# Patient Record
Sex: FEMALE | Race: WHITE | NOT HISPANIC OR LATINO | Employment: OTHER | URBAN - METROPOLITAN AREA
[De-identification: names, ages, dates, MRNs, and addresses within clinical notes are randomized per-mention and may not be internally consistent; named-entity substitution may affect disease eponyms.]

---

## 2017-01-06 ENCOUNTER — COMMUNICATION - HEALTHEAST (OUTPATIENT)
Dept: FAMILY MEDICINE | Facility: CLINIC | Age: 32
End: 2017-01-06

## 2017-01-06 DIAGNOSIS — F98.8 ATTENTION DEFICIT DISORDER: ICD-10-CM

## 2017-02-01 ENCOUNTER — COMMUNICATION - HEALTHEAST (OUTPATIENT)
Dept: FAMILY MEDICINE | Facility: CLINIC | Age: 32
End: 2017-02-01

## 2017-02-01 DIAGNOSIS — F32.9 REACTIVE DEPRESSION: ICD-10-CM

## 2017-02-01 DIAGNOSIS — F98.8 ATTENTION DEFICIT DISORDER: ICD-10-CM

## 2017-02-17 ENCOUNTER — COMMUNICATION - HEALTHEAST (OUTPATIENT)
Dept: FAMILY MEDICINE | Facility: CLINIC | Age: 32
End: 2017-02-17

## 2017-02-17 DIAGNOSIS — F98.8 ATTENTION DEFICIT DISORDER: ICD-10-CM

## 2017-03-02 ENCOUNTER — RECORDS - HEALTHEAST (OUTPATIENT)
Dept: ADMINISTRATIVE | Facility: OTHER | Age: 32
End: 2017-03-02

## 2017-03-04 ENCOUNTER — COMMUNICATION - HEALTHEAST (OUTPATIENT)
Dept: FAMILY MEDICINE | Facility: CLINIC | Age: 32
End: 2017-03-04

## 2017-04-04 ENCOUNTER — COMMUNICATION - HEALTHEAST (OUTPATIENT)
Dept: FAMILY MEDICINE | Facility: CLINIC | Age: 32
End: 2017-04-04

## 2017-05-06 ENCOUNTER — COMMUNICATION - HEALTHEAST (OUTPATIENT)
Dept: FAMILY MEDICINE | Facility: CLINIC | Age: 32
End: 2017-05-06

## 2017-05-06 DIAGNOSIS — F98.8 ATTENTION DEFICIT DISORDER: ICD-10-CM

## 2017-06-23 ENCOUNTER — COMMUNICATION - HEALTHEAST (OUTPATIENT)
Dept: FAMILY MEDICINE | Facility: CLINIC | Age: 32
End: 2017-06-23

## 2017-06-23 DIAGNOSIS — F98.8 ATTENTION DEFICIT DISORDER: ICD-10-CM

## 2017-11-03 ENCOUNTER — COMMUNICATION - HEALTHEAST (OUTPATIENT)
Dept: FAMILY MEDICINE | Facility: CLINIC | Age: 32
End: 2017-11-03

## 2017-11-03 DIAGNOSIS — F98.8 ATTENTION DEFICIT DISORDER: ICD-10-CM

## 2017-11-27 ENCOUNTER — OFFICE VISIT - HEALTHEAST (OUTPATIENT)
Dept: FAMILY MEDICINE | Facility: CLINIC | Age: 32
End: 2017-11-27

## 2017-11-27 DIAGNOSIS — R87.618 ABNORMAL PAPANICOLAOU SMEAR OF CERVIX WITH POSITIVE HUMAN PAPILLOMA VIRUS (HPV) TEST: ICD-10-CM

## 2017-11-27 DIAGNOSIS — F98.8 ATTENTION DEFICIT DISORDER: ICD-10-CM

## 2017-11-27 DIAGNOSIS — Z00.00 ROUTINE GENERAL MEDICAL EXAMINATION AT A HEALTH CARE FACILITY: ICD-10-CM

## 2017-11-27 DIAGNOSIS — F41.9 ANXIETY: ICD-10-CM

## 2017-11-27 LAB
CHOLEST SERPL-MCNC: 199 MG/DL
FASTING STATUS PATIENT QL REPORTED: YES
HDLC SERPL-MCNC: 58 MG/DL
LDLC SERPL CALC-MCNC: 120 MG/DL
TRIGL SERPL-MCNC: 107 MG/DL

## 2017-11-27 ASSESSMENT — MIFFLIN-ST. JEOR: SCORE: 1282.36

## 2017-11-30 LAB
HPV INTERPRETATION - HISTORICAL: NORMAL
HPV INTERPRETER - HISTORICAL: NORMAL

## 2017-12-01 LAB
BKR LAB AP ABNORMAL BLEEDING: NO
BKR LAB AP BIRTH CONTROL/HORMONES: NORMAL
BKR LAB AP CERVICAL APPEARANCE: NORMAL
BKR LAB AP GYN ADEQUACY: NORMAL
BKR LAB AP GYN INTERPRETATION: NORMAL
BKR LAB AP HPV REFLEX: NORMAL
BKR LAB AP LMP: NORMAL
BKR LAB AP PATIENT STATUS: NORMAL
BKR LAB AP PREVIOUS ABNORMAL: NORMAL
BKR LAB AP PREVIOUS NORMAL: 2014
HIGH RISK?: NO
PATH REPORT.COMMENTS IMP SPEC: NORMAL
RESULT FLAG (HE HISTORICAL CONVERSION): NORMAL

## 2018-01-10 ENCOUNTER — COMMUNICATION - HEALTHEAST (OUTPATIENT)
Dept: FAMILY MEDICINE | Facility: CLINIC | Age: 33
End: 2018-01-10

## 2018-02-08 ENCOUNTER — OFFICE VISIT - HEALTHEAST (OUTPATIENT)
Dept: FAMILY MEDICINE | Facility: CLINIC | Age: 33
End: 2018-02-08

## 2018-02-08 DIAGNOSIS — H69.92 EUSTACHIAN TUBE DYSFUNCTION, LEFT: ICD-10-CM

## 2018-02-08 ASSESSMENT — MIFFLIN-ST. JEOR: SCORE: 1272.83

## 2018-03-13 ENCOUNTER — COMMUNICATION - HEALTHEAST (OUTPATIENT)
Dept: FAMILY MEDICINE | Facility: CLINIC | Age: 33
End: 2018-03-13

## 2018-03-14 ENCOUNTER — COMMUNICATION - HEALTHEAST (OUTPATIENT)
Dept: FAMILY MEDICINE | Facility: CLINIC | Age: 33
End: 2018-03-14

## 2018-03-14 DIAGNOSIS — F98.8 ATTENTION DEFICIT DISORDER: ICD-10-CM

## 2018-03-15 ENCOUNTER — COMMUNICATION - HEALTHEAST (OUTPATIENT)
Dept: FAMILY MEDICINE | Facility: CLINIC | Age: 33
End: 2018-03-15

## 2018-03-20 ENCOUNTER — OFFICE VISIT - HEALTHEAST (OUTPATIENT)
Dept: FAMILY MEDICINE | Facility: CLINIC | Age: 33
End: 2018-03-20

## 2018-03-20 ENCOUNTER — AMBULATORY - HEALTHEAST (OUTPATIENT)
Dept: FAMILY MEDICINE | Facility: CLINIC | Age: 33
End: 2018-03-20

## 2018-03-20 DIAGNOSIS — F41.9 ANXIETY: ICD-10-CM

## 2018-03-20 DIAGNOSIS — Z30.017 NEXPLANON INSERTION: ICD-10-CM

## 2018-03-20 LAB
HCG UR QL: NEGATIVE
SP GR UR STRIP: >=1.03 (ref 1–1.03)

## 2019-03-14 ENCOUNTER — COMMUNICATION - HEALTHEAST (OUTPATIENT)
Dept: SCHEDULING | Facility: CLINIC | Age: 34
End: 2019-03-14

## 2019-03-14 ENCOUNTER — OFFICE VISIT - HEALTHEAST (OUTPATIENT)
Dept: FAMILY MEDICINE | Facility: CLINIC | Age: 34
End: 2019-03-14

## 2019-03-14 DIAGNOSIS — J10.1 INFLUENZA A: ICD-10-CM

## 2019-03-14 DIAGNOSIS — J06.9 VIRAL UPPER RESPIRATORY TRACT INFECTION: ICD-10-CM

## 2019-03-14 LAB
FLUAV AG SPEC QL IA: ABNORMAL
FLUBV AG SPEC QL IA: ABNORMAL

## 2019-05-20 ENCOUNTER — OFFICE VISIT - HEALTHEAST (OUTPATIENT)
Dept: FAMILY MEDICINE | Facility: CLINIC | Age: 34
End: 2019-05-20

## 2019-05-20 DIAGNOSIS — N89.8 VAGINAL DISCHARGE: ICD-10-CM

## 2019-05-20 DIAGNOSIS — F41.9 ANXIETY: ICD-10-CM

## 2019-05-20 DIAGNOSIS — F98.8 ATTENTION DEFICIT DISORDER: ICD-10-CM

## 2019-05-20 DIAGNOSIS — F32.1 MODERATE MAJOR DEPRESSION (H): ICD-10-CM

## 2019-05-20 DIAGNOSIS — R87.618 ABNORMAL PAPANICOLAOU SMEAR OF CERVIX WITH POSITIVE HUMAN PAPILLOMA VIRUS (HPV) TEST: ICD-10-CM

## 2019-05-20 DIAGNOSIS — Z00.00 ROUTINE GENERAL MEDICAL EXAMINATION AT A HEALTH CARE FACILITY: ICD-10-CM

## 2019-05-20 DIAGNOSIS — L98.9 SKIN LESION OF FACE: ICD-10-CM

## 2019-05-20 DIAGNOSIS — Z30.46 ENCOUNTER FOR NEXPLANON REMOVAL: ICD-10-CM

## 2019-05-20 LAB
ALBUMIN SERPL-MCNC: 4.2 G/DL (ref 3.5–5)
ALP SERPL-CCNC: 67 U/L (ref 45–120)
ALT SERPL W P-5'-P-CCNC: <9 U/L (ref 0–45)
ANION GAP SERPL CALCULATED.3IONS-SCNC: 12 MMOL/L (ref 5–18)
AST SERPL W P-5'-P-CCNC: 13 U/L (ref 0–40)
BILIRUB SERPL-MCNC: 0.7 MG/DL (ref 0–1)
BUN SERPL-MCNC: 12 MG/DL (ref 8–22)
CALCIUM SERPL-MCNC: 9.6 MG/DL (ref 8.5–10.5)
CHLORIDE BLD-SCNC: 107 MMOL/L (ref 98–107)
CHOLEST SERPL-MCNC: 175 MG/DL
CLUE CELLS: NORMAL
CO2 SERPL-SCNC: 21 MMOL/L (ref 22–31)
CREAT SERPL-MCNC: 0.75 MG/DL (ref 0.6–1.1)
FASTING STATUS PATIENT QL REPORTED: YES
GFR SERPL CREATININE-BSD FRML MDRD: >60 ML/MIN/1.73M2
GLUCOSE BLD-MCNC: 89 MG/DL (ref 70–125)
HDLC SERPL-MCNC: 51 MG/DL
HGB BLD-MCNC: 14.8 G/DL (ref 12–16)
LDLC SERPL CALC-MCNC: 101 MG/DL
POTASSIUM BLD-SCNC: 4.3 MMOL/L (ref 3.5–5)
PROT SERPL-MCNC: 7.1 G/DL (ref 6–8)
SODIUM SERPL-SCNC: 140 MMOL/L (ref 136–145)
TRICHOMONAS, WET PREP: NORMAL
TRIGL SERPL-MCNC: 116 MG/DL
TSH SERPL DL<=0.005 MIU/L-ACNC: 0.81 UIU/ML (ref 0.3–5)
YEAST, WET PREP: NORMAL

## 2019-05-20 ASSESSMENT — MIFFLIN-ST. JEOR: SCORE: 1303.05

## 2019-05-21 LAB
25(OH)D3 SERPL-MCNC: 20.7 NG/ML (ref 30–80)
25(OH)D3 SERPL-MCNC: 20.7 NG/ML (ref 30–80)
HPV SOURCE: NORMAL
HUMAN PAPILLOMA VIRUS 16 DNA: NEGATIVE
HUMAN PAPILLOMA VIRUS 18 DNA: NEGATIVE
HUMAN PAPILLOMA VIRUS FINAL DIAGNOSIS: NORMAL
HUMAN PAPILLOMA VIRUS OTHER HR: NEGATIVE
SPECIMEN DESCRIPTION: NORMAL

## 2019-08-05 ENCOUNTER — RECORDS - HEALTHEAST (OUTPATIENT)
Dept: ADMINISTRATIVE | Facility: OTHER | Age: 34
End: 2019-08-05

## 2019-08-15 ENCOUNTER — COMMUNICATION - HEALTHEAST (OUTPATIENT)
Dept: FAMILY MEDICINE | Facility: CLINIC | Age: 34
End: 2019-08-15

## 2019-08-15 DIAGNOSIS — F98.8 ATTENTION DEFICIT DISORDER: ICD-10-CM

## 2020-02-28 ENCOUNTER — COMMUNICATION - HEALTHEAST (OUTPATIENT)
Dept: FAMILY MEDICINE | Facility: CLINIC | Age: 35
End: 2020-02-28

## 2020-02-28 DIAGNOSIS — F98.8 ATTENTION DEFICIT DISORDER: ICD-10-CM

## 2020-03-02 RX ORDER — DEXTROAMPHETAMINE SACCHARATE, AMPHETAMINE ASPARTATE, DEXTROAMPHETAMINE SULFATE AND AMPHETAMINE SULFATE 2.5; 2.5; 2.5; 2.5 MG/1; MG/1; MG/1; MG/1
TABLET ORAL
Qty: 30 TABLET | Refills: 0 | Status: SHIPPED | OUTPATIENT
Start: 2020-03-02 | End: 2022-11-09

## 2020-06-24 ENCOUNTER — COMMUNICATION - HEALTHEAST (OUTPATIENT)
Dept: FAMILY MEDICINE | Facility: CLINIC | Age: 35
End: 2020-06-24

## 2020-06-24 DIAGNOSIS — F98.8 ATTENTION DEFICIT DISORDER: ICD-10-CM

## 2020-07-15 RX ORDER — DEXTROAMPHETAMINE SACCHARATE, AMPHETAMINE ASPARTATE MONOHYDRATE, DEXTROAMPHETAMINE SULFATE AND AMPHETAMINE SULFATE 2.5; 2.5; 2.5; 2.5 MG/1; MG/1; MG/1; MG/1
CAPSULE, EXTENDED RELEASE ORAL
Qty: 30 CAPSULE | Refills: 0 | Status: SHIPPED | OUTPATIENT
Start: 2020-07-15 | End: 2022-11-09

## 2021-02-02 ENCOUNTER — OFFICE VISIT - HEALTHEAST (OUTPATIENT)
Dept: FAMILY MEDICINE | Facility: CLINIC | Age: 36
End: 2021-02-02

## 2021-02-02 ENCOUNTER — COMMUNICATION - HEALTHEAST (OUTPATIENT)
Dept: FAMILY MEDICINE | Facility: CLINIC | Age: 36
End: 2021-02-02

## 2021-02-02 DIAGNOSIS — F41.9 ANXIETY: ICD-10-CM

## 2021-02-02 DIAGNOSIS — F32.1 MODERATE MAJOR DEPRESSION (H): ICD-10-CM

## 2021-02-02 ASSESSMENT — PATIENT HEALTH QUESTIONNAIRE - PHQ9: SUM OF ALL RESPONSES TO PHQ QUESTIONS 1-9: 13

## 2021-02-12 ENCOUNTER — AMBULATORY - HEALTHEAST (OUTPATIENT)
Dept: FAMILY MEDICINE | Facility: CLINIC | Age: 36
End: 2021-02-12

## 2021-02-12 DIAGNOSIS — L74.510 HYPERHIDROSIS OF AXILLA: ICD-10-CM

## 2021-02-23 ENCOUNTER — OFFICE VISIT - HEALTHEAST (OUTPATIENT)
Dept: FAMILY MEDICINE | Facility: CLINIC | Age: 36
End: 2021-02-23

## 2021-02-23 DIAGNOSIS — F41.9 ANXIETY: ICD-10-CM

## 2021-03-12 ENCOUNTER — COMMUNICATION - HEALTHEAST (OUTPATIENT)
Dept: FAMILY MEDICINE | Facility: CLINIC | Age: 36
End: 2021-03-12

## 2021-03-12 DIAGNOSIS — Z72.51 UNPROTECTED SEXUAL INTERCOURSE: ICD-10-CM

## 2021-03-30 ENCOUNTER — OFFICE VISIT - HEALTHEAST (OUTPATIENT)
Dept: FAMILY MEDICINE | Facility: CLINIC | Age: 36
End: 2021-03-30

## 2021-03-30 DIAGNOSIS — Z11.59 ENCOUNTER FOR HCV SCREENING TEST FOR LOW RISK PATIENT: ICD-10-CM

## 2021-03-30 DIAGNOSIS — Z00.00 ROUTINE GENERAL MEDICAL EXAMINATION AT A HEALTH CARE FACILITY: ICD-10-CM

## 2021-03-30 DIAGNOSIS — Z30.09 ENCOUNTER FOR OTHER GENERAL COUNSELING OR ADVICE ON CONTRACEPTION: ICD-10-CM

## 2021-03-30 LAB
ALBUMIN SERPL-MCNC: 4.3 G/DL (ref 3.5–5)
ALP SERPL-CCNC: 73 U/L (ref 45–120)
ALT SERPL W P-5'-P-CCNC: 11 U/L (ref 0–45)
ANION GAP SERPL CALCULATED.3IONS-SCNC: 11 MMOL/L (ref 5–18)
AST SERPL W P-5'-P-CCNC: 14 U/L (ref 0–40)
BASOPHILS # BLD AUTO: 0.1 THOU/UL (ref 0–0.2)
BASOPHILS NFR BLD AUTO: 1 % (ref 0–2)
BILIRUB SERPL-MCNC: 0.4 MG/DL (ref 0–1)
BUN SERPL-MCNC: 12 MG/DL (ref 8–22)
CALCIUM SERPL-MCNC: 9.5 MG/DL (ref 8.5–10.5)
CHLORIDE BLD-SCNC: 105 MMOL/L (ref 98–107)
CO2 SERPL-SCNC: 25 MMOL/L (ref 22–31)
CREAT SERPL-MCNC: 0.86 MG/DL (ref 0.6–1.1)
EOSINOPHIL # BLD AUTO: 0.1 THOU/UL (ref 0–0.4)
EOSINOPHIL NFR BLD AUTO: 2 % (ref 0–6)
ERYTHROCYTE [DISTWIDTH] IN BLOOD BY AUTOMATED COUNT: 12.9 % (ref 11–14.5)
GFR SERPL CREATININE-BSD FRML MDRD: >60 ML/MIN/1.73M2
GLUCOSE BLD-MCNC: 92 MG/DL (ref 70–125)
HCT VFR BLD AUTO: 44.8 % (ref 35–47)
HGB BLD-MCNC: 14.5 G/DL (ref 12–16)
IMM GRANULOCYTES # BLD: 0 THOU/UL
IMM GRANULOCYTES NFR BLD: 0 %
LYMPHOCYTES # BLD AUTO: 2.6 THOU/UL (ref 0.8–4.4)
LYMPHOCYTES NFR BLD AUTO: 34 % (ref 20–40)
MCH RBC QN AUTO: 29.5 PG (ref 27–34)
MCHC RBC AUTO-ENTMCNC: 32.4 G/DL (ref 32–36)
MCV RBC AUTO: 91 FL (ref 80–100)
MONOCYTES # BLD AUTO: 0.7 THOU/UL (ref 0–0.9)
MONOCYTES NFR BLD AUTO: 9 % (ref 2–10)
NEUTROPHILS # BLD AUTO: 4.3 THOU/UL (ref 2–7.7)
NEUTROPHILS NFR BLD AUTO: 55 % (ref 50–70)
PLATELET # BLD AUTO: 492 THOU/UL (ref 140–440)
PMV BLD AUTO: 9.2 FL (ref 7–10)
POTASSIUM BLD-SCNC: 4.4 MMOL/L (ref 3.5–5)
PROT SERPL-MCNC: 7.4 G/DL (ref 6–8)
RBC # BLD AUTO: 4.91 MILL/UL (ref 3.8–5.4)
SODIUM SERPL-SCNC: 141 MMOL/L (ref 136–145)
TSH SERPL DL<=0.005 MIU/L-ACNC: 1.37 UIU/ML (ref 0.3–5)
WBC: 7.7 THOU/UL (ref 4–11)

## 2021-03-30 ASSESSMENT — MIFFLIN-ST. JEOR: SCORE: 1298.24

## 2021-03-31 LAB
25(OH)D3 SERPL-MCNC: 17.3 NG/ML (ref 30–80)
25(OH)D3 SERPL-MCNC: 17.3 NG/ML (ref 30–80)
HCV AB SERPL QL IA: NEGATIVE

## 2021-04-02 ENCOUNTER — AMBULATORY - HEALTHEAST (OUTPATIENT)
Dept: FAMILY MEDICINE | Facility: CLINIC | Age: 36
End: 2021-04-02

## 2021-04-02 DIAGNOSIS — E55.9 VITAMIN D DEFICIENCY: ICD-10-CM

## 2021-04-02 RX ORDER — ERGOCALCIFEROL 1.25 MG/1
50000 CAPSULE ORAL WEEKLY
Qty: 12 CAPSULE | Refills: 1 | Status: SHIPPED | OUTPATIENT
Start: 2021-04-02 | End: 2021-09-06

## 2021-04-08 ENCOUNTER — OFFICE VISIT - HEALTHEAST (OUTPATIENT)
Dept: FAMILY MEDICINE | Facility: CLINIC | Age: 36
End: 2021-04-08

## 2021-04-08 DIAGNOSIS — N92.6 ABNORMAL MENSES: ICD-10-CM

## 2021-04-08 DIAGNOSIS — Z30.017 NEXPLANON INSERTION: ICD-10-CM

## 2021-04-08 LAB — HCG UR QL: NEGATIVE

## 2021-04-16 ENCOUNTER — COMMUNICATION - HEALTHEAST (OUTPATIENT)
Dept: FAMILY MEDICINE | Facility: CLINIC | Age: 36
End: 2021-04-16

## 2021-04-30 ENCOUNTER — COMMUNICATION - HEALTHEAST (OUTPATIENT)
Dept: FAMILY MEDICINE | Facility: CLINIC | Age: 36
End: 2021-04-30

## 2021-04-30 DIAGNOSIS — F41.9 ANXIETY: ICD-10-CM

## 2021-05-25 ENCOUNTER — RECORDS - HEALTHEAST (OUTPATIENT)
Dept: ADMINISTRATIVE | Facility: CLINIC | Age: 36
End: 2021-05-25

## 2021-05-26 ENCOUNTER — COMMUNICATION - HEALTHEAST (OUTPATIENT)
Dept: FAMILY MEDICINE | Facility: CLINIC | Age: 36
End: 2021-05-26

## 2021-05-26 DIAGNOSIS — F41.9 ANXIETY: ICD-10-CM

## 2021-05-26 RX ORDER — BUPROPION HYDROCHLORIDE 300 MG/1
300 TABLET ORAL DAILY
Qty: 30 TABLET | Refills: 0 | Status: SHIPPED | OUTPATIENT
Start: 2021-05-26 | End: 2021-08-04

## 2021-05-27 ENCOUNTER — RECORDS - HEALTHEAST (OUTPATIENT)
Dept: ADMINISTRATIVE | Facility: CLINIC | Age: 36
End: 2021-05-27

## 2021-05-27 ASSESSMENT — PATIENT HEALTH QUESTIONNAIRE - PHQ9: SUM OF ALL RESPONSES TO PHQ QUESTIONS 1-9: 13

## 2021-05-29 ENCOUNTER — RECORDS - HEALTHEAST (OUTPATIENT)
Dept: ADMINISTRATIVE | Facility: CLINIC | Age: 36
End: 2021-05-29

## 2021-05-29 NOTE — PROGRESS NOTES
Assessment:      Healthy female exam.    1. Routine general medical examination at a health care facility  Comprehensive Metabolic Panel    Lipid Profile    Thyroid Stimulating Hormone (TSH)    Hemoglobin    Vitamin D, Total (25-Hydroxy)   2. ADHD, Predominantly Inattentive Type  dextroamphetamine-amphetamine (ADDERALL XR) 10 MG 24 hr capsule    dextroamphetamine-amphetamine (ADDERALL) 10 mg Tab tablet   3. Anxiety  buPROPion (WELLBUTRIN XL) 150 MG 24 hr tablet   4. Skin lesion of face  Ambulatory referral to Dermatology   5. Encounter for Nexplanon removal  lidocaine 1%-EPINEPHrine 1:100,000 1 %-1:100,000 injection 2.5 mL (XYLOCAINE W/EPI)   6. Abnormal Papanicolaou smear of cervix with positive human papilloma virus (HPV) test  Gynecologic Cytology (PAP Smear)    HPV High Risk DNA Cervical   7. Vaginal discharge  Wet Prep, Vaginal   8. Moderate major depression (H)            Plan:      This is a 34 yo female here for physical exam:  1.  Has h/o abnormal pap smear - will do cervical cancer screening today - pap/HPV sent today.  2.  Encounter for Nexplanon removal - patient's Nexplanon has  - she wishes removal of device today; does not want further contraception at this time  3.  ADHD - discussed at length - taking stimulant therapy - tolerating well  4.  Anxiety/Depression  - PHQ-9 Total Score: 13 (2019  2:00 PM)  still having some symptoms - taking Bupropion - tolerating okay  5.  Skin lesion - on face - non-healing and crusted - will recommend referral to dermatology  6. Vaginal discharge - check wet prep      Subjective:      Alia Wilson is a 33 y.o. female who presents for an annual exam. The patient reports that there is not domestic violence in her life.     Anxious, feel like stimulant therapy is helpful in general  Busy, running after kids      Healthy Habits:   Regular Exercise: No  Sunscreen Use: No  Healthy Diet: trying  Dental Visits Regularly: Yes  Seat Belt: Yes  Sexually active:  Yes  Self Breast Exam Monthly:No      Immunization History   Administered Date(s) Administered     DT (pediatric) 2005     HPV Quadrivalent 2008, 2008, 2008     Hep B, historic 1996     Influenza, Seasonal, Inj PF IIV3 10/21/2009     Influenza, inj, historic,unspecified 10/01/2014     Influenza,seasonal, Inj IIV3 2006     MMR 1996     Tdap 2008, 2015     Varicella Zoster Immune Globulin 10/07/2014     Immunization status: reviewed.    No exam data present    Gynecologic History  No LMP recorded. Patient has had an implant.  Contraception: Nexplanon - removed today  Last Pap: 17 Results were: normal, but has h/o abnormals  Last mammogram: NA. Results were: NA      OB History    Para Term  AB Living   3 3 2 1 0 3   SAB TAB Ectopic Multiple Live Births   0 0 0 0 1      # Outcome Date GA Lbr Jer/2nd Weight Sex Delivery Anes PTL Lv   3  04/16/15 32w2d  3 lb 10 oz (1.644 kg) M CS-LTranv EPI Y EVELIO      Birth Comments: Premature birth due to complete placenta previa - bleeding      Complications: Placenta Previa   2 Term            1 Term                Current Outpatient Medications   Medication Sig Dispense Refill     buPROPion (WELLBUTRIN XL) 150 MG 24 hr tablet Take 1 tablet (150 mg total) by mouth daily. 30 tablet 5     dextroamphetamine-amphetamine (ADDERALL XR) 10 MG 24 hr capsule Take 1 capsule (10 mg total) by mouth daily. 30 capsule 0     dextroamphetamine-amphetamine (ADDERALL) 10 mg Tab tablet Take 1 tablet by mouth daily. 30 tablet 0     Current Facility-Administered Medications   Medication Dose Route Frequency Provider Last Rate Last Dose     etonogestrel 68 mg implant 1 each (NEXPLANON)  1 each Subdermal Once April Alejandre PA-C         Past Medical History:   Diagnosis Date     Placenta previa 2015     Pregnancy 2015     Past Surgical History:   Procedure Laterality Date     CERVICAL BIOPSY  W/ LOOP ELECTRODE  EXCISION       GA CONIZATION CERVIX,KNIFE/LASER      Description: Cervical Conization;  Recorded: 07/30/2008;     GA CONIZATION CERVIX,KNIFE/LASER      Description: Cervical Conization;  Proc Date: 01/01/2008;     Percocet [oxycodone-acetaminophen] and Dilaudid [hydromorphone]  Family History   Problem Relation Age of Onset     COPD Mother         early     Heart disease Mother         atypical chest pain     Hypertension Mother      Ovarian cancer Maternal Grandmother      Breast cancer Paternal Grandmother      Cervical cancer Paternal Grandmother      Social History     Socioeconomic History     Marital status: Single     Spouse name: Not on file     Number of children: Not on file     Years of education: Not on file     Highest education level: Not on file   Occupational History     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   Social Needs     Financial resource strain: Not on file     Food insecurity:     Worry: Not on file     Inability: Not on file     Transportation needs:     Medical: Not on file     Non-medical: Not on file   Tobacco Use     Smoking status: Former Smoker     Smokeless tobacco: Never Used     Tobacco comment: no smoke exposure    Substance and Sexual Activity     Alcohol use: No     Drug use: No     Sexual activity: Yes     Partners: Male   Lifestyle     Physical activity:     Days per week: Not on file     Minutes per session: Not on file     Stress: Not on file   Relationships     Social connections:     Talks on phone: Not on file     Gets together: Not on file     Attends Restoration service: Not on file     Active member of club or organization: Not on file     Attends meetings of clubs or organizations: Not on file     Relationship status: Not on file     Intimate partner violence:     Fear of current or ex partner: Not on file     Emotionally abused: Not on file     Physically abused: Not on file     Forced sexual activity: Not on file   Other Topics Concern     Not on file  "  Social History Narrative    Lives with , 3 kids    Works for [employer]       Review of Systems  Review of Systems      Pertinent positives as noted in HPI; otherwise 12 point ROS negative.      Objective:         Vitals:    05/20/19 1031   BP: 92/70   Pulse: 88   Resp: 20   Temp: 98.2  F (36.8  C)   TempSrc: Oral   Weight: 146 lb 9 oz (66.5 kg)   Height: 5' 2\" (1.575 m)     Body mass index is 26.81 kg/m .    Physical  Physical Exam    EXAM:  BP 92/70 (Patient Site: Right Arm, Patient Position: Sitting, Cuff Size: Adult Regular)   Pulse 88   Temp 98.2  F (36.8  C) (Oral)   Resp 20   Ht 5' 2\" (1.575 m)   Wt 146 lb 9 oz (66.5 kg)   Breastfeeding? No   BMI 26.81 kg/m     Gen:  NAD, appears well, well-hydrated  HEENT:  TMs nl, oropharynx benign, nasal mucosa nl, conjunctiva clear  Neck:  Supple, no adenopathy, no thyromegaly, no carotid bruits, no JVD  Lungs:  Clear to auscultation bilaterally  Breast exam:  No breast lumps, no skin changes, no nipple discharge, no axillary adenopathy  Cor:  RRR no murmur  Abd:  Soft, nontender, BS+, no masses, no guarding or rebound, no HSM  PELVIC EXAM:External genitalia: normal  Vaginal mucosa normal  Vaginal discharge: white  Speculum exam shows a normal appearing cervix .   Bimanual exam: Cervix closed, firm, non tender  to motion.  Uterus  firm, regular, mobile, non tender to palpation. No adnexal masses or tenderness.   Extr:  Neg.  Neuro:  No asymmetry, Nl motor tone/strength, nl sensation, reflexes =, gait nl, nl coordination, CN intact,   Skin:  Warm/dry, small sl hyperpigmented lesion left face with scaling        "

## 2021-05-30 ENCOUNTER — RECORDS - HEALTHEAST (OUTPATIENT)
Dept: ADMINISTRATIVE | Facility: CLINIC | Age: 36
End: 2021-05-30

## 2021-05-31 ENCOUNTER — RECORDS - HEALTHEAST (OUTPATIENT)
Dept: ADMINISTRATIVE | Facility: CLINIC | Age: 36
End: 2021-05-31

## 2021-05-31 VITALS — BODY MASS INDEX: 25.75 KG/M2 | WEIGHT: 139.9 LBS | HEIGHT: 62 IN

## 2021-05-31 VITALS — BODY MASS INDEX: 26.13 KG/M2 | WEIGHT: 142 LBS | HEIGHT: 62 IN

## 2021-05-31 NOTE — TELEPHONE ENCOUNTER
Controlled Substance Refill Request  Medication:   Requested Prescriptions     Pending Prescriptions Disp Refills     dextroamphetamine-amphetamine (ADDERALL) 10 mg Tab tablet [Pharmacy Med Name: Amphetamine-Dextroamphetamine Oral Tablet 10 MG] 30 tablet 0     Sig: TAKE ONE TABLET BY MOUTH ONE TIME DAILY     Date Last Fill: 5/20/19  [facility]   Submit electronically to pharmacy  Controlled Substance Agreement on File:   Encounter-Level CSA Scan Date:    There are no encounter-level csa scan date.       Last office visit: Last office visit pertaining to requested medication was 5/20/19.

## 2021-06-01 VITALS — BODY MASS INDEX: 25.24 KG/M2 | WEIGHT: 138 LBS

## 2021-06-02 VITALS — BODY MASS INDEX: 26.44 KG/M2 | WEIGHT: 144.56 LBS

## 2021-06-03 VITALS — WEIGHT: 146.56 LBS | HEIGHT: 62 IN | BODY MASS INDEX: 26.97 KG/M2

## 2021-06-05 VITALS
SYSTOLIC BLOOD PRESSURE: 104 MMHG | DIASTOLIC BLOOD PRESSURE: 71 MMHG | BODY MASS INDEX: 25.16 KG/M2 | RESPIRATION RATE: 18 BRPM | WEIGHT: 142 LBS | TEMPERATURE: 97.9 F | HEART RATE: 82 BPM | HEIGHT: 63 IN

## 2021-06-05 VITALS
WEIGHT: 140 LBS | BODY MASS INDEX: 24.8 KG/M2 | DIASTOLIC BLOOD PRESSURE: 67 MMHG | SYSTOLIC BLOOD PRESSURE: 98 MMHG | HEART RATE: 69 BPM

## 2021-06-06 NOTE — TELEPHONE ENCOUNTER
Controlled Substance Refill Request  Medication Name:   Requested Prescriptions     Pending Prescriptions Disp Refills     dextroamphetamine-amphetamine (ADDERALL) 10 mg Tab tablet [Pharmacy Med Name: Amphetamine-Dextroamphetamine Oral Tablet 10 MG] 30 tablet 0     Sig: TAKE ONE TABLET BY MOUTH ONE TIME DAILY     Date Last Fill: 8/16/19  [facility]  Submit electronically to pharmacy  Controlled Substance Agreement on file:   Encounter-Level CSA Scan Date:    There are no encounter-level csa scan date.        Last office visit:  5/20/19    Chapis Isaac RN  Triage Nurse Advisor

## 2021-06-14 NOTE — PROGRESS NOTES
Assessment:      Healthy female exam.    1. Routine general medical examination at a health care facility  Lipid Profile    Hemoglobin    Basic Metabolic Panel    Vitamin D, Total (25-Hydroxy)    Thyroid Stimulating Hormone (TSH)    Wet Prep, Vaginal   2. Abnormal Papanicolaou smear of cervix with positive human papilloma virus (HPV) test  Gynecologic Cytology (PAP Smear)    HPV Cascade (PCR)   3. Attention deficit disorder     4. Anxiety  buPROPion (WELLBUTRIN XL) 150 MG 24 hr tablet          Plan:      32 yo female here for physical exam.  No specific new concerns.  1.  Has h/o previous abnl pap - check pap smear today.    2.  H/o ADD - continue stimulant therapy as previous - doesn't use it always daily, this is appropriate  3.  Anxiety - try Bupropion - 150 mg daily - recheck 1-2 months.     Subjective:      Alia Wilson is a 31 y.o. female who presents for an annual exam.  The patient reports that there is not domestic violence in her life.     Here for physical   No specific concerns except increased anxiety  Getting in way of effective work    Healthy Habits:   Regular Exercise: No  Sunscreen Use: No  Healthy Diet: tryin  Dental Visits Regularly: Yes  Seat Belt: Yes  Sexually active: Yes  Self Breast Exam Monthly:No        Immunization History   Administered Date(s) Administered     DT (pediatric) 2005     HPV Quadrivalent 2008, 2008, 2008     Hep B, historic 1996     Influenza, inj, historic,unspecified 10/01/2014     MMR 1996     Tdap 2008, 2015     Varicella Zoster Immune Globulin 10/07/2014     Immunization status: declines flu shot - usually gets them at work.    No exam data present    Gynecologic History  No LMP recorded.  Contraception: condoms - discussed - desires Nexplanon  Last Pap: . Results were: normal but history of abnormal/cone biopsy  Last mammogram: na. Results were: na      OB History    Para Term  AB Living   3 3 2 1 0  3   SAB TAB Ectopic Multiple Live Births   0 0 0 0 1      # Outcome Date GA Lbr Jer/2nd Weight Sex Delivery Anes PTL Lv   3  04/16/15 32w2d  3 lb 10 oz (1.644 kg) M CS-LTranv EPI Y EVELIO      Complications: Placenta Previa      Birth Comments: Premature birth due to complete placenta previa - bleeding   2 Term            1 Term                   Current Outpatient Prescriptions   Medication Sig Dispense Refill     ADDERALL XR 10 mg 24 hr capsule TAKE ONE CAPSULE EVERY DAY 30 capsule 0     dextroamphetamine-amphetamine (ADDERALL) 10 mg Tab tablet TAKE ONE TABLET BY MOUTH ONE TIME DAILY  30 tablet 0     VENTOLIN HFA 90 mcg/actuation inhaler   0     buPROPion (WELLBUTRIN XL) 150 MG 24 hr tablet Take 1 tablet (150 mg total) by mouth daily. 30 tablet 5     No current facility-administered medications for this visit.      Past Medical History:   Diagnosis Date     Placenta previa 2015     Pregnancy 2015     Past Surgical History:   Procedure Laterality Date     CERVICAL BIOPSY  W/ LOOP ELECTRODE EXCISION       IN CONIZATION CERVIX,KNIFE/LASER      Description: Cervical Conization;  Recorded: 2008;     IN CONIZATION CERVIX,KNIFE/LASER      Description: Cervical Conization;  Proc Date: 2008;     Percocet [oxycodone-acetaminophen] and Dilaudid [hydromorphone]  Family History   Problem Relation Age of Onset     COPD Mother      early     Heart disease Mother      atypical chest pain     Hypertension Mother      Ovarian cancer Maternal Grandmother      Breast cancer Paternal Grandmother      Cervical cancer Paternal Grandmother      Social History     Social History     Marital status: Single     Spouse name: N/A     Number of children: N/A     Years of education: N/A     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        Objective:         Vitals:    11/27/17 1103   BP: 100/74   Pulse: 80   Weight: 142 lb (64.4 kg)   Height: 5' 2\" (1.575 m)     Body mass index is 25.97 kg/(m^2).    Physical  Physical Exam    EXAM:  /74  Pulse 80  Ht 5' 2\" (1.575 m)  Wt 142 lb (64.4 kg)  BMI 25.97 kg/m2   Gen:  NAD, appears well, well-hydrated  HEENT:  TMs nl, oropharynx benign, nasal mucosa nl, conjunctiva clear  Neck:  Supple, no adenopathy, no thyromegaly, no carotid bruits, no JVD  Lungs:  Clear to auscultation bilaterally  Breast exam:  No breast lumps, no skin changes, no nipple discharge, no axillary adenopathy  Cor:  RRR no murmur  Abd:  Soft, nontender, BS+, no masses, no guarding or rebound, no HSM  PELVIC EXAM:External genitalia: normal  Vaginal mucosa normal  Vaginal discharge: thick clear sticky mucus  Speculum exam shows a normal appearing cervix - large ectropion.   Bimanual exam: Cervix closed, firm, non tender  to motion.  Uterus  firm, regular, mobile, non tender to palpation. No adnexal masses or tenderness.   Extr:  Neg.  Neuro:  No asymmetry  Skin:  Warm/dry        "
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
## 2021-06-15 NOTE — TELEPHONE ENCOUNTER
Reason for Call:  Other call back      Detailed comments:   Pt calling asking if [facility] can giver her an rx for plan b  Phone Number Patient can be reached at: Home number on file [phone] (home)    Best Time: asap    Can we leave a detailed message on this number?: Yes    Call taken on 3/12/2021 at 3:15 PM by Aster Khan

## 2021-06-15 NOTE — PROGRESS NOTES
"Alia Wilson is a 35 y.o. female who is being evaluated via a billable video visit.      How would you like to obtain your AVS? MyChart.  If dropped from the video visit, the video invitation should be resent by: Text to cell phone: [phone]  Will anyone else be joining your video visit? No      Video Start Time: 0915     ASSESSMENT/PLAN:  1. Anxiety  buPROPion (WELLBUTRIN XL) 300 MG 24 hr tablet       This is a 34 yo female seen for follow up of anxiety symptoms.  Has significant anxiety (with depression overlay).  Worse with isolation from pandemic.  Currently taking Bupropion 150 mg daily (started early FEbruary), and feels like it helped \"a little\".  She opted not to pursue counseling/therapy.  Discussed outcomes we'd like to see :  Will renew her Bupropion, but increase dose to 300 mg daily.  She is agreeable.  Needs to be seen in 4-6 weeks.   Return in about 4 weeks (around 3/23/2021) for Recheck.      Medications Discontinued During This Encounter   Medication Reason     buPROPion (WELLBUTRIN XL) 150 MG 24 hr tablet Reorder     There are no Patient Instructions on file for this visit.    Chief Complaint:  Chief Complaint   Patient presents with     Other       HPI:   Alia Wilson is a 35 y.o. female c/o  Started medication;   Didn't start any therapy -     2 kids still home  1 in school     Sleep is \"okay\"  Hasn't been able to sleep as well - since starting medication - still getting enough sleep     Takes the Adderall tablet - only as needed -     PMH:   Patient Active Problem List    Diagnosis Date Noted     Moderate major depression (H) 05/27/2019     Nexplanon insertion (3/20/18) 03/20/2018     Anxiety 11/27/2017     Reactive depression 09/19/2016     Palpitations 12/15/2015     Whiplash injuries, initial encounter 09/29/2015     Unspecified constipation 11/22/2014     Cough      Pediculosis Capitis      Acne      Abnormal Papanicolaou smear of cervix with positive human papilloma virus (HPV) " test      Allergies      ADHD, Predominantly Inattentive Type      Past Medical History:   Diagnosis Date     Placenta previa 2/6/2015     Pregnancy 4/16/2015     Past Surgical History:   Procedure Laterality Date     CERVICAL BIOPSY  W/ LOOP ELECTRODE EXCISION       [place] CONIZATION CERVIX,KNIFE/LASER      Description: Cervical Conization;  Recorded: 07/30/2008;     [place] CONIZATION CERVIX,KNIFE/LASER      Description: Cervical Conization;  Proc Date: 01/01/2008;     Social History     Socioeconomic History     Marital status: Single     Spouse name: Not on file     Number of children: Not on file     Years of education: Not on file     Highest education level: Not on file   Occupational History     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   Social Needs     Financial resource strain: Not on file     Food insecurity     Worry: Not on file     Inability: Not on file     Transportation needs     Medical: Not on file     Non-medical: Not on file   Tobacco Use     Smoking status: Former Smoker     Smokeless tobacco: Never Used     Tobacco comment: no smoke exposure    Substance and Sexual Activity     Alcohol use: No     Drug use: No     Sexual activity: Yes     Partners: Male   Lifestyle     Physical activity     Days per week: Not on file     Minutes per session: Not on file     Stress: Not on file   Relationships     Social connections     Talks on phone: Not on file     Gets together: Not on file     Attends Pentecostalism service: Not on file     Active member of club or organization: Not on file     Attends meetings of clubs or organizations: Not on file     Relationship status: Not on file     Intimate partner violence     Fear of current or ex partner: Not on file     Emotionally abused: Not on file     Physically abused: Not on file     Forced sexual activity: Not on file   Other Topics Concern     Not on file   Social History Narrative    Lives with , 3 kids    Works for [employer]     Family  History   Problem Relation Age of Onset     COPD Mother         early     Heart disease Mother         atypical chest pain     Hypertension Mother      Ovarian cancer Maternal Grandmother      Breast cancer Paternal Grandmother      Cervical cancer Paternal Grandmother        Meds:    Current Outpatient Medications:      aluminum chloride (DRYSOL) 20 % external solution, Apply once daily at bedtime, wash off in morning.  Once excessive sweating has stopped, may decrease to once or twice weekly, or as needed., Disp: 60 mL, Rfl: 3     buPROPion (WELLBUTRIN XL) 300 MG 24 hr tablet, Take 1 tablet (300 mg total) by mouth daily., Disp: 30 tablet, Rfl: 1     dextroamphetamine-amphetamine (ADDERALL) 10 mg Tab tablet, TAKE ONE TABLET BY MOUTH ONE TIME DAILY, Disp: 30 tablet, Rfl: 0     dextroamphetamine-amphetamine (ADDERALL XR) 10 MG 24 hr capsule, TAKE ONE CAPSULE EVERY DAY, Disp: 30 capsule, Rfl: 0    Allergies:  Allergies   Allergen Reactions     Percocet [Oxycodone-Acetaminophen] Nausea And Vomiting     Dilaudid [Hydromorphone]        ROS:  Pertinent positives as noted in HPI; otherwise 12 point ROS negative.      Physical Exam:  EXAM:  LMP 02/02/2021 (Approximate)   Breastfeeding No    Gen:  NAD, appears well, poor eye contact, vague historian          Results:          Video-Visit Details    Type of service:  Video Visit    Video End Time (time video stopped): 0925  Originating Location (pt. Location): Home    Distant Location (provider location):  [facility]     Platform used for Video Visit: [place]

## 2021-06-15 NOTE — PROGRESS NOTES
"ASSESSMENT/PLAN:  1. Anxiety  buPROPion (WELLBUTRIN XL) 300 MG 24 hr tablet       This is a 34 yo female seen for follow up of anxiety symptoms.  Has significant anxiety (with depression overlay).  Worse with isolation from pandemic.  Currently taking Bupropion 150 mg daily (started early FEbruary), and feels like it helped \"a little\".  She opted not to pursue counseling/therapy.  Discussed outcomes we'd like to see :  Will renew her Bupropion, but increase dose to 300 mg daily.  She is agreeable.  Needs to be seen in 4-6 weeks.   Return in about 4 weeks (around 3/23/2021) for Recheck.      Medications Discontinued During This Encounter   Medication Reason     buPROPion (WELLBUTRIN XL) 150 MG 24 hr tablet Reorder     There are no Patient Instructions on file for this visit.    Chief Complaint:  Chief Complaint   Patient presents with     Other       HPI:   Alia Wilson is a 35 y.o. female c/o  Started medication;   Didn't start any therapy -     2 kids still home  1 in school     Sleep is \"okay\"  Hasn't been able to sleep as well - since starting medication - still getting enough sleep     Takes the Adderall tablet - only as needed -     PMH:   Patient Active Problem List    Diagnosis Date Noted     Moderate major depression (H) 05/27/2019     Nexplanon insertion (3/20/18) 03/20/2018     Anxiety 11/27/2017     Reactive depression 09/19/2016     Palpitations 12/15/2015     Whiplash injuries, initial encounter 09/29/2015     Unspecified constipation 11/22/2014     Cough      Pediculosis Capitis      Acne      Abnormal Papanicolaou smear of cervix with positive human papilloma virus (HPV) test      Allergies      ADHD, Predominantly Inattentive Type      Past Medical History:   Diagnosis Date     Placenta previa 2/6/2015     Pregnancy 4/16/2015     Past Surgical History:   Procedure Laterality Date     CERVICAL BIOPSY  W/ LOOP ELECTRODE EXCISION       [place] CONIZATION CERVIX,KNIFE/LASER      Description: Cervical " Conization;  Recorded: 07/30/2008;     [place] CONIZATION CERVIX,KNIFE/LASER      Description: Cervical Conization;  Proc Date: 01/01/2008;     Social History     Socioeconomic History     Marital status: Single     Spouse name: Not on file     Number of children: Not on file     Years of education: Not on file     Highest education level: Not on file   Occupational History     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   Social Needs     Financial resource strain: Not on file     Food insecurity     Worry: Not on file     Inability: Not on file     Transportation needs     Medical: Not on file     Non-medical: Not on file   Tobacco Use     Smoking status: Former Smoker     Smokeless tobacco: Never Used     Tobacco comment: no smoke exposure    Substance and Sexual Activity     Alcohol use: No     Drug use: No     Sexual activity: Yes     Partners: Male   Lifestyle     Physical activity     Days per week: Not on file     Minutes per session: Not on file     Stress: Not on file   Relationships     Social connections     Talks on phone: Not on file     Gets together: Not on file     Attends Confucianist service: Not on file     Active member of club or organization: Not on file     Attends meetings of clubs or organizations: Not on file     Relationship status: Not on file     Intimate partner violence     Fear of current or ex partner: Not on file     Emotionally abused: Not on file     Physically abused: Not on file     Forced sexual activity: Not on file   Other Topics Concern     Not on file   Social History Narrative    Lives with , 3 kids    Works for [employer]     Family History   Problem Relation Age of Onset     COPD Mother         early     Heart disease Mother         atypical chest pain     Hypertension Mother      Ovarian cancer Maternal Grandmother      Breast cancer Paternal Grandmother      Cervical cancer Paternal Grandmother        Meds:    Current Outpatient Medications:       aluminum chloride (DRYSOL) 20 % external solution, Apply once daily at bedtime, wash off in morning.  Once excessive sweating has stopped, may decrease to once or twice weekly, or as needed., Disp: 60 mL, Rfl: 3     buPROPion (WELLBUTRIN XL) 300 MG 24 hr tablet, Take 1 tablet (300 mg total) by mouth daily., Disp: 30 tablet, Rfl: 1     dextroamphetamine-amphetamine (ADDERALL) 10 mg Tab tablet, TAKE ONE TABLET BY MOUTH ONE TIME DAILY, Disp: 30 tablet, Rfl: 0     dextroamphetamine-amphetamine (ADDERALL XR) 10 MG 24 hr capsule, TAKE ONE CAPSULE EVERY DAY, Disp: 30 capsule, Rfl: 0    Allergies:  Allergies   Allergen Reactions     Percocet [Oxycodone-Acetaminophen] Nausea And Vomiting     Dilaudid [Hydromorphone]        ROS:  Pertinent positives as noted in HPI; otherwise 12 point ROS negative.      Physical Exam:  EXAM:  LMP 02/02/2021 (Approximate)   Breastfeeding No    Gen:  NAD, appears well, poor eye contact, vague historian          Results:        Video Visit:  Andrea  [id number]    Originating Location:  Home  My Location:  [facility]

## 2021-06-15 NOTE — PROGRESS NOTES
Assessment/Plan:     1. Eustachian tube dysfunction, left  Discussed likely self-limiting eustachian tube dysfunction secondary to resolving upper respiratory infection.  Discussed possible options including manual manipulation of ear canals.  Patient is interested in using Sudafed.  She declined a prescription for Flonase.  I see no indication for antibiotics at this time.  Call return to care if symptoms worsen or do not improve.          Subjective:      Alia Wilson is a 32 y.o. female comes in today concerned with left ear pain that started yesterday.  She is at the week before she had mild upper respiratory symptoms cough runny nose cold symptoms those have significantly improved still mild intermittent cough but she states much better she took some over-the-counter NyQuil DayQuil medication.  She describes the ear as pressure with decrease hearing it feels clogged and she gets dizzy sometimes.  Again she states this just started yesterday she states today she felt a little bit of pain in the left ear right ear feels normal.  There is never had problems with her ear before.  Is my first meeting patient as she typically goes to another [facility] office briefly reviewed past note from primary care provider and visit history.    Current Outpatient Prescriptions   Medication Sig Dispense Refill     ADDERALL XR 10 mg 24 hr capsule TAKE ONE CAPSULE EVERY DAY 30 capsule 0     buPROPion (WELLBUTRIN XL) 150 MG 24 hr tablet Take 1 tablet (150 mg total) by mouth daily. 30 tablet 5     dextroamphetamine-amphetamine (ADDERALL) 10 mg Tab tablet TAKE ONE TABLET BY MOUTH ONE TIME DAILY  30 tablet 0     VENTOLIN HFA 90 mcg/actuation inhaler   0     No current facility-administered medications for this visit.      Allergies   Allergen Reactions     Percocet [Oxycodone-Acetaminophen] Nausea And Vomiting     Dilaudid [Hydromorphone]      Past Medical History, Family History, and Social History reviewed.  Past Medical  "History:   Diagnosis Date     Placenta previa 2/6/2015     Pregnancy 4/16/2015     Past Surgical History:   Procedure Laterality Date     CERVICAL BIOPSY  W/ LOOP ELECTRODE EXCISION       [place] CONIZATION CERVIX,KNIFE/LASER      Description: Cervical Conization;  Recorded: 07/30/2008;     [place] CONIZATION CERVIX,KNIFE/LASER      Description: Cervical Conization;  Proc Date: 01/01/2008;     Percocet [oxycodone-acetaminophen] and Dilaudid [hydromorphone]  Family History   Problem Relation Age of Onset     COPD Mother      early     Heart disease Mother      atypical chest pain     Hypertension Mother      Ovarian cancer Maternal Grandmother      Breast cancer Paternal Grandmother      Cervical cancer Paternal Grandmother      Social History     Social History     Marital status: Single     Spouse name: N/A     Number of children: N/A     Years of education: N/A     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    Objective:     Vitals:    02/08/18 1059   BP: 110/66   Patient Site: Left Arm   Patient Position: Sitting   Cuff Size: Adult Regular   Pulse: 66   SpO2: 98%   Weight: 139 lb 14.4 oz (63.5 kg)   Height: 5' 2\" (1.575 m)    Body mass index is 25.59 kg/(m^2).  Wt Readings from Last 3 Encounters:   02/08/18 139 lb 14.4 oz (63.5 kg)   11/27/17 142 lb (64.4 kg)   10/11/16 135 lb (61.2 kg)       General Appearance:    Alert, cooperative, no distress, appears stated age    Head:    Normocephalic, without obvious abnormality, atraumatic   Eyes:    PERRL, " EOM's intact, no conjunctivitis    Ears:   Moderate clear fluid bulge behind tympanic membrane on the left, no signs of infection, no erythema dullness.  Otherwise normal external ear canals   Nose:   Mucosa normal, no drainage     or sinus tenderness   Throat:   Oropharynx is clear   Neck:   Supple, symmetrical, no adenopathy, no thyromegally, no carotid bruit        Lungs:     Clear to auscultation bilaterally, respirations unlabored   Chest Wall:    No tenderness or deformity    Heart:    Regular rate and rhythm, S1 and S2 normal, no murmur, rub    or gallop                       Pulses:   2+ and symmetric all extremities   Neuro:   cranial nerves grossly intact   Psych:   grossly normal mood and affect without acute anxiety or psychosis    Skin:   No rashes or lesions             This note has been dictated using voice recognition software. Any grammatical or context distortions are unintentional and inherent to the software.

## 2021-06-16 PROBLEM — F41.9 ANXIETY: Status: ACTIVE | Noted: 2017-11-27

## 2021-06-16 PROBLEM — F32.1 MODERATE MAJOR DEPRESSION (H): Status: ACTIVE | Noted: 2019-05-27

## 2021-06-16 NOTE — PROGRESS NOTES
Assessment:      Healthy female exam.    1. Routine general medical examination at a health care facility  Thyroid Stimulating Hormone (TSH)    HM1(CBC and Differential)    Comprehensive Metabolic Panel    Vitamin D, Total (25-Hydroxy)   2. Encounter for HCV screening test for low risk patient  Hepatitis C Antibody (Anti-HCV)   3. Encounter for other general counseling or advice on contraception            Plan:      This is a 34 yo female here for physical exam:    1.  Desires Nexplanon insertion for contraception - discussed - have scheduled her with my partner who does this procedure (in the near future)  2.  Health Maintenance - due for labs  Discussed recommendation for hepatitis C screening - ordered      Subjective:      Alia Wilson is a 35 y.o. female who presents for an annual exam.  The patient reports that there is not domestic violence in her life.     Super busy at home  Had Nexplanon placed 3/20/18 - out ?  Wants another Nexplanon -   Got membership at gym      Healthy Habits:   Regular Exercise: No  Sunscreen Use: No  Healthy Diet: trying  Dental Visits Regularly: No  Seat Belt: Yes  Sexually active: Yes  Self Breast Exam Monthly:irregular        Immunization History   Administered Date(s) Administered     DT (pediatric) 2005     HPV Quadrivalent 2008, 2008, 2008     Hep B, historic 1996     Influenza, Seasonal, Inj PF IIV3 10/21/2009     Influenza, inj, historic,unspecified 10/01/2014     Influenza,seasonal, Inj IIV3 2006     MMR 1996     Tdap 2008, 2015     Varicella Zoster Immune Globulin 10/07/2014     Immunization status: reviewed.    No exam data present    Gynecologic History  No LMP recorded.  Contraception: none  Last Pap:2019. Results were: normal  Last mammogram: na. Results were: na      OB History    Para Term  AB Living   3 3 2 1 0 3   SAB TAB Ectopic Multiple Live Births   0 0 0 0 1      # Outcome Date GA Lbr  Jer/2nd Weight Sex Delivery Anes PTL Lv   3  04/16/15 32w2d  3 lb 10 oz (1.644 kg) M CS-LTranv EPI Y EVELIO      Birth Comments: Premature birth due to complete placenta previa - bleeding      Complications: Placenta Previa   2 Term            1 Term                Current Outpatient Medications   Medication Sig Dispense Refill     aluminum chloride (DRYSOL) 20 % external solution Apply once daily at bedtime, wash off in morning.  Once excessive sweating has stopped, may decrease to once or twice weekly, or as needed. 60 mL 3     buPROPion (WELLBUTRIN XL) 300 MG 24 hr tablet Take 1 tablet (300 mg total) by mouth daily. 30 tablet 1     dextroamphetamine-amphetamine (ADDERALL XR) 10 MG 24 hr capsule TAKE ONE CAPSULE EVERY DAY 30 capsule 0     dextroamphetamine-amphetamine (ADDERALL) 10 mg Tab tablet TAKE ONE TABLET BY MOUTH ONE TIME DAILY 30 tablet 0     ergocalciferol (ERGOCALCIFEROL) 1,250 mcg (50,000 unit) capsule Take 1 capsule (50,000 Units total) by mouth once a week. 12 capsule 1     No current facility-administered medications for this visit.      Past Medical History:   Diagnosis Date     Placenta previa 2015     Pregnancy 2015     Past Surgical History:   Procedure Laterality Date     CERVICAL BIOPSY  W/ LOOP ELECTRODE EXCISION       [place] CONIZATION CERVIX,KNIFE/LASER      Description: Cervical Conization;  Recorded: 2008;     [place] CONIZATION CERVIX,KNIFE/LASER      Description: Cervical Conization;  Proc Date: 2008;     Percocet [oxycodone-acetaminophen] and Dilaudid [hydromorphone]  Family History   Problem Relation Age of Onset     COPD Mother         early     Heart disease Mother         atypical chest pain     Hypertension Mother      Ovarian cancer Maternal Grandmother      Breast cancer Paternal Grandmother      Cervical cancer Paternal Grandmother      Social History     Socioeconomic History     Marital status: Single     Spouse name: Not on file     Number of children: Not on  "file     Years of education: Not on file     Highest education level: Not on file   Occupational History     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   Social Needs     Financial resource strain: Not on file     Food insecurity     Worry: Not on file     Inability: Not on file     Transportation needs     Medical: Not on file     Non-medical: Not on file   Tobacco Use     Smoking status: Former Smoker     Smokeless tobacco: Never Used     Tobacco comment: no smoke exposure    Substance and Sexual Activity     Alcohol use: No     Drug use: No     Sexual activity: Yes     Partners: Male   Lifestyle     Physical activity     Days per week: Not on file     Minutes per session: Not on file     Stress: Not on file   Relationships     Social connections     Talks on phone: Not on file     Gets together: Not on file     Attends Caodaism service: Not on file     Active member of club or organization: Not on file     Attends meetings of clubs or organizations: Not on file     Relationship status: Not on file     Intimate partner violence     Fear of current or ex partner: Not on file     Emotionally abused: Not on file     Physically abused: Not on file     Forced sexual activity: Not on file   Other Topics Concern     Not on file   Social History Narrative    Lives with , 3 kids    Works for [employer]       Review of Systems  Review of Systems     Pertinent positives as noted in HPI; otherwise 12 point ROS negative.        Objective:         Vitals:    03/30/21 1656   BP: 104/71   Pulse: 82   Resp: 18   Temp: 97.9  F (36.6  C)   TempSrc: Temporal   Weight: 142 lb (64.4 kg)   Height: 5' 3\" (1.6 m)     Body mass index is 25.15 kg/m .    Physical  Physical Exam    EXAM:  /71 (Patient Site: Left Arm, Patient Position: Sitting, Cuff Size: Adult Regular)   Pulse 82   Temp 97.9  F (36.6  C) (Temporal)   Resp 18   Ht 5' 3\" (1.6 m)   Wt 142 lb (64.4 kg)   BMI 25.15 kg/m     Gen:  NAD, appears " well, well-hydrated  HEENT:  TMs nl, oropharynx benign, nasal mucosa nl, conjunctiva clear  Neck:  Supple, no adenopathy, no thyromegaly, no carotid bruits, no JVD  Lungs:  Clear to auscultation bilaterally  Breast exam:  No breast lumps, no skin changes, no nipple discharge, no axillary adenopathy  Cor:  RRR no murmur  Abd:  Soft, nontender, BS+, no masses, no guarding or rebound, no HSM  Extr:  Neg., no deformity  Neuro:  No asymmetry, Nl motor tone/strength, nl sensation, reflexes =, gait nl, nl coordination, CN intact,   Skin:  Warm/dry

## 2021-06-16 NOTE — PROGRESS NOTES
Subjective:      Alia Wilson is a 32 y.o. female who presents for evaluation of Nexplanon for birth control.  She has had IUDs in the past and has not liked them.  She discussed Nexplanon with her PCP.  Risks, benefits, possible side effects of Nexplanon were discussed with patient.  All of her questions were answered.    Patient Active Problem List   Diagnosis     Cough     Pediculosis Capitis     Acne     Abnormal Papanicolaou smear of cervix with positive human papilloma virus (HPV) test     Allergies     ADHD, Predominantly Inattentive Type     Unspecified constipation     Whiplash injuries, initial encounter     Palpitations     Reactive depression     Anxiety       Current Outpatient Prescriptions:      buPROPion (WELLBUTRIN XL) 150 MG 24 hr tablet, Take 1 tablet (150 mg total) by mouth daily., Disp: 30 tablet, Rfl: 5     dextroamphetamine-amphetamine (ADDERALL XR) 10 MG 24 hr capsule, Take 1 capsule (10 mg total) by mouth daily., Disp: 30 capsule, Rfl: 0     dextroamphetamine-amphetamine (ADDERALL) 10 mg Tab tablet, Take 1 tablet by mouth daily., Disp: 30 tablet, Rfl: 0     VENTOLIN HFA 90 mcg/actuation inhaler, , Disp: , Rfl: 0     Objective:     Allergies:  Percocet [oxycodone-acetaminophen] and Dilaudid [hydromorphone]    Vitals:  Vitals:    03/20/18 0803   BP: 90/60   Pulse: 92   Resp: 18     Body mass index is 25.24 kg/(m^2).    Vital signs reviewed.  General: Patient is alert and oriented x 3, in no apparent distress    Procedure:  Left upper inner arm was adequately anesthetized with 2.5 cc of lidocaine with Epi.  Then, using sterile technique, Nexplanon was inserted and adelina was deployed without difficulty.  Nexplanon adelina was palpable subcutaneously by myself.  Insertion site was covered with a band-aid and area was wrapped with a pressure bandage.  Patient was neurovascularly intact after exam.  Proper wound aftercare was dicussed with patient.    Results for orders placed or performed in visit  on 03/20/18   Pregnancy (Beta-hCG, Qual), Urine   Result Value Ref Range    Pregnancy Test, Urine Negative Negative    Specific Gravity, UA >=1.030 1.001 - 1.030     Assessment and Plan:   1. Nexplanon insertion.  Insertion Date: 3/20/2018  3 year expiration Date: 3/20/2021  Consent form was reviewed with patient, signed, and will be scanned in to her chart.  She knows to use backup birth control for the next week.    This dictation uses voice recognition software, which may contain typographical errors.

## 2021-06-16 NOTE — PROGRESS NOTES
Subjective:    Alia Wilson is a 35 y.o. female who presents for contraception management.  She would like Nexplanon for contraception.  She has used Nexplanon in the past, has had no problems.  She took Plan B about 3 weeks ago.  Has not had any unprotected sex since then.    Patient Active Problem List   Diagnosis     Cough     Pediculosis Capitis     Acne     Abnormal Papanicolaou smear of cervix with positive human papilloma virus (HPV) test     Allergies     ADHD, Predominantly Inattentive Type     Unspecified constipation     Whiplash injuries, initial encounter     Palpitations     Reactive depression     Anxiety     Nexplanon insertion (3/20/18)     Moderate major depression (H)       Current Outpatient Medications:      aluminum chloride (DRYSOL) 20 % external solution, Apply once daily at bedtime, wash off in morning.  Once excessive sweating has stopped, may decrease to once or twice weekly, or as needed., Disp: 60 mL, Rfl: 3     buPROPion (WELLBUTRIN XL) 300 MG 24 hr tablet, Take 1 tablet (300 mg total) by mouth daily., Disp: 30 tablet, Rfl: 1     dextroamphetamine-amphetamine (ADDERALL XR) 10 MG 24 hr capsule, TAKE ONE CAPSULE EVERY DAY, Disp: 30 capsule, Rfl: 0     dextroamphetamine-amphetamine (ADDERALL) 10 mg Tab tablet, TAKE ONE TABLET BY MOUTH ONE TIME DAILY, Disp: 30 tablet, Rfl: 0     ergocalciferol (ERGOCALCIFEROL) 1,250 mcg (50,000 unit) capsule, Take 1 capsule (50,000 Units total) by mouth once a week., Disp: 12 capsule, Rfl: 1  No current facility-administered medications for this visit.       Tobacco Use      Smoking status: Former Smoker      Smokeless tobacco: Never Used      Tobacco comment: no smoke exposure       Objective:   Allergies:  Percocet [oxycodone-acetaminophen] and Dilaudid [hydromorphone]    Vitals:    04/08/21 1121   BP: 98/67   Pulse: 69     Body mass index is 24.8 kg/m .    General: Alert and oriented x 3, in no apparent distress    Procedure:  Left upper inner arm  was adequately anesthetized with 2.5 cc of lidocaine with Epi.  Then, using sterile technique, Nexplanon was inserted and adelina was deployed without difficulty.  Nexplanon adelina was palpable subcutaneously by myself.  Insertion site was covered with a band-aid and area was wrapped with a pressure bandage.  Patient was neurovascularly intact after exam.  Appropriate wound aftercare was dicussed with patient.    Results for orders placed or performed in visit on 04/08/21   Pregnancy (Beta-hCG, Qual), Urine   Result Value Ref Range    Pregnancy Test, Urine Negative Negative          Assessment and Plan:     1. Nexplanon insertion  Insertion Date: 04/08/21  3 Year Expiration Date: 04/08/24  Consent form was reviewed with patient, signed, and will be scanned in to her chart.  She knows to use back-up birth control for the next 1 week.   - Pregnancy (Beta-hCG, Qual), Urine  - lidocaine 1%-EPINEPHrine 1:100,000 1 %-1:100,000 injection 2.5 mL (XYLOCAINE W/EPI)        This dictation uses voice recognition software, which may contain typographical errors.

## 2021-06-17 NOTE — TELEPHONE ENCOUNTER
Pt went to pharmacy to   She just had visit with   04/8  Please ok refill Thank you     Pt will run out on 05/02

## 2021-06-19 NOTE — LETTER
Letter by Neda Castano MD at      Author: Neda Castano MD Service: -- Author Type: --    Filed:  Encounter Date: 5/20/2019 Status: (Other)         [facility] FAMILY MEDICINE/OB  05/20/19    Patient: Alia Wilson  YOB: 1985  Medical Record Number: [id number]  CSN: [id number]                                                                              Non-opioid Controlled Substance Agreement    I understand that my care provider has prescribed a controlled substance to help manage my condition(s). I am taking this medicine to help me function or work. I know this is strong medicine, and that it can cause serious side effects. Controlled substances can be sedating, addicting and may cause a dependency on the drug. They can affect my ability to drive or think, and cause depression. They need to be taken exactly as prescribed. Combining controlled substances with certain medicines or chemicals (such as cocaine, sedatives and tranquilizers, sleeping pills, meth) can be dangerous or even fatal. Also, if I stop controlled substances suddenly, I may have severe withdrawal symptoms.  If not helpful, I may be asked to stop them.    The risks, benefits, and side effects of these medicine(s) were explained to me. I agree that:    1. I will take part in other treatments as advised by my care team. This may be psychiatry or counseling, physical therapy, behavioral therapy, group treatment or a referral to a pain clinic. I will reduce or stop my medicine when my care team tells me to do so.  2. I will take my medicines as prescribed. I will not change the dose or schedule unless my care team tells me to. There will be no refills if I run out early.  I may be contactedwithout warning and asked to complete a urine drug test or pill count at any time.   3. I will keep all my appointments, and understand this is part of the monitoring of controlled substances. My care team may  require an office visit for EVERY controlled substance refill. If I miss appointments or dont follow instructions, my care team may stop my medicine.  4. I will not ask other providers to prescribe controlled substances, and I will not accept controlled substances from other people. If I need another prescribed controlled substance for a new reason, I will tell my care team within 1 business day.  5. I will use one pharmacy to fill all of my controlled substance prescriptions, and it is up to me to make sure that I do not run out of my medicines on weekends or holidays. If my care team is willing to refill my controlled substance prescription without a visit, I must request refills only during office hours, refills may take up to 3 days to process, and it may take up to 5 to 7 days for my medicine to be mailed and ready at my pharmacy. Prescriptions will not be mailed anywhere except my pharmacy.    6. I am responsible for my prescriptions. If the medicine/prescription is lost or stolen, it will not be replaced. I also agree not to share controlled substance medicines with anyone.          [facility] FAMILY MEDICINE/OB  05/20/19  Patient:  Alia Wilson  YOB: 1985  Medical Record Number: [id number]  CSN: [id number]    7. I agree to not use ANY illegal or recreational drugs. This includes marijuana, cocaine, bath salts or other drugs. I agree not to use alcohol unless my care team says I may. I agree to give urine samples whenever asked. If I dont give a urine sample, the care team may stop my medicine.    8. If I enroll in the [place] Medical Marijuana program, I will tell my care team. I will also sign an agreement to share my medical records with my care team.    9. I will bring in my list of medicines (or my medicine bottles) each time I come to the clinic.   10. I will tell my care team right away if I become pregnant or have a new medical problem treated outside of my regular  clinic.  11. I understand that this medicine can affect my thinking and judgment. It may be unsafe for me to drive, use machinery and do dangerous tasks. I will not do any of these things until I know how the medicine affects me. If my dose changes, I will wait to see how it affects me. I will contact my care team if I have concerns about medicine side effects.    I understand that if I do not follow any of the conditions above, my prescriptions or treatment may be stopped.      I agree that my provider, clinic care team, and pharmacy may work with any city, state or federal law enforcement agency that investigates the misuse, sale, or other diversion of my controlled medicine. I will allow my provider to discuss my care with or share a copy of this agreement with any other treating provider, pharmacy or emergency room where I receive care. I agree to give up (waive) any right of privacy or confidentiality with respect to these consents.   I have read this agreement and have asked questions about anything I did not understand.    ___________________________________________________________________________  Patient signature - Date/Time  -Alia Wilson                                      ___________________________________________________________________________  Witness signature                                                                    ___________________________________________________________________________  Provider signature- Neda Castano MD

## 2021-06-24 ENCOUNTER — APPOINTMENT (OUTPATIENT)
Dept: URBAN - METROPOLITAN AREA CLINIC 260 | Age: 36
Setting detail: DERMATOLOGY
End: 2021-06-24

## 2021-06-24 VITALS — HEIGHT: 62 IN | WEIGHT: 135 LBS

## 2021-06-24 DIAGNOSIS — L82.1 OTHER SEBORRHEIC KERATOSIS: ICD-10-CM

## 2021-06-24 DIAGNOSIS — D49.2 NEOPLASM OF UNSPECIFIED BEHAVIOR OF BONE, SOFT TISSUE, AND SKIN: ICD-10-CM

## 2021-06-24 PROCEDURE — OTHER EDUCATIONAL RESOURCES PROVIDED: OTHER

## 2021-06-24 PROCEDURE — 11102 TANGNTL BX SKIN SINGLE LES: CPT

## 2021-06-24 PROCEDURE — OTHER BIOPSY BY SHAVE METHOD: OTHER

## 2021-06-24 PROCEDURE — OTHER LIQUID NITROGEN (COSMETIC): OTHER

## 2021-06-24 PROCEDURE — 99202 OFFICE O/P NEW SF 15 MIN: CPT | Mod: 25

## 2021-06-24 PROCEDURE — OTHER PATHOLOGY BILLING: OTHER

## 2021-06-24 PROCEDURE — 88305 TISSUE EXAM BY PATHOLOGIST: CPT

## 2021-06-24 PROCEDURE — OTHER COUNSELING: OTHER

## 2021-06-24 ASSESSMENT — LOCATION DETAILED DESCRIPTION DERM
LOCATION DETAILED: LEFT CENTRAL MALAR CHEEK
LOCATION DETAILED: RIGHT INGUINAL CREASE
LOCATION DETAILED: LEFT SUPERIOR CENTRAL MALAR CHEEK

## 2021-06-24 ASSESSMENT — LOCATION SIMPLE DESCRIPTION DERM
LOCATION SIMPLE: LEFT CHEEK
LOCATION SIMPLE: LEFT CHEEK
LOCATION SIMPLE: GROIN

## 2021-06-24 ASSESSMENT — LOCATION ZONE DERM
LOCATION ZONE: TRUNK
LOCATION ZONE: FACE
LOCATION ZONE: FACE

## 2021-06-24 NOTE — PROCEDURE: PATHOLOGY BILLING
Immunohistochemistry (24482 and 24092) billing is not performed here. Please use the Immunohistochemistry Stain Billing plan to accomplish this. Immunohistochemistry (25509 and 89566) billing is not performed here. Please use the Immunohistochemistry Stain Billing plan to accomplish this.

## 2021-06-24 NOTE — PROCEDURE: BIOPSY BY SHAVE METHOD
Hide Anticipated Plan (Based On Presumed Biopsy Results)?: No
Anesthesia Volume In Cc (Will Not Render If 0): 0.5
Depth Of Biopsy: dermis
X Size Of Lesion In Cm: 0
Billing Type: Client Bill
Post-Care Instructions: I reviewed with the patient in detail post-care instructions. Patient is to keep the biopsy site dry overnight, and then apply bacitracin twice daily until healed. Patient may apply hydrogen peroxide soaks to remove any crusting.
Dressing: bandage
Curettage Text: The wound bed was treated with curettage after the biopsy was performed.
Information: Selecting Yes will display possible errors in your note based on the variables you have selected. This validation is only offered as a suggestion for you. PLEASE NOTE THAT THE VALIDATION TEXT WILL BE REMOVED WHEN YOU FINALIZE YOUR NOTE. IF YOU WANT TO FAX A PRELIMINARY NOTE YOU WILL NEED TO TOGGLE THIS TO 'NO' IF YOU DO NOT WANT IT IN YOUR FAXED NOTE.
Electrodesiccation And Curettage Text: The wound bed was treated with electrodesiccation and curettage after the biopsy was performed.
Biopsy Method: Dermablade
Detail Level: Detailed
Anesthesia Type: 1% lidocaine with epinephrine
Wound Care: Petrolatum
Electrodesiccation Text: The wound bed was treated with electrodesiccation after the biopsy was performed.
Type Of Destruction Used: Curettage
Was A Bandage Applied: Yes
Cryotherapy Text: The wound bed was treated with cryotherapy after the biopsy was performed.
Consent: Written consent was obtained and risks were reviewed including but not limited to scarring, infection, bleeding, scabbing, incomplete removal, nerve damage and allergy to anesthesia.
Biopsy Type: H and E
Silver Nitrate Text: The wound bed was treated with silver nitrate after the biopsy was performed.
Hemostasis: Drysol
Notification Instructions: Patient will be notified of biopsy results. However, patient instructed to call the office if not contacted within 2 weeks.

## 2021-06-24 NOTE — PROCEDURE: LIQUID NITROGEN (COSMETIC)
Post-Care Instructions: I reviewed with the patient in detail post-care instructions. Patient is to wear sunprotection, and avoid picking at any of the treated lesions. Pt may apply Vaseline to crusted or scabbing areas.
Detail Level: Detailed
Render Post-Care Instructions In Note?: no
Consent: The patient's consent was obtained including but not limited to risks of crusting, scabbing, blistering, scarring, darker or lighter pigmentary change, recurrence, incomplete removal and infection. The patient understands that the procedure is cosmetic in nature and is not covered by insurance.
Billing Information: Bill by Static Price
Price (Use Numbers Only, No Special Characters Or $): 100

## 2021-06-24 NOTE — PROGRESS NOTES
Assessment:     Alia was seen today for fever and cough.    1. Influenza A     2. Viral upper respiratory tract infection  albuterol (PROAIR HFA;PROVENTIL HFA;VENTOLIN HFA) 90 mcg/actuation inhaler    Influenza A/B Rapid Test    albuterol (PROVENTIL) 2.5 mg /3 mL (0.083 %) nebulizer solution           Plan:     1. Viral upper respiratory tract infection  Nasal swab sent for influenza AMB screening  - albuterol (PROAIR HFA;PROVENTIL HFA;VENTOLIN HFA) 90 mcg/actuation inhaler; Inhale 2 puffs every 6 (six) hours as needed for wheezing.  Dispense: 1 each; Refill: 0  - Influenza A/B Rapid Test  - albuterol (PROVENTIL) 2.5 mg /3 mL (0.083 %) nebulizer solution; Take 3 mL (2.5 mg total) by nebulization every 4 (four) hours as needed for wheezing.  Dispense: 25 vial; Refill: 2    Patient's influenza A is positive.  Results are given to her.  She has a 4-year-old son at home.  If he becomes ill I suggest she call their clinic as he would be a candidate for Tamiflu.  Because patient is 4 days out from her onset of illness I do not think Tamiflu is indicated in her case.  This is discussed with patient.    Subjective:      Willi is a 33 y.o. female presenting to my clinic for evaluation of upper respiratory symptoms.  Patient says her daughter has been sick for about a week and a half with a URI and sore throat symptoms.  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  She says she started feeling chest heaviness that evening and again increasing on Sunday.  Then she developed cold sweats and sinus drainage and myalgias.  She has been using Tylenol and ibuprofen alternately for comfort.  Myalgias and chest heaviness are the most difficult symptoms for her.    She has had allergic reactions and wheezing in the past to the point of using an inhaler in the past.  Her children have a nebulizer and she said her breathing feels so tight she would like to have neb medicine  if she could.  She is normal patient of Dr Byrd who delivered her 13-year-old daughter..     We decided to do a rapid influenza a and B screen today  Current Outpatient Medications on File Prior to Visit   Medication Sig Dispense Refill     buPROPion (WELLBUTRIN XL) 150 MG 24 hr tablet Take 1 tablet (150 mg total) by mouth daily. 30 tablet 5     dextroamphetamine-amphetamine (ADDERALL XR) 10 MG 24 hr capsule Take 1 capsule (10 mg total) by mouth daily. 30 capsule 0     dextroamphetamine-amphetamine (ADDERALL) 10 mg Tab tablet Take 1 tablet by mouth daily. 30 tablet 0     FLUoxetine (PROZAC) 20 MG tablet Take 1 tablet (20 mg total) by mouth daily. 30 tablet 3     VENTOLIN HFA 90 mcg/actuation inhaler   0     Current Facility-Administered Medications on File Prior to Visit   Medication Dose Route Frequency Provider Last Rate Last Dose     etonogestrel 68 mg implant 1 each (NEXPLANON)  1 each Subdermal Once April Alejandre PA-C         lidocaine 1%-EPINEPHrine 1:100,000 1 %-1:100,000 injection 2.5 mL (XYLOCAINE W/EPI)  2.5 mL Other Once April Alejandre PA-C         Allergies   Allergen Reactions     Percocet [Oxycodone-Acetaminophen] Nausea And Vomiting     Dilaudid [Hydromorphone]      Past Medical History:   Diagnosis Date     Placenta previa 2/6/2015     Pregnancy 4/16/2015     Past Surgical History:   Procedure Laterality Date     CERVICAL BIOPSY  W/ LOOP ELECTRODE EXCISION       MA CONIZATION CERVIX,KNIFE/LASER      Description: Cervical Conization;  Recorded: 07/30/2008;     MA CONIZATION CERVIX,KNIFE/LASER      Description: Cervical Conization;  Proc Date: 01/01/2008;     Social History     Socioeconomic History     Marital status: Single     Spouse name: Not on file     Number of children: Not on file     Years of education: Not on file     Highest education level: Not on file   Occupational History     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   Social Needs     Financial resource strain:  Not on file     Food insecurity:     Worry: Not on file     Inability: Not on file     Transportation needs:     Medical: Not on file     Non-medical: Not on file   Tobacco Use     Smoking status: Former Smoker     Smokeless tobacco: Never Used     Tobacco comment: no smoke exposure    Substance and Sexual Activity     Alcohol use: No     Drug use: No     Sexual activity: Yes     Partners: Male   Lifestyle     Physical activity:     Days per week: Not on file     Minutes per session: Not on file     Stress: Not on file   Relationships     Social connections:     Talks on phone: Not on file     Gets together: Not on file     Attends [employer] service: Not on file     Active member of club or organization: Not on file     Attends meetings of clubs or organizations: Not on file     Relationship status: Not on file     Intimate partner violence:     Fear of current or ex partner: Not on file     Emotionally abused: Not on file     Physically abused: Not on file     Forced sexual activity: Not on file   Other Topics Concern     Not on file   Social History Narrative    Lives with , 3 kids    Works for [employer]     Family History   Problem Relation Age of Onset     COPD Mother         early     Heart disease Mother         atypical chest pain     Hypertension Mother      Ovarian cancer Maternal Grandmother      Breast cancer Paternal Grandmother      Cervical cancer Paternal Grandmother        ROS:  I have performed a 10 point ROS.  All pertinent positives and negatives are found in the HPI.  All others are negative.      Objective:     Physical Exam:  /65 (Patient Site: Right Arm, Patient Position: Sitting, Cuff Size: Adult Regular)   Pulse 91   Temp 98.7  F (37.1  C) (Oral)   Wt 144 lb 9 oz (65.6 kg)   LMP  (LMP Unknown)   SpO2 94%   Breastfeeding? No   BMI 26.44 kg/m    General Appearance: Alert, cooperative, no distress, appears stated age    Head: Normocephalic, without obvious  abnormality, atraumatic  Eyes: PERRL, conjunctiva/corneas clear, EOM's intact  Ears: Normal TM's and external ear canals, both ears  Nose: Nares normal, septum midline,mucosa normal, n purulent drainage down back of throat   Throat: Lips, mucosa, and tongue normal; teeth and gums normal  Neck: Supple, symmetrical, trachea midline, mild anterior cervical lymphadenopathy;  thyroid: not enlarged, symmetric, no tenderness/mass/nodules; no carotid bruit or JVD  Lungs: Clear to auscultation bilaterally, respirations unlabored/  Tight breath  sounds in the chest though no wheezing  Heart: Regular rate and rhythm, S1 and S2 normal, no murmur, rub, or gallop,       Abdomen: Soft, non-tender, bowel sounds active all four quadrants,  no masses, no organomegaly  Back: Symmetric, no curvature, ROM normal, no CVA tenderness    Skin: Skin color, texture, turgor normal, no rashes or lesions  Lymph nodes: Cervical, supraclavicular, and axillary nodes normal  Neurologic: Normal   Mental status:    Influenza a and B swab sent-positive for influenza A

## 2021-06-24 NOTE — TELEPHONE ENCOUNTER
Started getting sick on Monday, fever started 4 days ago.     Thinks she breathed in sanding dust on Sunday while doing some wood work.    Patient has been experiencing cold/chills, 103 fever last night temporal, Body aches, headache, cough, cold sweats    Highest fever 103.7 temporal    Tylenol and ibuprofen to treat, but it is not helping. Drinking lots of water    Denies pregnancy    No travel    No difficulty breathing  No stiff neck    Triaged to a disposition of See a physician within 24 hours. Patient is agreeable and Call transferred to scheduling for appointment.    Reason for Disposition    Fever present > 3 days (72 hours)    Protocols used: FEVER-A-    Laury Barrett RN Triage Nurse Advisor [facility]

## 2021-06-25 NOTE — TELEPHONE ENCOUNTER
Reason for Call:  Medication or medication refill:    Do you use a [facility]?  Name of the pharmacy and phone number for the current request: [facility] in [place]    Name of the medication requested: bupropion    Other request: patient needs a refill but she aslo stated last month she spoke to dr nunez about this rx and she was told if she needed an icrease that would be just fine. I told her dr nunez is out til June 1 but soneone would address this.    Can we leave a detailed message on this number? Yes    Phone number patient can be reached at: Home number on file [phone] (home)    Best Time: asap    Call taken on 5/26/2021 at 11:21 AM by Aster Khan

## 2021-06-27 ENCOUNTER — HEALTH MAINTENANCE LETTER (OUTPATIENT)
Age: 36
End: 2021-06-27

## 2021-07-03 NOTE — ADDENDUM NOTE
Addendum Note by Lorene Sanford CMA at 3/20/2018  9:04 AM     Author: Lorene Sanford CMA Service: -- Author Type: Medical Assistant    Filed: 3/20/2018  9:04 AM Encounter Date: 3/20/2018 Status: Signed    : Lorene Sanford CMA (Medical Assistant)    Addended by: LORENE SANFORD on: 3/20/2018 09:04 AM        Modules accepted: Orders

## 2021-07-03 NOTE — ADDENDUM NOTE
Addendum Note by Mario Alejandre PA-C at 3/20/2018  9:03 AM     Author: Mario Alejandre PA-C Service: -- Author Type: Physician Assistant    Filed: 3/20/2018  9:03 AM Encounter Date: 3/20/2018 Status: Signed    : Mario Alejandre PA-C (Physician Assistant)    Addended by: MARIO ALEJANDRE on: 3/20/2018 09:03 AM        Modules accepted: Orders

## 2021-07-05 ENCOUNTER — COMMUNICATION - HEALTHEAST (OUTPATIENT)
Dept: FAMILY MEDICINE | Facility: CLINIC | Age: 36
End: 2021-07-05

## 2021-07-05 DIAGNOSIS — F41.9 ANXIETY: ICD-10-CM

## 2021-07-05 RX ORDER — BUPROPION HYDROCHLORIDE 300 MG/1
TABLET ORAL
Qty: 90 TABLET | Refills: 3 | Status: SHIPPED | OUTPATIENT
Start: 2021-07-05 | End: 2021-09-17

## 2021-07-05 NOTE — TELEPHONE ENCOUNTER
Telephone Encounter by Kathryn Quiñones RN at 7/5/2021 12:24 PM     Author: Kathryn Quiñones RN Service: -- Author Type: Registered Nurse    Filed: 7/5/2021 12:25 PM Encounter Date: 7/5/2021 Status: Signed    : Kathryn Quiñones RN (Registered Nurse)       Refill Approved    Rx renewed per Medication Renewal Policy. Medication was last renewed on 05/26/2021.  Last office visit was 04/08/2021 with PCP office.    Kathryn Quiñones, [facility] Triage/Med Refill 7/5/2021     Requested Prescriptions   Pending Prescriptions Disp Refills   ? buPROPion (WELLBUTRIN XL) 300 MG 24 hr tablet [Pharmacy Med Name: buPROPion HCl ER (XL) Oral Tablet Extended Release 24 Hour 300 MG] 30 tablet 0     Sig: TAKE ONE TABLET BY MOUTH ONE TIME DAILY       Tricyclics/Misc Antidepressant/Antianxiety Meds Refill Protocol Passed - 7/5/2021 11:17 AM        Passed - PCP or prescribing provider visit in last year     Last office visit with prescriber/PCP: Visit date not found OR same dept: 4/8/2021 April Alejandre PA-C OR same specialty: 4/8/2021 April Alejandre PA-C  Last physical: Visit date not found Last MTM visit: Visit date not found   Next visit within 3 mo: Visit date not found  Next physical within 3 mo: Visit date not found  Prescriber OR PCP: Anshu Fraknlin MD  Last diagnosis associated with med order: 1. Anxiety  - buPROPion (WELLBUTRIN XL) 300 MG 24 hr tablet [Pharmacy Med Name: buPROPion HCl ER (XL) Oral Tablet Extended Release 24 Hour 300 MG]; TAKE ONE TABLET BY MOUTH ONE TIME DAILY   Dispense: 30 tablet; Refill: 0    If protocol passes may refill for 12 months if within 3 months of last provider visit (or a total of 15 months).

## 2021-08-03 DIAGNOSIS — F41.9 ANXIETY: ICD-10-CM

## 2021-08-03 NOTE — TELEPHONE ENCOUNTER
Reason for Call:  Medication or medication refill:    Do you use a [facility]?  Name of the pharmacy and phone number for the current request:  [facility] in [place]    Name of the medication requested: buPROPion (WELLBUTRIN XL) 300 MG 24 hr tablet    Other request:  Patient called to request a refill of this medication. She has enough to last to tomorrow. Please send refills to pharmacy on file.    Can we leave a detailed message on this number? YES    Phone number patient can be reached at: Home number on file [phone] (home)    Best Time: any    Call taken on 8/3/2021 at 10:19 AM by Flora Luevano

## 2021-08-04 RX ORDER — BUPROPION HYDROCHLORIDE 300 MG/1
300 TABLET ORAL DAILY
Qty: 30 TABLET | Refills: 0 | Status: SHIPPED | OUTPATIENT
Start: 2021-08-04 | End: 2021-09-03

## 2021-08-05 ENCOUNTER — APPOINTMENT (OUTPATIENT)
Dept: URBAN - METROPOLITAN AREA CLINIC 260 | Age: 36
Setting detail: DERMATOLOGY
End: 2021-08-05

## 2021-08-05 DIAGNOSIS — L82.1 OTHER SEBORRHEIC KERATOSIS: ICD-10-CM

## 2021-08-05 PROCEDURE — OTHER LIQUID NITROGEN (COSMETIC): OTHER

## 2021-08-05 PROCEDURE — OTHER ADDITIONAL NOTES: OTHER

## 2021-08-05 ASSESSMENT — LOCATION DETAILED DESCRIPTION DERM: LOCATION DETAILED: LEFT CENTRAL MALAR CHEEK

## 2021-08-05 ASSESSMENT — LOCATION ZONE DERM: LOCATION ZONE: FACE

## 2021-08-05 ASSESSMENT — LOCATION SIMPLE DESCRIPTION DERM: LOCATION SIMPLE: LEFT CHEEK

## 2021-08-05 NOTE — HPI: SKIN LESION (SEBORRHEIC KERATOSES)
Is This A New Presentation, Or A Follow-Up?: Follow Up Seborrheic Keratoses
Additional History: Had this cosmetically treated. There’s just one little area still remaining

## 2021-08-05 NOTE — PROCEDURE: ADDITIONAL NOTES
Additional Notes: No charge follow up, cosmetic touch up. Retouched up with ln2
Render Risk Assessment In Note?: no
Detail Level: Detailed

## 2021-08-05 NOTE — PROCEDURE: LIQUID NITROGEN (COSMETIC)
Price (Use Numbers Only, No Special Characters Or $): 0
Post-Care Instructions: I reviewed with the patient in detail post-care instructions. Patient is to wear sunprotection, and avoid picking at any of the treated lesions. Pt may apply Vaseline to crusted or scabbing areas.
Detail Level: Detailed
Render Post-Care Instructions In Note?: no
Consent: The patient's consent was obtained including but not limited to risks of crusting, scabbing, blistering, scarring, darker or lighter pigmentary change, recurrence, incomplete removal and infection. The patient understands that the procedure is cosmetic in nature and is not covered by insurance.
Billing Information: Bill by Static Price

## 2021-09-03 ENCOUNTER — TELEPHONE (OUTPATIENT)
Dept: FAMILY MEDICINE | Facility: CLINIC | Age: 36
End: 2021-09-03

## 2021-09-03 DIAGNOSIS — F41.9 ANXIETY: ICD-10-CM

## 2021-09-03 RX ORDER — BUPROPION HYDROCHLORIDE 300 MG/1
300 TABLET ORAL DAILY
Qty: 90 TABLET | Refills: 1 | Status: SHIPPED | OUTPATIENT
Start: 2021-09-03 | End: 2021-10-05

## 2021-09-06 ENCOUNTER — MYC REFILL (OUTPATIENT)
Dept: FAMILY MEDICINE | Facility: CLINIC | Age: 36
End: 2021-09-06

## 2021-09-06 DIAGNOSIS — L74.510 HYPERHIDROSIS OF AXILLA: ICD-10-CM

## 2021-09-06 DIAGNOSIS — E55.9 VITAMIN D DEFICIENCY: ICD-10-CM

## 2021-09-06 NOTE — TELEPHONE ENCOUNTER
Routing refill request to provider for review/approval because:  Drug not on the [facility] refill protocol   Early refill request    Last Written Prescription Date:  4/2/21  Last Fill Quantity: 12,  # refills: 1   Last office visit provider:  4/8/21     Requested Prescriptions   Pending Prescriptions Disp Refills     ergocalciferol (ERGOCALCIFEROL) 1.25 MG (98232 UT) capsule 12 capsule 1     Sig: Take 1 capsule (50,000 Units) by mouth once a week       There is no refill protocol information for this order          Nolan Lombardi RN 09/06/21 12:24 PM

## 2021-09-06 NOTE — TELEPHONE ENCOUNTER
"Routing refill request to provider for review/approval because:  Please clarify SIG    Last Written Prescription Date:  2/12/21  Last Fill Quantity: 60 ml,  # refills: 3   Last office visit provider:  4/8/21     Requested Prescriptions   Pending Prescriptions Disp Refills     aluminum chloride (DRYSOL) 20 % external solution 60 mL 3       Miscellaneous Dermatologic Agents Passed - 9/6/2021 10:36 AM        Passed - Recent (12 mo) or future (30 days) visit within the authorizing provider's specialty     Patient has had an office visit with the authorizing provider or a provider within the authorizing providers department within the previous 12 mos or has a future within next 30 days. See \"Patient Info\" tab in inbasket, or \"Choose Columns\" in Meds & Orders section of the refill encounter.              Passed - Refill request is not for Imiquimod, 5-Fluorouracil, or Finasteride      If Imiquimod, 5-Fluorouracil, or Finasteride, may refill if indicated in progress notes.           Passed - Medication is active on med list        Passed - Patient is 24 mos old or older             Nolan Lombardi RN 09/06/21 12:44 PM  "

## 2021-09-07 RX ORDER — ERGOCALCIFEROL 1.25 MG/1
50000 CAPSULE ORAL WEEKLY
Qty: 12 CAPSULE | Refills: 1 | Status: SHIPPED | OUTPATIENT
Start: 2021-09-07 | End: 2022-11-09

## 2021-09-17 ENCOUNTER — VIRTUAL VISIT (OUTPATIENT)
Dept: FAMILY MEDICINE | Facility: CLINIC | Age: 36
End: 2021-09-17
Payer: COMMERCIAL

## 2021-09-17 DIAGNOSIS — F41.9 ANXIETY: Primary | ICD-10-CM

## 2021-09-17 DIAGNOSIS — K59.04 CHRONIC IDIOPATHIC CONSTIPATION: ICD-10-CM

## 2021-09-17 DIAGNOSIS — L98.9 PRECANCEROUS SKIN LESION: ICD-10-CM

## 2021-09-17 PROCEDURE — 99214 OFFICE O/P EST MOD 30 MIN: CPT | Mod: GT | Performed by: FAMILY MEDICINE

## 2021-09-17 RX ORDER — POLYETHYLENE GLYCOL 3350 17 G/17G
1 POWDER, FOR SOLUTION ORAL DAILY
Qty: 527 G | Refills: 1 | Status: SHIPPED | OUTPATIENT
Start: 2021-09-17 | End: 2022-03-14

## 2021-09-17 RX ORDER — AMOXICILLIN 250 MG
1 CAPSULE ORAL 2 TIMES DAILY
Qty: 180 TABLET | Refills: 1 | Status: SHIPPED | OUTPATIENT
Start: 2021-09-17 | End: 2022-11-09

## 2021-09-17 RX ORDER — BUPROPION HYDROCHLORIDE 450 MG/1
450 TABLET, FILM COATED, EXTENDED RELEASE ORAL DAILY
Qty: 90 TABLET | Refills: 1 | Status: SHIPPED | OUTPATIENT
Start: 2021-09-17 | End: 2021-10-05 | Stop reason: SINTOL

## 2021-09-17 ASSESSMENT — ENCOUNTER SYMPTOMS
NERVOUS/ANXIOUS: 1
CONSTIPATION: 1

## 2021-09-17 ASSESSMENT — PATIENT HEALTH QUESTIONNAIRE - PHQ9: SUM OF ALL RESPONSES TO PHQ QUESTIONS 1-9: 9
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
## 2021-10-04 ENCOUNTER — TELEPHONE (OUTPATIENT)
Dept: FAMILY MEDICINE | Facility: CLINIC | Age: 36
End: 2021-10-04

## 2021-10-04 DIAGNOSIS — F41.9 ANXIETY: ICD-10-CM

## 2021-10-04 NOTE — TELEPHONE ENCOUNTER
Reason for Call:  Other prescription    Detailed comments: Patient called to request provider to change her medication, buPROPion back to the lower dose of 300 mg. She stated that the 450 mg makes her feel sick. She still does have 2 weeks left of the lower dosage and would like to know if she can start taking that again today? Please send new RX to pharmacy and call patient to advise.    Phone Number Patient can be reached at: Home number on file [phone] (home)    Best Time: any    Can we leave a detailed message on this number? YES    Call taken on 10/4/2021 at 8:46 AM by Flora Luevano

## 2021-10-04 NOTE — TELEPHONE ENCOUNTER
Patient called to request provider to change her medication, buPROPion back to the lower dose of 300 mg. She stated that the 450 mg makes her feel sick. She still does have 2 weeks left of the lower dosage and would like to know if she can start taking that again today? Please send new RX to pharmacy and call patient to advise.

## 2021-10-05 RX ORDER — BUPROPION HYDROCHLORIDE 300 MG/1
300 TABLET ORAL DAILY
Qty: 90 TABLET | Refills: 1 | Status: SHIPPED | OUTPATIENT
Start: 2021-10-05 | End: 2022-10-02

## 2021-10-06 ENCOUNTER — APPOINTMENT (OUTPATIENT)
Dept: URBAN - METROPOLITAN AREA CLINIC 260 | Age: 36
Setting detail: DERMATOLOGY
End: 2021-10-07

## 2021-10-06 VITALS — HEIGHT: 62 IN | WEIGHT: 135 LBS

## 2021-10-06 DIAGNOSIS — L90.5 SCAR CONDITIONS AND FIBROSIS OF SKIN: ICD-10-CM

## 2021-10-06 DIAGNOSIS — L82.1 OTHER SEBORRHEIC KERATOSIS: ICD-10-CM

## 2021-10-06 PROCEDURE — OTHER LIQUID NITROGEN (COSMETIC): OTHER

## 2021-10-06 PROCEDURE — 99212 OFFICE O/P EST SF 10 MIN: CPT

## 2021-10-06 PROCEDURE — OTHER ADDITIONAL NOTES: OTHER

## 2021-10-06 PROCEDURE — OTHER COUNSELING: OTHER

## 2021-10-06 ASSESSMENT — LOCATION SIMPLE DESCRIPTION DERM
LOCATION SIMPLE: LEFT CHEEK
LOCATION SIMPLE: GROIN

## 2021-10-06 ASSESSMENT — LOCATION ZONE DERM
LOCATION ZONE: TRUNK
LOCATION ZONE: FACE

## 2021-10-06 ASSESSMENT — LOCATION DETAILED DESCRIPTION DERM
LOCATION DETAILED: LEFT CENTRAL MALAR CHEEK
LOCATION DETAILED: RIGHT INGUINAL CREASE

## 2021-10-06 NOTE — PROCEDURE: ADDITIONAL NOTES
Render Risk Assessment In Note?: no
Detail Level: Detailed
Additional Notes: No charge follow up; cosmetic touch up.
Detail Level: Zone
Additional Notes: Recommend a full skin exam this winter.

## 2021-10-06 NOTE — PROCEDURE: LIQUID NITROGEN (COSMETIC)
Render Post-Care Instructions In Note?: no
Consent: The patient's consent was obtained including but not limited to risks of crusting, scabbing, blistering, scarring, darker or lighter pigmentary change, recurrence, incomplete removal and infection. The patient understands that the procedure is cosmetic in nature and is not covered by insurance.
Price (Use Numbers Only, No Special Characters Or $): 0
Billing Information: Bill by Static Price
Detail Level: Detailed
Post-Care Instructions: I reviewed with the patient in detail post-care instructions. Patient is to wear sunprotection, and avoid picking at any of the treated lesions. Pt may apply Vaseline to crusted or scabbing areas.

## 2021-10-16 ENCOUNTER — HEALTH MAINTENANCE LETTER (OUTPATIENT)
Age: 36
End: 2021-10-16

## 2021-10-19 PROBLEM — F32.9 MAJOR DEPRESSION: Status: ACTIVE | Noted: 2019-05-27

## 2021-11-29 ENCOUNTER — E-VISIT (OUTPATIENT)
Dept: URGENT CARE | Facility: URGENT CARE | Age: 36
End: 2021-11-29
Payer: COMMERCIAL

## 2021-11-29 DIAGNOSIS — Z20.822 SUSPECTED COVID-19 VIRUS INFECTION: Primary | ICD-10-CM

## 2021-11-29 PROCEDURE — 99421 OL DIG E/M SVC 5-10 MIN: CPT | Performed by: PHYSICIAN ASSISTANT

## 2021-11-29 NOTE — PATIENT INSTRUCTIONS
Dear Alia Wilson,    Your symptoms show that you may have coronavirus (COVID-19). This illness can cause fever, cough and trouble breathing. Many people get a mild case and get better on their own. Some people can get very sick.    Will I be tested for COVID-19?  We would like to test you for Covid-19 virus. I have placed orders for this test.     To schedule: go to your [employer] home page and scroll down to the section that says  You have an appointment that needs to be scheduled  and click the large green button that says  Schedule Now  and follow the steps to find the next available openings.    If you are unable to complete these [employer] scheduling steps, please call [phone] to schedule your testing.     Return to work/school/ guidance:  Please let your workplace manager and staffing office know when your quarantine ends     We can t give you an exact date as it depends on the above. You can calculate this on your own or work with your manager/staffing office to calculate this. (For example if you were exposed on 10/4, you would have to quarantine for 14 full days. That would be through 10/18. You could return on 10/19.)      If you receive a positive COVID-19 test result, follow the guidance of the those who are giving you the results. Usually the return to work is 10 (or in some cases 20 days from symptom onset.) If you work at [facility], you must also be cleared by Employee Occupational Health and Safety to return to work.        If you receive a negative COVID-19 test result and did not have a high risk exposure to someone with a known positive COVID-19 test, you can return to work once you're free of fever for 24 hours without fever-reducing medication and your symptoms are improving or resolved.      If you receive a negative COVID-19 test and If you had a high risk exposure to someone who has tested positive for COVID-19 then you can return to work 14 days after your last contact  with the positive individual    Note: If you have ongoing exposure to the covid positive person, this quarantine period may be more than 14 days. (For example, if you are continued to be exposed to your child who tested positive and cannot isolate from them, then the quarantine of 7-14 days can't start until your child is no longer contagious. This is typically 10 days from onset of the child's symptoms. So the total duration may be 17-24 days in this case.)    Sign up for [employer].   We know it's scary to hear that you might have COVID-19. We want to track your symptoms to make sure you're okay over the next 2 weeks. Please look for an email from [employer]--this is a free, online program that we'll use to keep in touch. To sign up, follow the link in the email you will receive. Learn more at [email or web address]    How can I take care of myself?    Get lots of rest. Drink extra fluids (unless a doctor has told you not to)    Take Tylenol (acetaminophen) or ibuprofen for fever or pain. If you have liver or kidney problems, ask your family doctor if it's okay to take Tylenol o ibuprofen    If you have other health problems (like cancer, heart failure, an organ transplant or severe kidney disease): Call your specialty clinic if you don't feel better in the next 2 days.    Know when to call 911. Emergency warning signs include:  o Trouble breathing or shortness of breath  o Pain or pressure in the chest that doesn't go away  o Feeling confused like you haven't felt before, or not being able to wake up  o Bluish-colored lips or face    Where can I get more information?  [facility] - About COVID-19:   [email or web address]    CDC - What to Do If You're Sick:   www.cdc.gov/coronavirus/2019-ncov/about/steps-when-sick.html    November 29, 2021  RE:  Alia Wilson                                                                                                                  [address] [place] [address]      To whom it may concern:    I evaluated Alia Wilson on November 29, 2021. Alia Wilson should be excused from work/school.     They should let their workplace manager and staffing office know when their quarantine ends.    We can not give an exact date as it depends on the information below. They can calculate this on their own or work with their manager/staffing office to calculate this. (For example if they were exposed on 10/04, they would have to quarantine for 14 full days. That would be through 10/18. They could return on 10/19.)    Quarantine Guidelines:      If patient receives a positive COVID-19 test result, they should follow the guidance of those who are giving the results. Usually the return to work is 10 (or in some cases 20 days from symptom onset.) If they work at [employer], they must be cleared by Employee Occupational Health and Safety to return to work.        If patient receives a negative COVID-19 test result and did not have a high risk exposure to someone with a known positive COVID-19 test, they can return to work once they're free of fever for 24 hours without fever-reducing medication and their symptoms are improving or resolved.      If patient receives a negative COVID-19 test and if they had a high risk exposure to someone who has tested positive for COVID-19 then they can return to work 14 days after their last contact with the positive individual    Note: If there is ongoing exposure to the covid positive person, this quarantine period may be longer than 14 days. (For example, if they are continually exposed to their child, who tested positive and cannot isolate from them, then the quarantine of 7-14 days can't start until their child is no longer contagious. This is typically 10 days from onset to the child's symptoms. So the total duration may be 17-24 days in this case.)     Sincerely,  Gwendolyn Griffin PA-C

## 2021-11-30 ENCOUNTER — LAB (OUTPATIENT)
Dept: FAMILY MEDICINE | Facility: CLINIC | Age: 36
End: 2021-11-30
Attending: PHYSICIAN ASSISTANT
Payer: COMMERCIAL

## 2021-11-30 DIAGNOSIS — Z20.822 SUSPECTED COVID-19 VIRUS INFECTION: ICD-10-CM

## 2021-11-30 PROCEDURE — U0005 INFEC AGEN DETEC AMPLI PROBE: HCPCS

## 2021-11-30 PROCEDURE — U0003 INFECTIOUS AGENT DETECTION BY NUCLEIC ACID (DNA OR RNA); SEVERE ACUTE RESPIRATORY SYNDROME CORONAVIRUS 2 (SARS-COV-2) (CORONAVIRUS DISEASE [COVID-19]), AMPLIFIED PROBE TECHNIQUE, MAKING USE OF HIGH THROUGHPUT TECHNOLOGIES AS DESCRIBED BY CMS-2020-01-R: HCPCS

## 2021-12-01 ENCOUNTER — NURSE TRIAGE (OUTPATIENT)
Dept: NURSING | Facility: CLINIC | Age: 36
End: 2021-12-01
Payer: COMMERCIAL

## 2021-12-01 ENCOUNTER — HOSPITAL ENCOUNTER (EMERGENCY)
Facility: HOSPITAL | Age: 36
Discharge: LEFT BEFORE DISCHARGE | End: 2021-12-01
Admitting: STUDENT IN AN ORGANIZED HEALTH CARE EDUCATION/TRAINING PROGRAM
Payer: COMMERCIAL

## 2021-12-01 VITALS
OXYGEN SATURATION: 97 % | HEIGHT: 62 IN | TEMPERATURE: 98.9 F | SYSTOLIC BLOOD PRESSURE: 110 MMHG | BODY MASS INDEX: 23.92 KG/M2 | RESPIRATION RATE: 17 BRPM | HEART RATE: 93 BPM | DIASTOLIC BLOOD PRESSURE: 70 MMHG | WEIGHT: 130 LBS

## 2021-12-01 LAB
ANION GAP SERPL CALCULATED.3IONS-SCNC: 13 MMOL/L (ref 5–18)
ATRIAL RATE - MUSE: 91 BPM
BUN SERPL-MCNC: 9 MG/DL (ref 8–22)
CALCIUM SERPL-MCNC: 10.1 MG/DL (ref 8.5–10.5)
CHLORIDE BLD-SCNC: 104 MMOL/L (ref 98–107)
CO2 SERPL-SCNC: 21 MMOL/L (ref 22–31)
CREAT SERPL-MCNC: 0.86 MG/DL (ref 0.6–1.1)
DIASTOLIC BLOOD PRESSURE - MUSE: NORMAL MMHG
ERYTHROCYTE [DISTWIDTH] IN BLOOD BY AUTOMATED COUNT: 13.2 % (ref 10–15)
GFR SERPL CREATININE-BSD FRML MDRD: 87 ML/MIN/1.73M2
GLUCOSE BLD-MCNC: 88 MG/DL (ref 70–125)
HCT VFR BLD AUTO: 44.3 % (ref 35–47)
HGB BLD-MCNC: 14.5 G/DL (ref 11.7–15.7)
INTERPRETATION ECG - MUSE: NORMAL
MCH RBC QN AUTO: 29.8 PG (ref 26.5–33)
MCHC RBC AUTO-ENTMCNC: 32.7 G/DL (ref 31.5–36.5)
MCV RBC AUTO: 91 FL (ref 78–100)
P AXIS - MUSE: 60 DEGREES
PLATELET # BLD AUTO: 384 10E3/UL (ref 150–450)
POTASSIUM BLD-SCNC: 4.2 MMOL/L (ref 3.5–5)
PR INTERVAL - MUSE: 142 MS
QRS DURATION - MUSE: 84 MS
QT - MUSE: 336 MS
QTC - MUSE: 413 MS
R AXIS - MUSE: 1 DEGREES
RBC # BLD AUTO: 4.86 10E6/UL (ref 3.8–5.2)
SARS-COV-2 RNA RESP QL NAA+PROBE: POSITIVE
SODIUM SERPL-SCNC: 138 MMOL/L (ref 136–145)
SYSTOLIC BLOOD PRESSURE - MUSE: NORMAL MMHG
T AXIS - MUSE: 46 DEGREES
VENTRICULAR RATE- MUSE: 91 BPM
WBC # BLD AUTO: 6.1 10E3/UL (ref 4–11)

## 2021-12-01 PROCEDURE — 250N000013 HC RX MED GY IP 250 OP 250 PS 637: Performed by: PHYSICIAN ASSISTANT

## 2021-12-01 PROCEDURE — 93005 ELECTROCARDIOGRAM TRACING: CPT | Performed by: PHYSICIAN ASSISTANT

## 2021-12-01 PROCEDURE — 85027 COMPLETE CBC AUTOMATED: CPT | Performed by: PHYSICIAN ASSISTANT

## 2021-12-01 PROCEDURE — 36415 COLL VENOUS BLD VENIPUNCTURE: CPT | Performed by: PHYSICIAN ASSISTANT

## 2021-12-01 PROCEDURE — 999N000104 HC STATISTIC NO CHARGE

## 2021-12-01 PROCEDURE — 80048 BASIC METABOLIC PNL TOTAL CA: CPT | Performed by: PHYSICIAN ASSISTANT

## 2021-12-01 RX ORDER — ACETAMINOPHEN 325 MG/1
975 TABLET ORAL ONCE
Status: COMPLETED | OUTPATIENT
Start: 2021-12-01 | End: 2021-12-01

## 2021-12-01 RX ADMIN — ACETAMINOPHEN 975 MG: 325 TABLET ORAL at 07:52

## 2021-12-01 ASSESSMENT — MIFFLIN-ST. JEOR: SCORE: 1227.93

## 2021-12-01 NOTE — ED TRIAGE NOTES
Pt here with fever, sob, headache that started yesterday. Daughter COVID+, pt is unvaccinated. Took 1 ibuprofen last night at around 2300, states it didn't work. Has been unable to sleep.

## 2021-12-01 NOTE — ED PROVIDER NOTES
Called for patient in ER, No answer, Will have nurses check outside.     Lyssa Acosta MD  12/01/21 3252
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
## 2021-12-03 ENCOUNTER — NURSE TRIAGE (OUTPATIENT)
Dept: NURSING | Facility: CLINIC | Age: 36
End: 2021-12-03
Payer: COMMERCIAL

## 2021-12-03 NOTE — TELEPHONE ENCOUNTER
Alia tested positive for COVID on 11/30.  Symptoms started on 11/28:  mild chest heaviness  Cough  Sore throat  headaches chills  Slight fever at the very beginning. No fever now.    She asks about quarantine. FNA advised that letter was sent via [employer] with this information:    How can I protect others?  If you have symptoms, stay home and away from others (self-isolate) until:    You ve had no fever--and no medicine that reduces fever--for 1 full day (24 hours). And      Your other symptoms have gotten better. For example, your cough or breathing has improved. And     At least 10 days have passed since your symptoms started. (If you've been told by a doctor that you have a weak immune system, wait 20 days).    As soon as she meets above criteria, can go off isolation on 12/9/21.    She verbalized understanding.    Dior Ramey RN/[place] Nurse Advisor      Additional Information    Information only question and nurse able to answer    Protocols used: INFORMATION ONLY CALL - NO TRIAGE-A-OH

## 2021-12-22 ENCOUNTER — VIRTUAL VISIT (OUTPATIENT)
Dept: INTERNAL MEDICINE | Facility: CLINIC | Age: 36
End: 2021-12-22
Payer: COMMERCIAL

## 2021-12-22 DIAGNOSIS — R09.A2 SENSATION OF LUMP IN THROAT: ICD-10-CM

## 2021-12-22 DIAGNOSIS — E01.0 THYROMEGALY: Primary | ICD-10-CM

## 2021-12-22 DIAGNOSIS — R13.19 OTHER DYSPHAGIA: ICD-10-CM

## 2021-12-22 PROCEDURE — 99203 OFFICE O/P NEW LOW 30 MIN: CPT | Mod: GT | Performed by: INTERNAL MEDICINE

## 2021-12-22 NOTE — PROGRESS NOTES
Alia is a 36 year old who is being evaluated via a billable video visit.      How would you like to obtain your AVS? MyChart  If the video visit is dropped, the invitation should be resent by: Text to cell phone: [phone]  Will anyone else be joining your video visit? No    Video Start Time: 11:14 AM    Assessment & Plan     Thyromegaly: Patient has noticed that she has a sensation of something in her throat, around her larynx, it is more difficult for her to swallow and get food to go down, but she always has this sensation of something being there, even after getting food down. Today's visit with me is a virtual visit, but I think that her left lobe of her thyroid looks enlarged as she swallows. Referral for ENT.  - Otolaryngology Referral    Other dysphagia  - Otolaryngology Referral    Sensation of lump in throat  - Otolaryngology Referral       No follow-ups on file.    Laina Alvarez MD  [facility]   Alia is a 36 year old who presents for the following health issues, by herself.    HPI   Video visit today. Isolation ended on the 8th Dec (had a cough, sweats, headache Sunday after [employer]anks[employer] symptoms, took test 11/30). Swallowing issues the last week and a half. Below voice box. Hard to swallow food. After eating still has the sensation.  More like a pressure than a pain.    Adderall as needed.    Review of Systems   Rest of the review systems is negative.      Objective           Vitals:  No vitals were obtained today due to virtual visit.    Physical Exam   GENERAL: Healthy, alert and no distress  EYES: Eyes grossly normal to inspection.  No discharge or erythema, or obvious scleral/conjunctival abnormalities.  NECK: She appears to have thyroid enlargement I can see on the left side of her neck on swallowing.  RESP: No audible wheeze, cough, or visible cyanosis.  No visible retractions or increased work of breathing.    SKIN: Visible skin  clear. No significant rash, abnormal pigmentation or lesions.  NEURO: Cranial nerves grossly intact.  Mentation and speech appropriate for age.  PSYCH: Mentation appears normal, affect normal/bright, judgement and insight intact, normal speech and appearance well-groomed.    Video-Visit Details    Type of service:  Video Visit    Video End Time:11:24 AM    Originating Location (pt. Location): Home    Distant Location (provider location):  [facility]     Platform used for Video Visit: Regina

## 2022-01-12 ENCOUNTER — APPOINTMENT (OUTPATIENT)
Dept: URBAN - METROPOLITAN AREA CLINIC 260 | Age: 37
Setting detail: DERMATOLOGY
End: 2022-01-13

## 2022-01-12 VITALS — WEIGHT: 125 LBS | RESPIRATION RATE: 14 BRPM | HEIGHT: 55 IN

## 2022-01-12 DIAGNOSIS — D22 MELANOCYTIC NEVI: ICD-10-CM

## 2022-01-12 DIAGNOSIS — D18.0 HEMANGIOMA: ICD-10-CM

## 2022-01-12 DIAGNOSIS — L82.1 OTHER SEBORRHEIC KERATOSIS: ICD-10-CM

## 2022-01-12 DIAGNOSIS — L90.5 SCAR CONDITIONS AND FIBROSIS OF SKIN: ICD-10-CM

## 2022-01-12 PROBLEM — D22.62 MELANOCYTIC NEVI OF LEFT UPPER LIMB, INCLUDING SHOULDER: Status: ACTIVE | Noted: 2022-01-12

## 2022-01-12 PROBLEM — D22.61 MELANOCYTIC NEVI OF RIGHT UPPER LIMB, INCLUDING SHOULDER: Status: ACTIVE | Noted: 2022-01-12

## 2022-01-12 PROBLEM — D22.72 MELANOCYTIC NEVI OF LEFT LOWER LIMB, INCLUDING HIP: Status: ACTIVE | Noted: 2022-01-12

## 2022-01-12 PROBLEM — D22.71 MELANOCYTIC NEVI OF RIGHT LOWER LIMB, INCLUDING HIP: Status: ACTIVE | Noted: 2022-01-12

## 2022-01-12 PROBLEM — D22.5 MELANOCYTIC NEVI OF TRUNK: Status: ACTIVE | Noted: 2022-01-12

## 2022-01-12 PROBLEM — D18.01 HEMANGIOMA OF SKIN AND SUBCUTANEOUS TISSUE: Status: ACTIVE | Noted: 2022-01-12

## 2022-01-12 PROCEDURE — OTHER ADDITIONAL NOTES: OTHER

## 2022-01-12 PROCEDURE — OTHER COUNSELING: OTHER

## 2022-01-12 PROCEDURE — 99213 OFFICE O/P EST LOW 20 MIN: CPT

## 2022-01-12 ASSESSMENT — LOCATION DETAILED DESCRIPTION DERM
LOCATION DETAILED: LEFT MEDIAL 5TH TOE
LOCATION DETAILED: RIGHT LATERAL BREAST 8-9:00 REGION
LOCATION DETAILED: RIGHT SUPERIOR MEDIAL UPPER BACK
LOCATION DETAILED: LEFT MID-UPPER BACK
LOCATION DETAILED: RIGHT MEDIAL PLANTAR MIDFOOT
LOCATION DETAILED: RIGHT PROXIMAL POSTERIOR UPPER ARM
LOCATION DETAILED: RIGHT DISTAL POSTERIOR UPPER ARM
LOCATION DETAILED: LEFT DISTAL DORSAL FOREARM
LOCATION DETAILED: RIGHT LATERAL BREAST 8-9:00 REGION
LOCATION DETAILED: 2ND WEBSPACE LEFT FOOT
LOCATION DETAILED: RIGHT INFERIOR MEDIAL UPPER BACK
LOCATION DETAILED: SUPERIOR THORACIC SPINE
LOCATION DETAILED: LEFT AXILLARY TAIL OF BREAST
LOCATION DETAILED: RIGHT INGUINAL CREASE
LOCATION DETAILED: LEFT CENTRAL MALAR CHEEK

## 2022-01-12 ASSESSMENT — LOCATION ZONE DERM
LOCATION ZONE: FEET
LOCATION ZONE: FACE
LOCATION ZONE: TRUNK
LOCATION ZONE: TOE
LOCATION ZONE: TRUNK
LOCATION ZONE: ARM

## 2022-01-12 ASSESSMENT — LOCATION SIMPLE DESCRIPTION DERM
LOCATION SIMPLE: RIGHT BREAST
LOCATION SIMPLE: LEFT CHEEK
LOCATION SIMPLE: UPPER BACK
LOCATION SIMPLE: LEFT FOREARM
LOCATION SIMPLE: LEFT BREAST
LOCATION SIMPLE: LEFT UPPER BACK
LOCATION SIMPLE: RIGHT UPPER BACK
LOCATION SIMPLE: RIGHT POSTERIOR UPPER ARM
LOCATION SIMPLE: RIGHT PLANTAR SURFACE
LOCATION SIMPLE: LEFT 5TH TOE
LOCATION SIMPLE: GROIN
LOCATION SIMPLE: LEFT FOOT
LOCATION SIMPLE: RIGHT BREAST

## 2022-01-12 NOTE — PROCEDURE: ADDITIONAL NOTES
Detail Level: Detailed
Additional Notes: No charge follow up; cosmetic touch up.
Render Risk Assessment In Note?: no

## 2022-01-26 ENCOUNTER — VIRTUAL VISIT (OUTPATIENT)
Dept: FAMILY MEDICINE | Facility: CLINIC | Age: 37
End: 2022-01-26
Payer: COMMERCIAL

## 2022-01-26 DIAGNOSIS — F33.1 MODERATE EPISODE OF RECURRENT MAJOR DEPRESSIVE DISORDER (H): ICD-10-CM

## 2022-01-26 DIAGNOSIS — K59.04 CHRONIC IDIOPATHIC CONSTIPATION: Primary | ICD-10-CM

## 2022-01-26 PROCEDURE — 99213 OFFICE O/P EST LOW 20 MIN: CPT | Mod: GT | Performed by: FAMILY MEDICINE

## 2022-01-26 RX ORDER — ESCITALOPRAM OXALATE 10 MG/1
10 TABLET ORAL DAILY
Qty: 90 TABLET | Refills: 1 | Status: SHIPPED | OUTPATIENT
Start: 2022-01-26 | End: 2022-08-03
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
## 2022-01-30 ASSESSMENT — ENCOUNTER SYMPTOMS
UNEXPECTED WEIGHT CHANGE: 0
CHILLS: 0
DYSPHORIC MOOD: 1
NERVOUS/ANXIOUS: 1
FEVER: 0
ACTIVITY CHANGE: 0
CONSTIPATION: 1
FATIGUE: 1

## 2022-03-03 ENCOUNTER — TRANSFERRED RECORDS (OUTPATIENT)
Dept: HEALTH INFORMATION MANAGEMENT | Facility: CLINIC | Age: 37
End: 2022-03-03
Payer: COMMERCIAL

## 2022-03-11 DIAGNOSIS — K59.04 CHRONIC IDIOPATHIC CONSTIPATION: ICD-10-CM

## 2022-03-11 NOTE — TELEPHONE ENCOUNTER
Reason for Call:  Other     Detailed comments: patient is calling today to let dr nunez know that she had an appt at [facility] and the dr told her to stop the senakot and use the mirolax 2 to 3 times daily instead of once  Shed like to run that by dr nunez.    Phone Number Patient can be reached at: Home number on file [phone] (home)    Best Time: asap    Can we leave a detailed message on this number? YES    Call taken on 3/11/2022 at 10:55 AM by Aster Khan

## 2022-03-14 RX ORDER — POLYETHYLENE GLYCOL 3350 17 G/17G
1 POWDER, FOR SOLUTION ORAL DAILY
Qty: 527 G | Refills: 1 | Status: SHIPPED | OUTPATIENT
Start: 2022-03-14 | End: 2022-06-18

## 2022-05-25 ENCOUNTER — NURSE TRIAGE (OUTPATIENT)
Dept: NURSING | Facility: CLINIC | Age: 37
End: 2022-05-25
Payer: COMMERCIAL

## 2022-05-26 NOTE — TELEPHONE ENCOUNTER
About 4 days ago patient started not being able to hear out of her left ear, it was muffled.  Last night patient got dizzy.  She is also experiencing ringing in her ears.  Patient denies any injury.  Plan is for patient to be evaluated in the next 24 hours.  Called scheduling and no appointments available tomorrow nearby.  Patient will plan to go to [facility] in clinic to be evaluated.    Lyssa Reyes RN   05/25/22 9:11 PM  [facility] Nurse Advisor    Reason for Disposition    Earache    Additional Information    Negative: Followed an ear injury    Negative: Decreased hearing with nasal allergies    Negative: Part of a cold    Negative: Followed air travel or mountain driving    Negative: See something in ear canal    Negative: Earwax, questions about    Negative: Dizziness is main symptom    Negative: Tinnitus is main symptom    Negative: [1] Ringing in the ears (tinnitus) AND [2] taking aspirin AND [3] dosage sounds high (i.e., > 1500 mg/day)    Negative: Patient sounds very sick or weak to the triager    Negative: [1] Hearing loss in one or both ears AND [2] sudden onset AND [3] present now    Negative: [1] SEVERE ear pain AND [2] not improved 2 hours after ibuprofen    Protocols used: HEARING LOSS OR CHANGE-A-AH

## 2022-05-28 ENCOUNTER — HEALTH MAINTENANCE LETTER (OUTPATIENT)
Age: 37
End: 2022-05-28

## 2022-06-18 DIAGNOSIS — K59.04 CHRONIC IDIOPATHIC CONSTIPATION: ICD-10-CM

## 2022-06-18 RX ORDER — POLYETHYLENE GLYCOL 3350 17 G/17G
1 POWDER, FOR SOLUTION ORAL DAILY
Qty: 510 G | Refills: 4 | Status: SHIPPED | OUTPATIENT
Start: 2022-06-18 | End: 2023-01-04

## 2022-06-18 NOTE — TELEPHONE ENCOUNTER
"Last Written Prescription Date:  3/14/22  Last Fill Quantity: 527,  # refills: 1   Last office visit provider:  1/26/22     Requested Prescriptions   Pending Prescriptions Disp Refills     polyethylene glycol (MIRALAX) 17 GM/Dose powder [Pharmacy Med Name: Polyethylene Glycol 3350 Oral Powder 17 GM/SCOOP] 510 g 0     Sig: Take 17 g (1 capful) by mouth daily (as directed for constipation)       Laxatives Protocol Passed - 6/18/2022 10:25 AM        Passed - Patient is age 6 or older        Passed - Recent (12 mo) or future (30 days) visit within the authorizing provider's specialty     Patient has had an office visit with the authorizing provider or a provider within the authorizing providers department within the previous 12 mos or has a future within next 30 days. See \"Patient Info\" tab in inbasket, or \"Choose Columns\" in Meds & Orders section of the refill encounter.              Passed - Medication is active on med list             Leidy Laurent RN 06/18/22 5:45 PM  "

## 2022-08-01 ENCOUNTER — HOSPITAL ENCOUNTER (EMERGENCY)
Facility: HOSPITAL | Age: 37
Discharge: HOME | End: 2022-08-01
Attending: EMERGENCY MEDICINE | Admitting: EMERGENCY MEDICINE
Payer: COMMERCIAL

## 2022-08-01 VITALS
RESPIRATION RATE: 18 BRPM | BODY MASS INDEX: 23.05 KG/M2 | HEART RATE: 65 BPM | TEMPERATURE: 97.3 F | WEIGHT: 126 LBS | OXYGEN SATURATION: 100 % | DIASTOLIC BLOOD PRESSURE: 69 MMHG | SYSTOLIC BLOOD PRESSURE: 103 MMHG

## 2022-08-01 DIAGNOSIS — H81.22 VESTIBULAR NEURITIS, LEFT: ICD-10-CM

## 2022-08-01 LAB
ALBUMIN UR-MCNC: 10 MG/DL
ANION GAP SERPL CALCULATED.3IONS-SCNC: 13 MMOL/L (ref 5–18)
APPEARANCE UR: ABNORMAL
BACTERIA #/AREA URNS HPF: ABNORMAL /HPF
BASOPHILS # BLD AUTO: 0.1 10E3/UL (ref 0–0.2)
BASOPHILS NFR BLD AUTO: 0 %
BILIRUB UR QL STRIP: NEGATIVE
BUN SERPL-MCNC: 12 MG/DL (ref 8–22)
CALCIUM SERPL-MCNC: 9.8 MG/DL (ref 8.5–10.5)
CHLORIDE BLD-SCNC: 104 MMOL/L (ref 98–107)
CO2 SERPL-SCNC: 24 MMOL/L (ref 22–31)
COLOR UR AUTO: ABNORMAL
CREAT SERPL-MCNC: 0.86 MG/DL (ref 0.6–1.1)
EOSINOPHIL # BLD AUTO: 0 10E3/UL (ref 0–0.7)
EOSINOPHIL NFR BLD AUTO: 0 %
ERYTHROCYTE [DISTWIDTH] IN BLOOD BY AUTOMATED COUNT: 12.3 % (ref 10–15)
GFR SERPL CREATININE-BSD FRML MDRD: 88 ML/MIN/1.73M2
GLUCOSE BLD-MCNC: 97 MG/DL (ref 70–125)
GLUCOSE UR STRIP-MCNC: NEGATIVE MG/DL
HCG SERPL QL: NEGATIVE
HCT VFR BLD AUTO: 43.8 % (ref 35–47)
HGB BLD-MCNC: 14.6 G/DL (ref 11.7–15.7)
HGB UR QL STRIP: NEGATIVE
IMM GRANULOCYTES # BLD: 0.1 10E3/UL
IMM GRANULOCYTES NFR BLD: 0 %
KETONES UR STRIP-MCNC: ABNORMAL MG/DL
LEUKOCYTE ESTERASE UR QL STRIP: ABNORMAL
LYMPHOCYTES # BLD AUTO: 1.2 10E3/UL (ref 0.8–5.3)
LYMPHOCYTES NFR BLD AUTO: 7 %
MCH RBC QN AUTO: 30.4 PG (ref 26.5–33)
MCHC RBC AUTO-ENTMCNC: 33.3 G/DL (ref 31.5–36.5)
MCV RBC AUTO: 91 FL (ref 78–100)
MONOCYTES # BLD AUTO: 0.8 10E3/UL (ref 0–1.3)
MONOCYTES NFR BLD AUTO: 5 %
MUCOUS THREADS #/AREA URNS LPF: PRESENT /LPF
NEUTROPHILS # BLD AUTO: 14 10E3/UL (ref 1.6–8.3)
NEUTROPHILS NFR BLD AUTO: 88 %
NITRATE UR QL: NEGATIVE
NRBC # BLD AUTO: 0 10E3/UL
NRBC BLD AUTO-RTO: 0 /100
PH UR STRIP: 8 [PH] (ref 5–7)
PLATELET # BLD AUTO: 456 10E3/UL (ref 150–450)
POTASSIUM BLD-SCNC: 4.2 MMOL/L (ref 3.5–5)
RBC # BLD AUTO: 4.8 10E6/UL (ref 3.8–5.2)
RBC URINE: 9 /HPF
SODIUM SERPL-SCNC: 141 MMOL/L (ref 136–145)
SP GR UR STRIP: 1.02 (ref 1–1.03)
SQUAMOUS EPITHELIAL: 9 /HPF
TROPONIN I SERPL-MCNC: 0.01 NG/ML (ref 0–0.29)
UROBILINOGEN UR STRIP-MCNC: <2 MG/DL
WBC # BLD AUTO: 16.1 10E3/UL (ref 4–11)
WBC URINE: 22 /HPF

## 2022-08-01 PROCEDURE — 258N000003 HC RX IP 258 OP 636: Performed by: EMERGENCY MEDICINE

## 2022-08-01 PROCEDURE — 250N000013 HC RX MED GY IP 250 OP 250 PS 637: Performed by: EMERGENCY MEDICINE

## 2022-08-01 PROCEDURE — 99284 EMERGENCY DEPT VISIT MOD MDM: CPT | Mod: 25

## 2022-08-01 PROCEDURE — 84484 ASSAY OF TROPONIN QUANT: CPT | Performed by: EMERGENCY MEDICINE

## 2022-08-01 PROCEDURE — 87086 URINE CULTURE/COLONY COUNT: CPT | Performed by: EMERGENCY MEDICINE

## 2022-08-01 PROCEDURE — 85049 AUTOMATED PLATELET COUNT: CPT | Performed by: EMERGENCY MEDICINE

## 2022-08-01 PROCEDURE — 80048 BASIC METABOLIC PNL TOTAL CA: CPT | Performed by: EMERGENCY MEDICINE

## 2022-08-01 PROCEDURE — 96374 THER/PROPH/DIAG INJ IV PUSH: CPT

## 2022-08-01 PROCEDURE — 96361 HYDRATE IV INFUSION ADD-ON: CPT

## 2022-08-01 PROCEDURE — 93005 ELECTROCARDIOGRAM TRACING: CPT | Performed by: EMERGENCY MEDICINE

## 2022-08-01 PROCEDURE — 250N000011 HC RX IP 250 OP 636: Performed by: EMERGENCY MEDICINE

## 2022-08-01 PROCEDURE — 81001 URINALYSIS AUTO W/SCOPE: CPT | Performed by: EMERGENCY MEDICINE

## 2022-08-01 PROCEDURE — 36415 COLL VENOUS BLD VENIPUNCTURE: CPT | Performed by: EMERGENCY MEDICINE

## 2022-08-01 PROCEDURE — 84703 CHORIONIC GONADOTROPIN ASSAY: CPT | Performed by: EMERGENCY MEDICINE

## 2022-08-01 RX ORDER — MECLIZINE HCL 12.5 MG 12.5 MG/1
25 TABLET ORAL ONCE
Status: COMPLETED | OUTPATIENT
Start: 2022-08-01 | End: 2022-08-01

## 2022-08-01 RX ORDER — ONDANSETRON 4 MG/1
4 TABLET, ORALLY DISINTEGRATING ORAL EVERY 8 HOURS PRN
Qty: 21 TABLET | Refills: 0 | Status: SHIPPED | OUTPATIENT
Start: 2022-08-01 | End: 2022-08-08

## 2022-08-01 RX ORDER — ONDANSETRON 2 MG/ML
4 INJECTION INTRAMUSCULAR; INTRAVENOUS ONCE
Status: COMPLETED | OUTPATIENT
Start: 2022-08-01 | End: 2022-08-01

## 2022-08-01 RX ORDER — MECLIZINE HCL 12.5 MG 12.5 MG/1
12.5 TABLET ORAL 4 TIMES DAILY PRN
Qty: 30 TABLET | Refills: 0 | Status: SHIPPED | OUTPATIENT
Start: 2022-08-01 | End: 2024-07-19

## 2022-08-01 RX ADMIN — ONDANSETRON 4 MG: 2 INJECTION INTRAMUSCULAR; INTRAVENOUS at 19:59

## 2022-08-01 RX ADMIN — MECLIZINE 25 MG: 12.5 TABLET ORAL at 22:11

## 2022-08-01 RX ADMIN — SODIUM CHLORIDE 1000 ML: 9 INJECTION, SOLUTION INTRAVENOUS at 19:58

## 2022-08-01 ASSESSMENT — ENCOUNTER SYMPTOMS
DIAPHORESIS: 1
ABDOMINAL PAIN: 0
NAUSEA: 1
FEVER: 0
VOMITING: 1
DIZZINESS: 1

## 2022-08-01 NOTE — ED TRIAGE NOTES
Patient arrives by private car with her significant other for evaluation of dizziness.  Patient reports she was on the couch and became dizzy.  Kierra states that when he got home she yelled for him and he came in the room and she was on the floor.  Medics were called and they were advised to come to the ED.  Patient refusing to answer questions.

## 2022-08-01 NOTE — ED NOTES
ED Provider In Triage Note  [facility]  Encounter Date: Aug 1, 2022    Dizziness, possible syncope    Brief HPI:   Alia Wilson is a 36 year old female presenting to the Emergency Department with a chief complaint of dizziness/lightheadedness and possible syncope at home.  Nausea and vomiting.    Brief Physical Exam:  Blood pressure 103/69, heart rate 63, 100% oxygenation.  General: Non-toxic appearing      Plan Initiated in Triage:  Orders Placed This Encounter     Basic metabolic panel     Troponin I     UA with Microscopic reflex to Culture     HCG qualitative Blood     0.9% sodium chloride BOLUS     ondansetron (ZOFRAN) injection 4 mg       PIT Dispo:   Return to [facility] while awaiting workup and ED bed availability    Gabo Kolb MD on 8/1/2022 at 5:13 PM    Patient was evaluated by the Physician in Triage due to a limitation of available rooms in the Emergency Department. A plan of care was discussed based on the information obtained on the initial evaluation and patient was consuled to return back to the Emergency Department lobby after this initial evalutaiton until results were obtained or a room became available in the Emergency Department. Patient was counseled not to leave prior to receiving the results of their workup.     Gabo Kolb MD  [facility] EMERGENCY DEPARTMENT  [address] [place] [id number]  [phone]     Gabo Kolb MD  08/01/22 [id number]

## 2022-08-02 ENCOUNTER — PATIENT OUTREACH (OUTPATIENT)
Dept: CARE COORDINATION | Facility: CLINIC | Age: 37
End: 2022-08-02

## 2022-08-02 DIAGNOSIS — Z71.89 OTHER SPECIFIED COUNSELING: ICD-10-CM

## 2022-08-02 DIAGNOSIS — F33.1 MODERATE EPISODE OF RECURRENT MAJOR DEPRESSIVE DISORDER (H): ICD-10-CM

## 2022-08-02 NOTE — PROGRESS NOTES
Clinic Care Coordination Contact  [facility]/Voicemail    Clinical Data: Care Coordinator Outreach  Outreach attempted x 1. Not Available - Patient's voicemailbox is currently full. CHW was unable to leave a message on patient's voicemail with call back information and a request for a return call.     Plan:Care Coordinator will try to reach patient again in 1-2 business days.    DMITRI Mueller  [phone]  [facility]

## 2022-08-02 NOTE — ED PROVIDER NOTES
EMERGENCY DEPARTMENT ENCOUNTER      NAME: Alia Wilson  AGE: 36 year old female  YOB: 1985  MRN: [id number]  EVALUATION DATE & TIME: 2022  8:55 PM    PCP: Neda Castano    ED PROVIDER: Rimma Mayen M.D.      Chief Complaint   Patient presents with     Dizziness     Syncope         FINAL IMPRESSION:  1. Vestibular neuritis, left        MEDICAL DECISION MAKIN:39 PM I met with the patient, obtained history, performed an initial exam, and discussed options and plan for diagnostics and treatment here in the ED. PPE (gloves, glasses, N95 mask) was worn during patient encounters.    Pertinent Labs & Imaging studies reviewed. (See chart for details)         Alia Wilson is a 36 year oldfemale who presents with vertigo.  Vital signs are normal.  She has been dealing with left-sided hearing loss and nystagmus and symptoms are reproduced by gaze to the left or turning head to the left.  Neurologic examination evaluating central causes of vertigo are normal tonight.  Symptoms are very reproducible and do extinguish when she is lying in bed.  She has no chest pain, shortness of breath or other concerning  findings.  She will get an EKG troponin, basic labs to evaluate for electrolytes, hemoglobin to evaluate for anemia  UA was obtained in triage.    ECG is normal.  No sign of arrhythmia or abnormal intervals that would be cause of syncope or dizziness.  Her labs are all unremarkable including a normal hemoglobin and normal electrolytes.  She has a slightly elevated white blood cell count which could be related to her ear and vertigo symptoms.  She does have an appointment with the ENT next week for evaluation.  After fluids and symptomatic management, she was able to ambulate unassisted.  She did have some slight vertiginous symptoms but tells me they are tolerable and could be managed at home.  She will be discharged with Zofran and meclizine.  I encouraged her to contact ENT  clinic in the morning to see if they can expedite her visit now that she has developed both hearing loss and vertiginous symptoms related predominantly to the left ear.    We discussed warning signs and indications to return to the emergency department.  She understands these and all discharge instructions.     MEDICATIONS GIVEN IN THE EMERGENCY:  Medications   0.9% sodium chloride BOLUS (0 mLs Intravenous Stopped 8/1/22 2055)   ondansetron (ZOFRAN) injection 4 mg (4 mg Intravenous Given 8/1/22 1959)       NEW PRESCRIPTIONS STARTED AT TODAY'S ER VISIT:  Discharge Medication List as of 8/1/2022 10:27 PM      START taking these medications    Details   meclizine (ANTIVERT) 12.5 MG tablet Take 1 tablet (12.5 mg) by mouth 4 times daily as needed for dizziness or nausea, Disp-30 tablet, R-0, Local Print      ondansetron (ZOFRAN ODT) 4 MG ODT tab Take 1 tablet (4 mg) by mouth every 8 hours as needed for nausea or vomiting, Disp-21 tablet, R-0, Local Print                =================================================================    HPI    Patient information was obtained from: patient    Use of : Use of : N/A      Alia Wilson is a 36 year old female with a history of ADHD, anxiety, and depression who presents to the ED by private vehicle for evaluation of syncope and dizziness. Patient states that at 1600 (~6 hours ago) she got dizzy, like the room was spinning, so she sat on the floor. This did not alleviate her dizziness, and it got worse. Patient felt hot, diaphoretic, and she had emesis. Patient was unable to stand, but she states that laying stils alleviates her symptoms. Now, sitting and standing will reproduce her symptoms. Her dizziness was accompanied by tingling in her bilateral hands and feet.    Patient reports that, for the past few months, her hearing has intermittently decreased in her left ear. Since July, the hearing loss has been constant. Patient has been seen by her  primary care provider and urgent care for this. She used steroids prescribed by urgent care, which provided some relief. Patient denies any trauma to her ear, denies facial droop, or recent tick bites. Patient has an appointment with [facility] on 8/10/22.    Patient denies abdominal pain, chest pain, fever, or additional medical concerns or complaints at this time.     REVIEW OF SYSTEMS   Review of Systems   Constitutional: Positive for diaphoresis. Negative for fever.        Positive for feeling hot during dizzy episode   Cardiovascular: Negative for chest pain.   Gastrointestinal: Positive for nausea and vomiting. Negative for abdominal pain.   Neurological: Positive for dizziness (room spinning).        Positive for tingling in bilateral hands and feet   All other systems reviewed and are negative.       PAST MEDICAL HISTORY:  Past Medical History:   Diagnosis Date     Placenta previa 2/6/2015     Pregnancy 4/16/2015       PAST SURGICAL HISTORY:  Past Surgical History:   Procedure Laterality Date     BIOPSY CERVICAL, LOCAL EXCISION, SINGLE/MULTIPLE       CONIZATION CERVIX,KNIFE/LASER      Description: Cervical Conization;  Recorded: 07/30/2008;     CONIZATION CERVIX,KNIFE/LASER      Description: Cervical Conization;  Proc Date: 01/01/2008;       CURRENT MEDICATIONS:    No current facility-administered medications for this encounter.    Current Outpatient Medications:      aluminum chloride (DRYSOL) 20 % external solution, Apply topically At Bedtime, Disp: 60 mL, Rfl: 3     buPROPion (WELLBUTRIN XL) 300 MG 24 hr tablet, Take 1 tablet (300 mg) by mouth daily, Disp: 90 tablet, Rfl: 1     dextroamphetamine-amphetamine (ADDERALL XR) 10 MG 24 hr capsule, [DEXTROAMPHETAMINE-AMPHETAMINE (ADDERALL XR) 10 MG 24 HR CAPSULE] TAKE ONE CAPSULE EVERY DAY, Disp: 30 capsule, Rfl: 0     dextroamphetamine-amphetamine (ADDERALL) 10 mg Tab tablet, [DEXTROAMPHETAMINE-AMPHETAMINE (ADDERALL) 10 MG TAB TABLET] TAKE ONE TABLET BY MOUTH  ONE TIME DAILY, Disp: 30 tablet, Rfl: 0     ergocalciferol (ERGOCALCIFEROL) 1.25 MG (19390 UT) capsule, Take 1 capsule (50,000 Units) by mouth once a week, Disp: 12 capsule, Rfl: 1     escitalopram (LEXAPRO) 10 MG tablet, Take 1 tablet (10 mg) by mouth daily, Disp: 90 tablet, Rfl: 1     polyethylene glycol (MIRALAX) 17 GM/Dose powder, Take 17 g (1 capful) by mouth daily (as directed for constipation), Disp: 510 g, Rfl: 4     senna-docusate (SENOKOT-S/PERICOLACE) 8.6-50 MG tablet, Take 1 tablet by mouth 2 times daily, Disp: 180 tablet, Rfl: 1    ALLERGIES:  Allergies   Allergen Reactions     Percocet [Oxycodone-Acetaminophen] Nausea and Vomiting     Dilaudid [Hydromorphone] Unknown       FAMILY HISTORY:  Family History   Problem Relation Age of Onset     Chronic Obstructive Pulmonary Disease Mother         early     Heart Disease Mother         atypical chest pain     Hypertension Mother      Ovarian Cancer Maternal Grandmother      Breast Cancer Paternal Grandmother      Cervical Cancer Paternal Grandmother        SOCIAL HISTORY:   Social History     Tobacco Use     Smoking status: Former Smoker     Smokeless tobacco: Never Used     Tobacco comment: no smoke exposure   Substance Use Topics     Alcohol use: No     Drug use: No        PHYSICAL EXAM:    Vitals: /69   Pulse 65   Temp 97.3  F (36.3  C) (Tympanic)   Resp 18   Wt 57.2 kg (126 lb)   SpO2 100%   BMI 23.05 kg/m     General:. Alert and interactive, comfortable appearing.  HENT: Oropharynx without erythema or exudates. MMM.  TMs clear bilaterally.  Eyes: Pupils mid-sized and equally reactive.   Neck: Full AROM.  No midline tenderness to palpation.  Cardiovascular: Regular rate and rhythm. Peripheral pulses 2+ bilaterally.  Chest/Pulmonary: Normal work of breathing. Lung sounds clear and equal throughout, no wheezes or crackles. No chest wall tenderness or deformities.  Abdomen: Soft, nondistended. Nontender without guarding or rebound.  Extremities:  Normal ROM of all major joints. No lower extremity edema.   Skin: Warm and dry. Normal skin color.   Neuro: Speech clear. CNs grossly intact. Moves all extremities appropriately. Strength and sensation grossly intact to all extremities. No pronator drift. No dysmetria. Heel shin test normal. Dizziness elicited by leftward gaze.  Psych: Normal affect/mood, cooperative, memory appropriate.     LAB:  All pertinent labs reviewed and interpreted.  Labs Ordered and Resulted from Time of ED Arrival to Time of ED Departure   ROUTINE UA WITH MICROSCOPIC REFLEX TO CULTURE - Abnormal       Result Value    Color Urine Light Yellow      Appearance Urine Turbid (*)     Glucose Urine Negative      Bilirubin Urine Negative      Ketones Urine Trace (*)     Specific Gravity Urine 1.018      Blood Urine Negative      pH Urine 8.0 (*)     Protein Albumin Urine 10  (*)     Urobilinogen Urine <2.0      Nitrite Urine Negative      Leukocyte Esterase Urine 500 Erma/uL (*)     Bacteria Urine Few (*)     Mucus Urine Present (*)     RBC Urine 9 (*)     WBC Urine 22 (*)     Squamous Epithelials Urine 9 (*)    CBC WITH PLATELETS AND DIFFERENTIAL - Abnormal    WBC Count 16.1 (*)     RBC Count 4.80      Hemoglobin 14.6      Hematocrit 43.8      MCV 91      MCH 30.4      MCHC 33.3      RDW 12.3      Platelet Count 456 (*)     % Neutrophils 88      % Lymphocytes 7      % Monocytes 5      % Eosinophils 0      % Basophils 0      % Immature Granulocytes 0      NRBCs per 100 WBC 0      Absolute Neutrophils 14.0 (*)     Absolute Lymphocytes 1.2      Absolute Monocytes 0.8      Absolute Eosinophils 0.0      Absolute Basophils 0.1      Absolute Immature Granulocytes 0.1      Absolute NRBCs 0.0     BASIC METABOLIC PANEL - Normal    Sodium 141      Potassium 4.2      Chloride 104      Carbon Dioxide (CO2) 24      Anion Gap 13      Urea Nitrogen 12      Creatinine 0.86      Calcium 9.8      Glucose 97      GFR Estimate 88     TROPONIN I - Normal    Troponin I  0.01     HCG QUALITATIVE PREGNANCY - Normal    hCG Serum Qualitative Negative     URINE CULTURE       RADIOLOGY:  No orders to display       EKG:   Heart rate 67  Normal sinus rhythm  Normal intervals  No arrhythmia or ST changes  When compared with the ECG of 12-1-2021, no significant change  I have independently reviewed and interpreted the EKG(s) documented above.       I, Elise Gryler, am serving as a scribe to document services personally performed by Dr. Rimma Mayen  based on my observation and the provider's statements to me. I, Rimma Mayen MD attest that Elise Gryler is acting in a scribe capacity, has observed my performance of the services and has documented them in accordance with my direction.      Rimma Mayen M.D.  Emergency Medicine  [facility] EMERGENCY DEPARTMENT  [address] [place] [id number]  [phone]  Dept: [phone]           Rimma Mayen MD  08/02/22 0001

## 2022-08-02 NOTE — DISCHARGE INSTRUCTIONS
Please call your ENT clinic in the morning to discuss your ER visit tonight.  Please mention that you are having dizziness related to your left ear.  Please discuss need to expedite your clinic appointment to an earlier date.    Use meclizine up to 4 times daily as needed for dizziness or nausea.    You may also use Zofran 4 mg every 8 hours as needed for nausea or vomiting.      If you are unable to manage your symptoms at home or you develop numbness, tingling or weakness on just 1 side of the body or have any visual loss, please return to the emergency department.

## 2022-08-03 ENCOUNTER — TRANSFERRED RECORDS (OUTPATIENT)
Dept: HEALTH INFORMATION MANAGEMENT | Facility: CLINIC | Age: 37
End: 2022-08-03

## 2022-08-03 LAB
ATRIAL RATE - MUSE: 67 BPM
DIASTOLIC BLOOD PRESSURE - MUSE: NORMAL MMHG
INTERPRETATION ECG - MUSE: NORMAL
P AXIS - MUSE: 53 DEGREES
PR INTERVAL - MUSE: 114 MS
QRS DURATION - MUSE: 80 MS
QT - MUSE: 446 MS
QTC - MUSE: 471 MS
R AXIS - MUSE: 39 DEGREES
SYSTOLIC BLOOD PRESSURE - MUSE: NORMAL MMHG
T AXIS - MUSE: 60 DEGREES
VENTRICULAR RATE- MUSE: 67 BPM

## 2022-08-03 RX ORDER — ESCITALOPRAM OXALATE 10 MG/1
TABLET ORAL
Qty: 90 TABLET | Refills: 0 | Status: SHIPPED | OUTPATIENT
Start: 2022-08-03 | End: 2022-11-09

## 2022-08-03 NOTE — PROGRESS NOTES
Clinic Care Coordination Contact  [facility]/Voicemail    Clinical Data: Care Coordinator Outreach  Outreach attempted x 2. Not Available - Patient's voicemailbox is currently full. CHW was unable to leave a message on patient's voicemail with call back information and a request for a return call.     Plan:Care Coordinator will do no further outreaches at this time.    DMITRI Mueller  [phone]  [facility]

## 2022-08-03 NOTE — TELEPHONE ENCOUNTER
"Routing refill request to provider for review/approval because:  PHQ-9 Score overdue.   Needs to be seen, due for 6 month office visit.     Last Written Prescription Date:  1/26/2022  Last Fill Quantity: 90,  # refills: 1   Last office visit provider:  1/26/2022     Requested Prescriptions   Pending Prescriptions Disp Refills     escitalopram (LEXAPRO) 10 MG tablet [Pharmacy Med Name: Escitalopram Oxalate Oral Tablet 10 MG] 90 tablet 0     Sig: Take 1 tablet by mouth daily.       SSRIs Protocol Failed - 8/3/2022  4:39 AM        Failed - PHQ-9 score less than 5 in past 6 months     Please review last PHQ-9 score.           Failed - Recent (6 mo) or future (30 days) visit within the authorizing provider's specialty     Patient had office visit in the last 6 months or has a visit in the next 30 days with authorizing provider or within the authorizing provider's specialty.  See \"Patient Info\" tab in inbasket, or \"Choose Columns\" in Meds & Orders section of the refill encounter.            Passed - Medication is active on med list        Passed - Patient is age 18 or older        Passed - No active pregnancy on record        Passed - No positive pregnancy test in last 12 months             Cathleen Allen RN 08/03/22 4:39 AM  "

## 2022-08-04 LAB — BACTERIA UR CULT: NORMAL

## 2022-10-01 ENCOUNTER — HEALTH MAINTENANCE LETTER (OUTPATIENT)
Age: 37
End: 2022-10-01

## 2022-10-01 DIAGNOSIS — F41.9 ANXIETY: ICD-10-CM

## 2022-10-02 RX ORDER — BUPROPION HYDROCHLORIDE 300 MG/1
TABLET ORAL
Qty: 90 TABLET | Refills: 0 | Status: SHIPPED | OUTPATIENT
Start: 2022-10-02 | End: 2022-11-09

## 2022-10-02 NOTE — TELEPHONE ENCOUNTER
"Last Written Prescription Date:  10/5/21  Last Fill Quantity: 90,  # refills: 1   Last office visit provider:  1/26/22     Requested Prescriptions   Pending Prescriptions Disp Refills     buPROPion (WELLBUTRIN XL) 300 MG 24 hr tablet [Pharmacy Med Name: buPROPion HCl ER (XL) Oral Tablet Extended Release 24 Hour 300 MG] 90 tablet 0     Sig: TAKE ONE TABLET BY MOUTH ONE TIME DAILY       SSRIs Protocol Passed - 10/1/2022 10:34 AM        Passed - Recent (12 mo) or future (30 days) visit within the authorizing provider's specialty     Patient has had an office visit with the authorizing provider or a provider within the authorizing providers department within the previous 12 mos or has a future within next 30 days. See \"Patient Info\" tab in inbasket, or \"Choose Columns\" in Meds & Orders section of the refill encounter.              Passed - Medication is Bupropion     If the medication is Bupropion (Wellbutrin), and the patient is taking for smoking cessation; OK to refill.          Passed - Medication is active on med list        Passed - Patient is age 18 or older        Passed - No active pregnancy on record        Passed - No positive pregnancy test in last 12 months             Leidy Laurent, RN 10/02/22 3:09 PM  "

## 2022-11-09 ENCOUNTER — VIRTUAL VISIT (OUTPATIENT)
Dept: FAMILY MEDICINE | Facility: OTHER | Age: 37
End: 2022-11-09
Payer: COMMERCIAL

## 2022-11-09 DIAGNOSIS — F41.1 GAD (GENERALIZED ANXIETY DISORDER): ICD-10-CM

## 2022-11-09 DIAGNOSIS — F90.0 ATTENTION DEFICIT HYPERACTIVITY DISORDER (ADHD), PREDOMINANTLY INATTENTIVE TYPE: ICD-10-CM

## 2022-11-09 DIAGNOSIS — F33.1 MODERATE EPISODE OF RECURRENT MAJOR DEPRESSIVE DISORDER (H): ICD-10-CM

## 2022-11-09 PROBLEM — F90.2 ATTENTION DEFICIT HYPERACTIVITY DISORDER (ADHD), COMBINED TYPE: Status: ACTIVE | Noted: 2022-11-09

## 2022-11-09 PROCEDURE — 99214 OFFICE O/P EST MOD 30 MIN: CPT | Performed by: PHYSICIAN ASSISTANT

## 2022-11-09 RX ORDER — BUPROPION HYDROCHLORIDE 300 MG/1
300 TABLET ORAL DAILY
Qty: 90 TABLET | Refills: 1 | Status: SHIPPED | OUTPATIENT
Start: 2022-11-09 | End: 2023-11-12

## 2022-11-09 RX ORDER — DEXTROAMPHETAMINE SACCHARATE, AMPHETAMINE ASPARTATE MONOHYDRATE, DEXTROAMPHETAMINE SULFATE AND AMPHETAMINE SULFATE 2.5; 2.5; 2.5; 2.5 MG/1; MG/1; MG/1; MG/1
10 CAPSULE, EXTENDED RELEASE ORAL EVERY MORNING
Qty: 30 CAPSULE | Refills: 0 | Status: SHIPPED | OUTPATIENT
Start: 2022-12-09 | End: 2023-06-23

## 2022-11-09 RX ORDER — HYDROXYZINE HYDROCHLORIDE 25 MG/1
25-50 TABLET, FILM COATED ORAL 3 TIMES DAILY PRN
Qty: 45 TABLET | Refills: 0 | Status: SHIPPED | OUTPATIENT
Start: 2022-11-09 | End: 2023-01-18

## 2022-11-09 RX ORDER — DEXTROAMPHETAMINE SACCHARATE, AMPHETAMINE ASPARTATE MONOHYDRATE, DEXTROAMPHETAMINE SULFATE AND AMPHETAMINE SULFATE 2.5; 2.5; 2.5; 2.5 MG/1; MG/1; MG/1; MG/1
10 CAPSULE, EXTENDED RELEASE ORAL EVERY MORNING
Qty: 30 CAPSULE | Refills: 0 | Status: SHIPPED | OUTPATIENT
Start: 2022-11-09 | End: 2022-11-09

## 2022-11-09 RX ORDER — DEXTROAMPHETAMINE SACCHARATE, AMPHETAMINE ASPARTATE, DEXTROAMPHETAMINE SULFATE AND AMPHETAMINE SULFATE 2.5; 2.5; 2.5; 2.5 MG/1; MG/1; MG/1; MG/1
10 TABLET ORAL DAILY
Qty: 30 TABLET | Refills: 0 | Status: SHIPPED | OUTPATIENT
Start: 2022-11-09 | End: 2022-11-09

## 2022-11-09 RX ORDER — DEXTROAMPHETAMINE SACCHARATE, AMPHETAMINE ASPARTATE, DEXTROAMPHETAMINE SULFATE AND AMPHETAMINE SULFATE 2.5; 2.5; 2.5; 2.5 MG/1; MG/1; MG/1; MG/1
10 TABLET ORAL DAILY
Qty: 30 TABLET | Refills: 0 | Status: SHIPPED | OUTPATIENT
Start: 2022-12-09 | End: 2023-06-23

## 2022-11-09 RX ORDER — ESCITALOPRAM OXALATE 10 MG/1
10 TABLET ORAL DAILY
Qty: 90 TABLET | Refills: 1 | Status: SHIPPED | OUTPATIENT
Start: 2022-11-09 | End: 2023-11-12

## 2022-11-09 ASSESSMENT — PATIENT HEALTH QUESTIONNAIRE - PHQ9: SUM OF ALL RESPONSES TO PHQ QUESTIONS 1-9: 10

## 2022-11-09 NOTE — PROGRESS NOTES
Alia is a 36 year old who is being evaluated via a billable video visit.      How would you like to obtain your AVS? MyChart  If the video visit is dropped, the invitation should be resent by: Text to cell phone: [phone]  Will anyone else be joining your video visit? No    Assessment & Plan     ICD-10-CM    1. Moderate episode of recurrent major depressive disorder (H)  F33.1 escitalopram (LEXAPRO) 10 MG tablet      2. SUKHWINDER (generalized anxiety disorder)  F41.1 buPROPion (WELLBUTRIN XL) 300 MG 24 hr tablet     hydrOXYzine (ATARAX) 25 MG tablet      3. Attention deficit hyperactivity disorder (ADHD), predominantly inattentive type  F90.0 amphetamine-dextroamphetamine (ADDERALL XR) 10 MG 24 hr capsule     amphetamine-dextroamphetamine (ADDERALL) 10 MG tablet        1 & 2. Mood   - Reports stable on current regimen  - Reviewed use and side effects, refilled     3. ADHD   - Diagnosed as an adult, been stable on Adderall for many years, but stopped for the last 2 years as stayed home with her children   - However, now went back to work and finding things very difficult   - Past regimen Adderall XR 10 mg in AM and 10 mg IR in afternoon   - Will restart, reviewed medication use and side effects  -  reviewed, no fills   - Discussed need for CSA and Utox   - Will give 2 months today to allow time to schedule in her primary clinic       Review of the result(s) of each unique test - See list   Diagnosis or treatment significantly limited by social determinants of health - None     20 minutes spent on the date of the encounter doing chart review, history and exam, documentation and further activities as noted above    The patient indicates understanding of these issues and agrees with the plan.    Follow up: 1-2 months in clinic     PA student as below was present and participated in shadow capacity only    SAMAN Cevallos-S2  [facility]     Lillian Borrero-SAMAN Hickman-C  [facility]  KALI Rojo is a 36 year old, presenting for the following health issues:  A.D.H.D and Depression      HPI   Depression Followup    How are you doing with your depression since your last visit? No change    Are you having other symptoms that might be associated with depression? Yes:  Trouble sleeping    Have you had a significant life event?  Grief or Loss     Are you feeling anxious or having panic attacks?   No    Do you have any concerns with your use of alcohol or other drugs? No    Would like to restart ADHD medication,taking adderall as prescribed, no,the medication was helping with the symtoms.     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  Last PHQ-9 11/9/2022   1.  Little interest or pleasure in doing things 1   2.  Feeling down, depressed, or hopeless 0   3.  Trouble falling or staying asleep, or sleeping too much 3   4.  Feeling tired or having little energy 3   5.  Poor appetite or overeating 0   6.  Feeling bad about yourself 0   7.  Trouble concentrating 3   8.  Moving slowly or restless 0   Q9: Thoughts of better off dead/self-harm past 2 weeks 0   PHQ-9 Total Score 10   Difficulty at work, home, or with people Somewhat difficult           How many servings of fruits and vegetables do you eat daily?   2-3    On average, how many sweetened beverages do you drink each day (Examples: soda, juice, sweet tea, etc.  Do NOT count diet or artificially sweetened beverages)?   1    How many days per week do you exercise enough to make your heart beat faster? 3 or less    How many minutes a day do you exercise enough to make your heart beat faster? 9 or less    How many days per week do you miss taking your medication? 0        Review of Systems   Constitutional, HEENT, cardiovascular, pulmonary, gi and gu systems are negative, except as otherwise noted.      Objective           Vitals:  No vitals were obtained today due to virtual visit.    Physical Exam   GENERAL: Healthy, alert and no distress  EYES: Eyes grossly normal to inspection.  No discharge or erythema, or obvious scleral/conjunctival abnormalities.  RESP: No audible wheeze, cough, or visible cyanosis.  No visible retractions or increased work of breathing.    SKIN: Visible skin clear. No significant rash, abnormal pigmentation or lesions.  NEURO: Cranial nerves grossly intact.  Mentation and speech appropriate for age.  PSYCH: Mentation appears normal, affect normal/bright, judgement and insight intact, normal speech and appearance well-groomed.    Diagnostics:           Video-Visit Details    Video Start Time: 10:27 AM    Type of service:  Video Visit    Video End Time:10:36 AM    Originating Location (pt. Location): Home    Distant Location (provider location):  Off-site    Platform used for Video Visit: [email or web address]

## 2023-01-03 DIAGNOSIS — K59.04 CHRONIC IDIOPATHIC CONSTIPATION: ICD-10-CM

## 2023-01-04 RX ORDER — POLYETHYLENE GLYCOL 3350 17 G/17G
1 POWDER, FOR SOLUTION ORAL DAILY
Qty: 1530 G | Refills: 0 | Status: SHIPPED | OUTPATIENT
Start: 2023-01-04 | End: 2023-12-01

## 2023-01-04 NOTE — TELEPHONE ENCOUNTER
"Last Written Prescription Date:  6/18/22  Last Fill Quantity: 510 g,  # refills: 4   Last office visit provider:  1/26/22     Requested Prescriptions   Pending Prescriptions Disp Refills     polyethylene glycol (MIRALAX) 17 GM/Dose powder [Pharmacy Med Name: Polyethylene Glycol 3350 Oral Powder 17 GM/SCOOP] 510 g 0     Sig: Take 17 g (1 capful) by mouth daily (as directed for constipation)       Laxatives Protocol Passed - 1/4/2023  8:38 AM        Passed - Patient is age 6 or older        Passed - Recent (12 mo) or future (30 days) visit within the authorizing provider's specialty     Patient has had an office visit with the authorizing provider or a provider within the authorizing providers department within the previous 12 mos or has a future within next 30 days. See \"Patient Info\" tab in inbasket, or \"Choose Columns\" in Meds & Orders section of the refill encounter.              Passed - Medication is active on med list             Nolan Lombardi RN 01/04/23 8:38 AM  "

## 2023-01-13 DIAGNOSIS — F41.1 GAD (GENERALIZED ANXIETY DISORDER): ICD-10-CM

## 2023-01-18 RX ORDER — HYDROXYZINE HYDROCHLORIDE 25 MG/1
TABLET, FILM COATED ORAL
Qty: 45 TABLET | Refills: 0 | Status: SHIPPED | OUTPATIENT
Start: 2023-01-18 | End: 2023-04-05

## 2023-02-13 ENCOUNTER — TRANSFERRED RECORDS (OUTPATIENT)
Dept: HEALTH INFORMATION MANAGEMENT | Facility: CLINIC | Age: 38
End: 2023-02-13
Payer: COMMERCIAL

## 2023-04-02 ENCOUNTER — E-VISIT (OUTPATIENT)
Dept: FAMILY MEDICINE | Facility: CLINIC | Age: 38
End: 2023-04-02
Payer: COMMERCIAL

## 2023-04-02 DIAGNOSIS — Z20.818 STREP THROAT EXPOSURE: ICD-10-CM

## 2023-04-02 DIAGNOSIS — J02.9 ACUTE SORE THROAT: ICD-10-CM

## 2023-04-02 PROCEDURE — 99421 OL DIG E/M SVC 5-10 MIN: CPT | Performed by: FAMILY MEDICINE

## 2023-04-03 RX ORDER — AMOXICILLIN 500 MG/1
500 CAPSULE ORAL 2 TIMES DAILY
Qty: 20 CAPSULE | Refills: 0 | Status: SHIPPED | OUTPATIENT
Start: 2023-04-03 | End: 2023-04-13

## 2023-04-03 NOTE — PATIENT INSTRUCTIONS
Thank you for choosing us for your care. I have placed an order for a prescription so that you can start treatment. View your full visit summary for details by clicking on the link below. Your pharmacist will able to address any questions you may have about the medication.     If you're not feeling better within 5-7 days, please schedule an appointment.  You can schedule an appointment right here in [facility], or call [phone]  If the visit is for the same symptoms as your eVisit, we'll refund the cost of your eVisit if seen within seven days.

## 2023-04-03 NOTE — TELEPHONE ENCOUNTER
Provider E-Visit time total (minutes): daughter with positive strep - entire family has symptoms - wants to be treated.   Will call in rx for patient.

## 2023-04-05 DIAGNOSIS — F41.1 GAD (GENERALIZED ANXIETY DISORDER): ICD-10-CM

## 2023-04-05 RX ORDER — HYDROXYZINE HYDROCHLORIDE 25 MG/1
TABLET, FILM COATED ORAL
Qty: 45 TABLET | Refills: 0 | Status: SHIPPED | OUTPATIENT
Start: 2023-04-05 | End: 2023-07-09

## 2023-04-12 ENCOUNTER — E-VISIT (OUTPATIENT)
Dept: FAMILY MEDICINE | Facility: CLINIC | Age: 38
End: 2023-04-12

## 2023-04-12 ENCOUNTER — E-VISIT (OUTPATIENT)
Dept: FAMILY MEDICINE | Facility: CLINIC | Age: 38
End: 2023-04-12
Payer: COMMERCIAL

## 2023-04-12 DIAGNOSIS — B37.31 YEAST VAGINITIS: Primary | ICD-10-CM

## 2023-04-12 PROCEDURE — 99421 OL DIG E/M SVC 5-10 MIN: CPT | Performed by: FAMILY MEDICINE

## 2023-04-12 PROCEDURE — 99207 PR NON-BILLABLE SERV PER CHARTING: CPT | Performed by: FAMILY MEDICINE

## 2023-04-12 RX ORDER — FLUCONAZOLE 150 MG/1
150 TABLET ORAL ONCE
Qty: 1 TABLET | Refills: 0 | Status: SHIPPED | OUTPATIENT
Start: 2023-04-12 | End: 2023-04-12

## 2023-04-12 NOTE — PATIENT INSTRUCTIONS
Thank you for choosing us for your care.    It should be okay to use externally, however, if the yeast are present externally, I suspect there is internal yeast as well.   I have placed an order for a prescription of oral (by mouth) Fluconazole.  This is one dose.  You could try that as well.   View your full visit summary for details by clicking on the link below. Your pharmacist will able to address any questions you may have about the medication.     If you're not feeling better within 5-7 days, please schedule an appointment.  You [place], or call [phone]  If the visit is for the same symptoms as your eVisit, we'll refund the cost of your eVisit if seen within seven days.

## 2023-04-13 ENCOUNTER — TELEPHONE (OUTPATIENT)
Dept: FAMILY MEDICINE | Facility: CLINIC | Age: 38
End: 2023-04-13
Payer: COMMERCIAL

## 2023-04-13 PROBLEM — K58.9 IRRITABLE BOWEL SYNDROME: Status: ACTIVE | Noted: 2023-04-13

## 2023-04-13 PROBLEM — K59.04 CHRONIC IDIOPATHIC CONSTIPATION: Status: ACTIVE | Noted: 2023-04-13

## 2023-04-13 NOTE — TELEPHONE ENCOUNTER
Provider E-Visit time total (minutes): 1  Patient had previous e-visit.  This is just a follow up message.

## 2023-06-04 ENCOUNTER — HEALTH MAINTENANCE LETTER (OUTPATIENT)
Age: 38
End: 2023-06-04

## 2023-06-23 ENCOUNTER — OFFICE VISIT (OUTPATIENT)
Dept: FAMILY MEDICINE | Facility: CLINIC | Age: 38
End: 2023-06-23
Payer: COMMERCIAL

## 2023-06-23 VITALS
DIASTOLIC BLOOD PRESSURE: 62 MMHG | OXYGEN SATURATION: 99 % | BODY MASS INDEX: 25.69 KG/M2 | TEMPERATURE: 98.1 F | RESPIRATION RATE: 18 BRPM | WEIGHT: 145 LBS | HEART RATE: 68 BPM | HEIGHT: 63 IN | SYSTOLIC BLOOD PRESSURE: 100 MMHG

## 2023-06-23 DIAGNOSIS — Z00.00 ADULT GENERAL MEDICAL EXAM: Primary | ICD-10-CM

## 2023-06-23 DIAGNOSIS — Z30.017 NEXPLANON INSERTION: ICD-10-CM

## 2023-06-23 DIAGNOSIS — F90.0 ATTENTION DEFICIT HYPERACTIVITY DISORDER (ADHD), PREDOMINANTLY INATTENTIVE TYPE: ICD-10-CM

## 2023-06-23 DIAGNOSIS — F41.1 GAD (GENERALIZED ANXIETY DISORDER): ICD-10-CM

## 2023-06-23 DIAGNOSIS — E55.9 VITAMIN D DEFICIENCY: ICD-10-CM

## 2023-06-23 DIAGNOSIS — F33.1 MODERATE EPISODE OF RECURRENT MAJOR DEPRESSIVE DISORDER (H): ICD-10-CM

## 2023-06-23 DIAGNOSIS — K59.04 CHRONIC IDIOPATHIC CONSTIPATION: ICD-10-CM

## 2023-06-23 LAB
ANION GAP SERPL CALCULATED.3IONS-SCNC: 13 MMOL/L (ref 7–15)
BUN SERPL-MCNC: 12.4 MG/DL (ref 6–20)
CALCIUM SERPL-MCNC: 9 MG/DL (ref 8.6–10)
CHLORIDE SERPL-SCNC: 107 MMOL/L (ref 98–107)
CHOLEST SERPL-MCNC: 184 MG/DL
CREAT SERPL-MCNC: 0.93 MG/DL (ref 0.51–0.95)
DEPRECATED CALCIDIOL+CALCIFEROL SERPL-MC: 30 UG/L (ref 20–75)
DEPRECATED HCO3 PLAS-SCNC: 22 MMOL/L (ref 22–29)
GFR SERPL CREATININE-BSD FRML MDRD: 80 ML/MIN/1.73M2
GLUCOSE SERPL-MCNC: 92 MG/DL (ref 70–99)
HDLC SERPL-MCNC: 58 MG/DL
HGB BLD-MCNC: 13.6 G/DL (ref 11.7–15.7)
LDLC SERPL CALC-MCNC: 108 MG/DL
NONHDLC SERPL-MCNC: 126 MG/DL
POTASSIUM SERPL-SCNC: 4.3 MMOL/L (ref 3.4–5.3)
SODIUM SERPL-SCNC: 142 MMOL/L (ref 136–145)
TRIGL SERPL-MCNC: 90 MG/DL
TSH SERPL DL<=0.005 MIU/L-ACNC: 1.96 UIU/ML (ref 0.3–4.2)

## 2023-06-23 PROCEDURE — 36415 COLL VENOUS BLD VENIPUNCTURE: CPT | Performed by: FAMILY MEDICINE

## 2023-06-23 PROCEDURE — 82306 VITAMIN D 25 HYDROXY: CPT | Performed by: FAMILY MEDICINE

## 2023-06-23 PROCEDURE — 85018 HEMOGLOBIN: CPT | Performed by: FAMILY MEDICINE

## 2023-06-23 PROCEDURE — 84443 ASSAY THYROID STIM HORMONE: CPT | Performed by: FAMILY MEDICINE

## 2023-06-23 PROCEDURE — 80061 LIPID PANEL: CPT | Performed by: FAMILY MEDICINE

## 2023-06-23 PROCEDURE — 80048 BASIC METABOLIC PNL TOTAL CA: CPT | Performed by: FAMILY MEDICINE

## 2023-06-23 PROCEDURE — 99395 PREV VISIT EST AGE 18-39: CPT | Performed by: FAMILY MEDICINE

## 2023-06-23 RX ORDER — LEVOCETIRIZINE DIHYDROCHLORIDE 5 MG/1
1 TABLET, FILM COATED ORAL DAILY PRN
COMMUNITY
Start: 2023-06-01

## 2023-06-23 RX ORDER — DEXTROAMPHETAMINE SACCHARATE, AMPHETAMINE ASPARTATE MONOHYDRATE, DEXTROAMPHETAMINE SULFATE AND AMPHETAMINE SULFATE 2.5; 2.5; 2.5; 2.5 MG/1; MG/1; MG/1; MG/1
10 CAPSULE, EXTENDED RELEASE ORAL EVERY MORNING
Qty: 30 CAPSULE | Refills: 0 | Status: SHIPPED | OUTPATIENT
Start: 2023-06-23 | End: 2024-07-19

## 2023-06-23 RX ORDER — FLUTICASONE PROPIONATE 50 MCG
2 SPRAY, SUSPENSION (ML) NASAL DAILY
COMMUNITY
Start: 2023-01-27

## 2023-06-23 RX ORDER — DEXTROAMPHETAMINE SACCHARATE, AMPHETAMINE ASPARTATE, DEXTROAMPHETAMINE SULFATE AND AMPHETAMINE SULFATE 2.5; 2.5; 2.5; 2.5 MG/1; MG/1; MG/1; MG/1
10 TABLET ORAL DAILY
Qty: 30 TABLET | Refills: 0 | Status: SHIPPED | OUTPATIENT
Start: 2023-06-23 | End: 2023-11-13

## 2023-06-23 RX ORDER — ETONOGESTREL 68 MG/1
1 IMPLANT SUBCUTANEOUS
COMMUNITY
End: 2024-07-19

## 2023-06-23 ASSESSMENT — ENCOUNTER SYMPTOMS
EYE PAIN: 0
DIARRHEA: 1
PALPITATIONS: 0
HEARTBURN: 0
CHILLS: 0
MYALGIAS: 1
BREAST MASS: 0
FEVER: 0
CONSTIPATION: 1
NAUSEA: 0
ABDOMINAL PAIN: 1
DIZZINESS: 0
HEADACHES: 0
SHORTNESS OF BREATH: 0
SORE THROAT: 0
WEAKNESS: 0
DYSURIA: 0
COUGH: 0
PARESTHESIAS: 0
HEMATOCHEZIA: 0
NERVOUS/ANXIOUS: 1
JOINT SWELLING: 0
ARTHRALGIAS: 0
HEMATURIA: 0
FREQUENCY: 0

## 2023-06-23 ASSESSMENT — PATIENT HEALTH QUESTIONNAIRE - PHQ9
SUM OF ALL RESPONSES TO PHQ QUESTIONS 1-9: 14
SUM OF ALL RESPONSES TO PHQ QUESTIONS 1-9: 14
10. IF YOU CHECKED OFF ANY PROBLEMS, HOW DIFFICULT HAVE THESE PROBLEMS MADE IT FOR YOU TO DO YOUR WORK, TAKE CARE OF THINGS AT HOME, OR GET ALONG WITH OTHER PEOPLE: EXTREMELY DIFFICULT
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
## 2023-07-07 DIAGNOSIS — F41.1 GAD (GENERALIZED ANXIETY DISORDER): ICD-10-CM

## 2023-07-09 RX ORDER — HYDROXYZINE HYDROCHLORIDE 25 MG/1
TABLET, FILM COATED ORAL
Qty: 45 TABLET | Refills: 0 | Status: SHIPPED | OUTPATIENT
Start: 2023-07-09 | End: 2023-08-30

## 2023-07-13 ENCOUNTER — TELEPHONE (OUTPATIENT)
Dept: FAMILY MEDICINE | Facility: CLINIC | Age: 38
End: 2023-07-13
Payer: COMMERCIAL

## 2023-07-13 NOTE — TELEPHONE ENCOUNTER
Rimma from [facility] pharmacy called and asked if  wanted to change the dosage of the Adderall XR 10 mg capsules because they aren't able to get that in often. Please advise

## 2023-07-13 NOTE — TELEPHONE ENCOUNTER
Medication Question or Refill    Contacts       Type Contact Phone/Fax    07/13/2023 09:57 AM CDT Phone (Incoming) [id number] - [place], [place] - [address] [place] [address] (Pharmacy) [phone]          What medication are you calling about (include dose and sig)?: amphetamine-dextroamphetamine (ADDERALL XR) 10 MG 24 hr capsule    Preferred Pharmacy:  [id number] - [place], [place] - [address] [place] [address]  [address] [place] [address]  Phone: [phone] Fax: [phone]      Controlled Substance Agreement on file:   CSA -- Patient Level:    CSA: None found at the patient level.       Who prescribed the medication?: Shereen    Do you need a refill? No    When did you use the medication last? N/A    Patient offered an appointment? No    Do you have any questions or concerns?  Yes: Rimma from [facility] pharmacy called and asked if  wanted to change the dosage of the Adderall XR 10 mg capsules because they aren't able to get that in often. Please advise      Could we send this information to you in [facility] or would you prefer to receive a phone call?:   Patient would prefer a phone call   Okay to leave a detailed message?: Yes at Home number on file [phone] (home)

## 2023-07-14 NOTE — TELEPHONE ENCOUNTER
Contacted [facility] and talked with pharmacist, Candice and [facility] is suppose to receive a medication order today which includes Adderall XR 10 mg capsule.  [facility] will contact patient if able to fill.  If unable to fill, than patient will need to contact her health insurance of other medication options that are covered or contact other pharmacies to see if Adderall XR is available.    Also contacted patient with plan / message and she will contact [facility] later.  No further action needed.    Anastasia Guerrero RN  [facility]

## 2023-08-30 DIAGNOSIS — F41.1 GAD (GENERALIZED ANXIETY DISORDER): ICD-10-CM

## 2023-08-30 RX ORDER — HYDROXYZINE HYDROCHLORIDE 25 MG/1
TABLET, FILM COATED ORAL
Qty: 45 TABLET | Refills: 0 | Status: SHIPPED | OUTPATIENT
Start: 2023-08-30 | End: 2023-11-13

## 2023-11-10 ENCOUNTER — TELEPHONE (OUTPATIENT)
Dept: PHARMACY | Facility: CLINIC | Age: 38
End: 2023-11-10
Payer: COMMERCIAL

## 2023-11-10 DIAGNOSIS — F41.1 GAD (GENERALIZED ANXIETY DISORDER): ICD-10-CM

## 2023-11-10 DIAGNOSIS — F90.0 ATTENTION DEFICIT HYPERACTIVITY DISORDER (ADHD), PREDOMINANTLY INATTENTIVE TYPE: ICD-10-CM

## 2023-11-10 DIAGNOSIS — F33.1 MODERATE EPISODE OF RECURRENT MAJOR DEPRESSIVE DISORDER (H): ICD-10-CM

## 2023-11-10 NOTE — TELEPHONE ENCOUNTER
MTM referral from: [facility]    MTM referral outreach attempt #2 on November 10, 2023 at 1:22 PM      Outcome: Left Message    Naomy Jaffe, Pharm.D.,[facility]  Board Certified Geriatric Pharmacist  Medication Therapy Management Pharmacist

## 2023-11-12 RX ORDER — ESCITALOPRAM OXALATE 10 MG/1
10 TABLET ORAL DAILY
Qty: 90 TABLET | Refills: 0 | Status: SHIPPED | OUTPATIENT
Start: 2023-11-12 | End: 2024-02-16

## 2023-11-12 RX ORDER — BUPROPION HYDROCHLORIDE 300 MG/1
300 TABLET ORAL DAILY
Qty: 90 TABLET | Refills: 0 | Status: SHIPPED | OUTPATIENT
Start: 2023-11-12 | End: 2024-07-11

## 2023-11-13 RX ORDER — DEXTROAMPHETAMINE SACCHARATE, AMPHETAMINE ASPARTATE, DEXTROAMPHETAMINE SULFATE AND AMPHETAMINE SULFATE 2.5; 2.5; 2.5; 2.5 MG/1; MG/1; MG/1; MG/1
10 TABLET ORAL DAILY
Qty: 30 TABLET | Refills: 0 | Status: SHIPPED | OUTPATIENT
Start: 2023-11-13 | End: 2024-07-08

## 2023-11-13 RX ORDER — HYDROXYZINE HYDROCHLORIDE 25 MG/1
TABLET, FILM COATED ORAL
Qty: 45 TABLET | Refills: 0 | Status: SHIPPED | OUTPATIENT
Start: 2023-11-13

## 2023-12-01 DIAGNOSIS — K59.04 CHRONIC IDIOPATHIC CONSTIPATION: ICD-10-CM

## 2023-12-01 RX ORDER — POLYETHYLENE GLYCOL 3350 17 G/17G
POWDER, FOR SOLUTION ORAL DAILY
Qty: 1530 G | Refills: 0 | Status: SHIPPED | OUTPATIENT
Start: 2023-12-01

## 2024-02-16 DIAGNOSIS — F41.1 GAD (GENERALIZED ANXIETY DISORDER): Primary | ICD-10-CM

## 2024-02-23 DIAGNOSIS — F41.1 GAD (GENERALIZED ANXIETY DISORDER): Primary | ICD-10-CM

## 2024-02-23 RX ORDER — BUSPIRONE HYDROCHLORIDE 5 MG/1
5 TABLET ORAL 3 TIMES DAILY PRN
Qty: 60 TABLET | Refills: 1 | Status: SHIPPED | OUTPATIENT
Start: 2024-02-23

## 2024-02-23 RX ORDER — ESCITALOPRAM OXALATE 20 MG/1
20 TABLET ORAL DAILY
Qty: 90 TABLET | Refills: 1 | Status: SHIPPED | OUTPATIENT
Start: 2024-02-23

## 2024-03-03 ENCOUNTER — HEALTH MAINTENANCE LETTER (OUTPATIENT)
Age: 39
End: 2024-03-03

## 2024-03-07 ENCOUNTER — TELEPHONE (OUTPATIENT)
Dept: FAMILY MEDICINE | Facility: CLINIC | Age: 39
End: 2024-03-07

## 2024-03-07 NOTE — TELEPHONE ENCOUNTER
New Medication Request    Contacts         Type Contact Phone/Fax    03/07/2024 08:35 AM CST Phone (Incoming) Alia Wilson (Self) [phone] (M)            What medication are you requesting?: Pink eye    Reason for medication request: Pink eye, two of her  kids have pink eye and she want to be prepare in case she has it too and pt is leaving to [place] on Monday and would like to have it handy.    Have you taken this medication before?: NA    Controlled Substance Agreement on file:   CSA -- Patient Level:    CSA: None found at the patient level.         Patient offered an appointment? No    Preferred Pharmacy:     [id number] [address] [place] [address]  Phone: [phone] Fax: [phone]      Could we send this information to you in [facility] or would you prefer to receive a phone call?:   Patient would prefer a phone call   Okay to leave a detailed message?: Yes at Home number on file [phone] (home)

## 2024-03-08 DIAGNOSIS — H10.33 ACUTE BACTERIAL CONJUNCTIVITIS OF BOTH EYES: Primary | ICD-10-CM

## 2024-03-08 RX ORDER — SULFACETAMIDE SODIUM 100 MG/ML
1-2 SOLUTION/ DROPS OPHTHALMIC
Qty: 5 ML | Refills: 0 | Status: SHIPPED | OUTPATIENT
Start: 2024-03-08 | End: 2024-07-19

## 2024-05-23 ENCOUNTER — OFFICE VISIT (OUTPATIENT)
Dept: FAMILY MEDICINE | Facility: CLINIC | Age: 39
End: 2024-05-23
Payer: COMMERCIAL

## 2024-05-23 ENCOUNTER — TELEPHONE (OUTPATIENT)
Dept: FAMILY MEDICINE | Facility: CLINIC | Age: 39
End: 2024-05-23

## 2024-05-23 VITALS
WEIGHT: 150 LBS | DIASTOLIC BLOOD PRESSURE: 78 MMHG | TEMPERATURE: 97.8 F | BODY MASS INDEX: 26.58 KG/M2 | HEIGHT: 63 IN | HEART RATE: 76 BPM | RESPIRATION RATE: 16 BRPM | SYSTOLIC BLOOD PRESSURE: 110 MMHG | OXYGEN SATURATION: 97 %

## 2024-05-23 DIAGNOSIS — Z30.46 SURVEILLANCE OF PREVIOUSLY PRESCRIBED IMPLANTABLE SUBDERMAL CONTRACEPTIVE: ICD-10-CM

## 2024-05-23 DIAGNOSIS — Z30.46 NEXPLANON REMOVAL: Primary | ICD-10-CM

## 2024-05-23 DIAGNOSIS — Z30.017 NEXPLANON INSERTION: ICD-10-CM

## 2024-05-23 LAB — HCG UR QL: NEGATIVE

## 2024-05-23 PROCEDURE — 81025 URINE PREGNANCY TEST: CPT | Performed by: PHYSICIAN ASSISTANT

## 2024-05-23 PROCEDURE — 11983 REMOVE/INSERT DRUG IMPLANT: CPT | Performed by: PHYSICIAN ASSISTANT

## 2024-05-23 RX ORDER — LIDOCAINE HYDROCHLORIDE AND EPINEPHRINE 10; 10 MG/ML; UG/ML
1 INJECTION, SOLUTION INFILTRATION; PERINEURAL ONCE
Status: COMPLETED | OUTPATIENT
Start: 2024-05-23 | End: 2024-05-23

## 2024-05-23 RX ADMIN — LIDOCAINE HYDROCHLORIDE AND EPINEPHRINE 1 ML: 10; 10 INJECTION, SOLUTION INFILTRATION; PERINEURAL at 11:57

## 2024-05-23 ASSESSMENT — ANXIETY QUESTIONNAIRES
6. BECOMING EASILY ANNOYED OR IRRITABLE: MORE THAN HALF THE DAYS
3. WORRYING TOO MUCH ABOUT DIFFERENT THINGS: NEARLY EVERY DAY
5. BEING SO RESTLESS THAT IT IS HARD TO SIT STILL: NEARLY EVERY DAY
2. NOT BEING ABLE TO STOP OR CONTROL WORRYING: MORE THAN HALF THE DAYS
GAD7 TOTAL SCORE: 17
7. FEELING AFRAID AS IF SOMETHING AWFUL MIGHT HAPPEN: SEVERAL DAYS
GAD7 TOTAL SCORE: 17
GAD7 TOTAL SCORE: 17
8. IF YOU CHECKED OFF ANY PROBLEMS, HOW DIFFICULT HAVE THESE MADE IT FOR YOU TO DO YOUR WORK, TAKE CARE OF THINGS AT HOME, OR GET ALONG WITH OTHER PEOPLE?: SOMEWHAT DIFFICULT
7. FEELING AFRAID AS IF SOMETHING AWFUL MIGHT HAPPEN: SEVERAL DAYS
1. FEELING NERVOUS, ANXIOUS, OR ON EDGE: NEARLY EVERY DAY
IF YOU CHECKED OFF ANY PROBLEMS ON THIS QUESTIONNAIRE, HOW DIFFICULT HAVE THESE PROBLEMS MADE IT FOR YOU TO DO YOUR WORK, TAKE CARE OF THINGS AT HOME, OR GET ALONG WITH OTHER PEOPLE: SOMEWHAT DIFFICULT
4. TROUBLE RELAXING: NEARLY EVERY DAY

## 2024-05-23 ASSESSMENT — PATIENT HEALTH QUESTIONNAIRE - PHQ9
10. IF YOU CHECKED OFF ANY PROBLEMS, HOW DIFFICULT HAVE THESE PROBLEMS MADE IT FOR YOU TO DO YOUR WORK, TAKE CARE OF THINGS AT HOME, OR GET ALONG WITH OTHER PEOPLE: VERY DIFFICULT
SUM OF ALL RESPONSES TO PHQ QUESTIONS 1-9: 16
SUM OF ALL RESPONSES TO PHQ QUESTIONS 1-9: 16

## 2024-05-23 NOTE — PROGRESS NOTES
Subjective:    Alia Wilson is a 38 year old female who presents for Nexplanon replacement.    Current Nexplanon  2024.  She would like old Nexplanon removed and new 1 placed.  She thinks it is possible she has had unprotected sex in the past 2 weeks.  We reviewed her pregnancy test was negative today.  I discussed with her that if she were to be less than 2 weeks pregnant, it would be too early for our pregnancy test to be positive.  I reviewed with patient that replacing Nexplanon, if she were early in pregnancy, should not adversely affect pregnancy.  Patient would like Nexplanon replaced today.  She will then take home or clinic pregnancy test in 2 weeks if she has any concerns.  All of her questions were answered.    She would like to proceed.    Of note, patient scored high on her PHQ-9 today.  We discussed this.  She notes she has been following closely with her PCP Dr. Regan.  They made medication adjustments a few weeks ago.  Patient notes she has a lot of stress right now given end of the school year and other family obligations.  She feels like she is stable.  She declines further intervention at this time.    Patient Active Problem List   Diagnosis    Abnormal Papanicolaou smear of cervix with positive human papilloma virus (HPV) test    Allergies    Unspecified constipation    Whiplash injuries, initial encounter    Palpitations    Reactive depression    SUKHWINDER (generalized anxiety disorder)    Nexplanon insertion (21)    Moderate major depression (H)    Attention deficit hyperactivity disorder (ADHD), predominantly inattentive type    Irritable bowel syndrome    Chronic idiopathic constipation    Moderate episode of recurrent major depressive disorder (H)       Current Outpatient Medications:     aluminum chloride (DRYSOL) 20 % external solution, Apply topically At Bedtime, Disp: 60 mL, Rfl: 3    amphetamine-dextroamphetamine (ADDERALL XR) 10 MG 24 hr capsule, Take 1 capsule (10 mg)  "by mouth every morning, Disp: 30 capsule, Rfl: 0    amphetamine-dextroamphetamine (ADDERALL) 10 MG tablet, Take 1 tablet (10 mg) by mouth daily In the afternoon., Disp: 30 tablet, Rfl: 0    buPROPion (WELLBUTRIN XL) 300 MG 24 hr tablet, Take 1 tablet (300 mg) by mouth daily., Disp: 90 tablet, Rfl: 0    busPIRone (BUSPAR) 5 MG tablet, Take 1 tablet (5 mg) by mouth 3 times daily as needed (anxiety), Disp: 60 tablet, Rfl: 1    escitalopram (LEXAPRO) 20 MG tablet, Take 1 tablet (20 mg) by mouth daily, Disp: 90 tablet, Rfl: 1    etonogestrel (NEXPLANON) 68 MG IMPL, 1 each (68 mg) by Subdermal route once, Disp: , Rfl:     etonogestrel (NEXPLANON) 68 MG IMPL, Inject 1 Device Subcutaneous, Disp: , Rfl:     fluticasone (FLONASE) 50 MCG/ACT nasal spray, Spray 2 sprays in nostril daily, Disp: , Rfl:     hydrOXYzine (ATARAX) 25 MG tablet, Take 1 to 2 tablets by mouth 3 times daily as needed for anxiety or sleep., Disp: 45 tablet, Rfl: 0    levocetirizine (XYZAL) 5 MG tablet, Take 1 tablet by mouth daily as needed, Disp: , Rfl:     meclizine (ANTIVERT) 12.5 MG tablet, Take 1 tablet (12.5 mg) by mouth 4 times daily as needed for dizziness or nausea, Disp: 30 tablet, Rfl: 0    polyethylene glycol (MIRALAX) 17 GM/Dose powder, Take 17 g (1 capful) by mouth daily (as directed for constipation), Disp: 1530 g, Rfl: 0    sulfacetamide (BLEPH-10) 10 % ophthalmic solution, Apply 1-2 drops to eye every 2 hours (while awake) Use until clear, Disp: 5 mL, Rfl: 0  No current facility-administered medications for this visit.      Objective:   Allergies:  Percocet [oxycodone-acetaminophen] and Dilaudid [hydromorphone]    /78 (BP Location: Left arm, Patient Position: Sitting, Cuff Size: Adult Regular)   Pulse 76   Temp 97.8  F (36.6  C) (Temporal)   Resp 16   Ht 1.6 m (5' 3\")   Wt 68 kg (150 lb)   LMP  (LMP Unknown)   SpO2 97%   BMI 26.57 kg/m    Body mass index is 26.57 kg/m .    General: Alert and oriented x 3, in no apparent " distress    Procedure:  Left upper inner arm was adequately anesthetized with 2.5 cc of lidocaine with Epi.  Then, using sterile technique, 5 mm incision was made with an 11 blade.  Nexplanon was palpated subcutaneously and removed with a hemostat clamp.  Then, using sterile technique, new Nexplanon was inserted and adelina was deployed without difficulty.  New Nexplanon adelina was palpable subcutaneously by myself.  Incision site was closed with steri-strips and wrapped with a pressure bandage.  Patient was neurovascularly intact after exam.  Appropriate wound aftercare was dicussed with patient.      Results for orders placed or performed in visit on 05/23/24   HCG qualitative urine     Status: Normal   Result Value Ref Range    hCG Urine Qualitative Negative Negative          Assessment and Plan:     1. Old Nexplanon removal  Old Nexplanon removed today.  - lidocaine 1% with EPINEPHrine 1:100,000 injection 1 mL  - etonogestrel (NEXPLANON) subdermal implant 68 mg  - REMOVAL AND REINSERTION NEXPLANON  - HCG qualitative urine    2. New Nexplanon insertion  Insertion Date: 05/23/24  3 Year Expiration Date: 05/23/27  Consent form was reviewed with patient, signed, and will be scanned in to her chart.  She knows to use back-up birth control for the next 1 week.   - lidocaine 1% with EPINEPHrine 1:100,000 injection 1 mL  - etonogestrel (NEXPLANON) subdermal implant 68 mg  - REMOVAL AND REINSERTION NEXPLANON  - HCG qualitative urine        This dictation uses voice recognition software, which may contain typographical errors.

## 2024-05-23 NOTE — TELEPHONE ENCOUNTER
General Call      Reason for Call:  mail out nexplanon card per April Alejandre. Writer mailed out nexplanon card on 5/23/2024.     Date of last appointment with provider: 5/23/2024    Could we send this information to you in [employer] or would you prefer to receive a phone call?:   Patient would prefer a phone call   Okay to leave a detailed message?: Yes at Cell number on file:    Telephone Information:   Mobile [phone]

## 2024-05-24 ENCOUNTER — PATIENT OUTREACH (OUTPATIENT)
Dept: CARE COORDINATION | Facility: CLINIC | Age: 39
End: 2024-05-24
Payer: COMMERCIAL

## 2024-06-07 ENCOUNTER — PATIENT OUTREACH (OUTPATIENT)
Dept: CARE COORDINATION | Facility: CLINIC | Age: 39
End: 2024-06-07
Payer: COMMERCIAL

## 2024-07-01 ENCOUNTER — MYC MEDICAL ADVICE (OUTPATIENT)
Dept: FAMILY MEDICINE | Facility: CLINIC | Age: 39
End: 2024-07-01
Payer: COMMERCIAL

## 2024-07-01 DIAGNOSIS — J02.9 SORE THROAT: Primary | ICD-10-CM

## 2024-07-01 DIAGNOSIS — Z20.818 STREP THROAT EXPOSURE: ICD-10-CM

## 2024-07-01 RX ORDER — AMOXICILLIN 500 MG/1
500 CAPSULE ORAL 2 TIMES DAILY
Qty: 20 CAPSULE | Refills: 0 | Status: SHIPPED | OUTPATIENT
Start: 2024-07-01 | End: 2024-07-11

## 2024-07-01 NOTE — TELEPHONE ENCOUNTER
S-(situation):     Last week one of my day at 2 of my girls came down with strep. Earlier today [id number] my throat started hurting. Now my throat hurts very much and I was wondering if I could get some antibiotics or do I need to be seen first?     B-(background):     Patient was last seen on 5/23/24    A-(assessment):   Sore throat started on 6/30/24 and getting worse since last night  Left swollen tonsil  Left side of throat white  Normal breathing and swallowing  2 daughter + for strep    R-(recommendations):     Message routed to PCP, Dr Regan for possible treatment, lab orders or if patient needs to be seen    Anastasia Guerrero RN  [facility]

## 2024-07-01 NOTE — TELEPHONE ENCOUNTER
Contacted patient with message below for antibiotic for possible strep throat.  No further action needed.    Anastasia Guerrero RN  [facility]    Dr Regan  I'll send in a prescription.  Check with your pharmacy.

## 2024-07-08 ENCOUNTER — MYC REFILL (OUTPATIENT)
Dept: FAMILY MEDICINE | Facility: CLINIC | Age: 39
End: 2024-07-08
Payer: COMMERCIAL

## 2024-07-08 DIAGNOSIS — F90.0 ATTENTION DEFICIT HYPERACTIVITY DISORDER (ADHD), PREDOMINANTLY INATTENTIVE TYPE: ICD-10-CM

## 2024-07-08 RX ORDER — DEXTROAMPHETAMINE SACCHARATE, AMPHETAMINE ASPARTATE, DEXTROAMPHETAMINE SULFATE AND AMPHETAMINE SULFATE 2.5; 2.5; 2.5; 2.5 MG/1; MG/1; MG/1; MG/1
10 TABLET ORAL DAILY
Qty: 30 TABLET | Refills: 0 | Status: SHIPPED | OUTPATIENT
Start: 2024-07-08 | End: 2024-08-21

## 2024-07-19 ENCOUNTER — OFFICE VISIT (OUTPATIENT)
Dept: FAMILY MEDICINE | Facility: CLINIC | Age: 39
End: 2024-07-19
Payer: COMMERCIAL

## 2024-07-19 VITALS
RESPIRATION RATE: 16 BRPM | HEIGHT: 61 IN | BODY MASS INDEX: 28.13 KG/M2 | TEMPERATURE: 97.7 F | WEIGHT: 149 LBS | DIASTOLIC BLOOD PRESSURE: 51 MMHG | HEART RATE: 71 BPM | SYSTOLIC BLOOD PRESSURE: 96 MMHG | OXYGEN SATURATION: 97 %

## 2024-07-19 DIAGNOSIS — Z12.4 SCREENING FOR MALIGNANT NEOPLASM OF CERVIX: ICD-10-CM

## 2024-07-19 DIAGNOSIS — Z00.00 ADULT GENERAL MEDICAL EXAM: Primary | ICD-10-CM

## 2024-07-19 DIAGNOSIS — Z12.31 ENCOUNTER FOR SCREENING MAMMOGRAM FOR BREAST CANCER: ICD-10-CM

## 2024-07-19 DIAGNOSIS — Z13.220 LIPID SCREENING: ICD-10-CM

## 2024-07-19 DIAGNOSIS — R10.9 ABDOMINAL CRAMPING: ICD-10-CM

## 2024-07-19 DIAGNOSIS — F33.1 MODERATE EPISODE OF RECURRENT MAJOR DEPRESSIVE DISORDER (H): ICD-10-CM

## 2024-07-19 DIAGNOSIS — K59.04 CHRONIC IDIOPATHIC CONSTIPATION: ICD-10-CM

## 2024-07-19 LAB
ALBUMIN SERPL BCG-MCNC: 4.3 G/DL (ref 3.5–5.2)
ALP SERPL-CCNC: 74 U/L (ref 40–150)
ALT SERPL W P-5'-P-CCNC: 9 U/L (ref 0–50)
ANION GAP SERPL CALCULATED.3IONS-SCNC: 11 MMOL/L (ref 7–15)
AST SERPL W P-5'-P-CCNC: 22 U/L (ref 0–45)
BILIRUB SERPL-MCNC: 0.6 MG/DL
BUN SERPL-MCNC: 15.2 MG/DL (ref 6–20)
CALCIUM SERPL-MCNC: 9 MG/DL (ref 8.8–10.4)
CHLORIDE SERPL-SCNC: 103 MMOL/L (ref 98–107)
CHOLEST SERPL-MCNC: 187 MG/DL
CREAT SERPL-MCNC: 0.97 MG/DL (ref 0.51–0.95)
EGFRCR SERPLBLD CKD-EPI 2021: 75 ML/MIN/1.73M2
ERYTHROCYTE [DISTWIDTH] IN BLOOD BY AUTOMATED COUNT: 12.9 % (ref 10–15)
FASTING STATUS PATIENT QL REPORTED: NO
FASTING STATUS PATIENT QL REPORTED: NO
GLUCOSE SERPL-MCNC: 94 MG/DL (ref 70–99)
HCO3 SERPL-SCNC: 25 MMOL/L (ref 22–29)
HCT VFR BLD AUTO: 42.5 % (ref 35–47)
HDLC SERPL-MCNC: 57 MG/DL
HGB BLD-MCNC: 14 G/DL (ref 11.7–15.7)
LDLC SERPL CALC-MCNC: 115 MG/DL
MCH RBC QN AUTO: 29.7 PG (ref 26.5–33)
MCHC RBC AUTO-ENTMCNC: 32.9 G/DL (ref 31.5–36.5)
MCV RBC AUTO: 90 FL (ref 78–100)
NONHDLC SERPL-MCNC: 130 MG/DL
PLATELET # BLD AUTO: 458 10E3/UL (ref 150–450)
POTASSIUM SERPL-SCNC: 4.2 MMOL/L (ref 3.4–5.3)
PROT SERPL-MCNC: 7.1 G/DL (ref 6.4–8.3)
RBC # BLD AUTO: 4.72 10E6/UL (ref 3.8–5.2)
SODIUM SERPL-SCNC: 139 MMOL/L (ref 135–145)
TRIGL SERPL-MCNC: 77 MG/DL
TSH SERPL DL<=0.005 MIU/L-ACNC: 1.53 UIU/ML (ref 0.3–4.2)
WBC # BLD AUTO: 7.1 10E3/UL (ref 4–11)

## 2024-07-19 PROCEDURE — 99396 PREV VISIT EST AGE 40-64: CPT | Performed by: FAMILY MEDICINE

## 2024-07-19 PROCEDURE — 84443 ASSAY THYROID STIM HORMONE: CPT | Performed by: FAMILY MEDICINE

## 2024-07-19 PROCEDURE — 87624 HPV HI-RISK TYP POOLED RSLT: CPT | Performed by: FAMILY MEDICINE

## 2024-07-19 PROCEDURE — G0145 SCR C/V CYTO,THINLAYER,RESCR: HCPCS | Performed by: FAMILY MEDICINE

## 2024-07-19 PROCEDURE — 36415 COLL VENOUS BLD VENIPUNCTURE: CPT | Performed by: FAMILY MEDICINE

## 2024-07-19 PROCEDURE — 80061 LIPID PANEL: CPT | Performed by: FAMILY MEDICINE

## 2024-07-19 PROCEDURE — 85027 COMPLETE CBC AUTOMATED: CPT | Performed by: FAMILY MEDICINE

## 2024-07-19 PROCEDURE — 80053 COMPREHEN METABOLIC PANEL: CPT | Performed by: FAMILY MEDICINE

## 2024-07-19 SDOH — HEALTH STABILITY: PHYSICAL HEALTH: ON AVERAGE, HOW MANY DAYS PER WEEK DO YOU ENGAGE IN MODERATE TO STRENUOUS EXERCISE (LIKE A BRISK WALK)?: 2 DAYS

## 2024-07-19 SDOH — HEALTH STABILITY: PHYSICAL HEALTH: ON AVERAGE, HOW MANY MINUTES DO YOU ENGAGE IN EXERCISE AT THIS LEVEL?: 20 MIN

## 2024-07-19 ASSESSMENT — SOCIAL DETERMINANTS OF HEALTH (SDOH): HOW OFTEN DO YOU GET TOGETHER WITH FRIENDS OR RELATIVES?: PATIENT DECLINED

## 2024-07-19 NOTE — PROGRESS NOTES
Preventive Care Visit  [facility]  MIKE DUFFY MD REBECCA, Family Medicine  Jul 19, 2024      1. Adult general medical exam  This is a 39 yo female here for physical exam     2. Abdominal cramping  Patient has abdominal cramping/constipation/irregular stools - will check labs - but will also refer to GI for further workup  - Adult GI  Referral - Consult Only; Future  - TSH with free T4 reflex; Future  - Comprehensive metabolic panel (BMP + Alb, Alk Phos, ALT, AST, Total. Bili, TP); Future  - CBC with platelets; Future  - TSH with free T4 reflex  - Comprehensive metabolic panel (BMP + Alb, Alk Phos, ALT, AST, Total. Bili, TP)  - CBC with platelets    3. Chronic idiopathic constipation  Patient complains of constipation/irregular bowel movements - Miralax works for her, but she doesn't take it often due to distaste for it - now wants to explore different options.  Will refer to GI.    - Adult GI  Referral - Consult Only; Future    4. Moderate episode of recurrent major depressive disorder (H)  Moderate depression - stable      6/23/2023     6:55 AM 5/23/2024    10:44 AM 7/17/2024     7:34 AM   PHQ   PHQ-9 Total Score 14 16 15   Q9: Thoughts of better off dead/self-harm past 2 weeks Not at all Several days Several days   F/U: Thoughts of suicide or self-harm  Yes No   F/U: Self harm-plan  No    F/U: Self-harm action  No    F/U: Safety concerns  No No         5. Screening for malignant neoplasm of cervix  Due for cervix cancer screening - pap/HPV done/sent.   - Pap Screen with HPV - Recommended Age 30 - 65 Years    6. Lipid screening  Lipid screening due - ordered   - Lipid Profile (Chol, Trig, HDL, LDL calc); Future  - Lipid Profile (Chol, Trig, HDL, LDL calc)    7. Encounter for screening mammogram for breast cancer  Patient is due for breast cancer screening - discussed - will refer for mammogram   - MA Screen Bilateral w/Tony; Future      Sabas Rojo is a  "38 year old, presenting for the following:  Physical (Pt is here today for physical and/would like to discuss her stomach issues)        7/19/2024     7:05 AM   Additional Questions   Roomed by Yoselyn   Accompanied by self        Health Care Directive  Patient does not have a Health Care Directive or Living Will: no written documents     Has Miralax - hates taking it   Forgets to take it -   Stomach issues continue -     Anytime she has to go to bathroom, has terrible cramping, soreness -   Like contractions - front and back   Can feel stool moving along the path - as stool moves, the pain goes with it  Once passes stool , feels fine    Symptoms are there despite consistency of the stool  Pain comes in middle of night - then has to get to bathroom - up to 3-4 times - then done  Already can't sleep -   No fam history of inflammatory bowel disease  Since she was little - \"took a break\" for a while  Last 10 years has been worse     Anxiety :  got new medication a few months ago   Was waking up groggy     Tried to change schedule a little to see if it helps  Randomly wakes up at 3 am  If can't go back to sleep, will make coffee and stay up   Going to bed earlier - now at \"normal bedtime\" - at 10 pm    Doesn't use extended release Adderall - doesn't think she needs both XR and short acting             7/19/2024   General Health   How would you rate your overall physical health? Good   Feel stress (tense, anxious, or unable to sleep) Rather much      (!) STRESS CONCERN      7/19/2024   Nutrition   Three or more servings of calcium each day? Yes   Diet: Regular (no restrictions)   How many servings of fruit and vegetables per day? (!) 0-1   How many sweetened beverages each day? 0-1            7/19/2024   Exercise   Days per week of moderate/strenous exercise 2 days   Average minutes spent exercising at this level 20 min      (!) EXERCISE CONCERN      7/19/2024   Social Factors   Frequency of gathering with friends or " relatives Patient declined   Worry food won't last until get money to buy more No   Food not last or not have enough money for food? No   Do you have housing? (Housing is defined as stable permanent housing and does not include staying ouside in a car, in a tent, in an abandoned building, in an overnight shelter, or couch-surfing.) Yes   Are you worried about losing your housing? No   Lack of transportation? No   Unable to get utilities (heat,electricity)? No            7/19/2024   Dental   Dentist two times every year? (!) NO            7/17/2024   TB Screening   Were you born outside of the [place]? No            Today's PHQ-9 Score:       7/17/2024     7:34 AM   PHQ-9 SCORE   PHQ-9 Total Score MyChart 15 (Moderately severe depression)   PHQ-9 Total Score 15           7/19/2024   Substance Use   Alcohol more than 3/day or more than 7/wk No   Do you use any other substances recreationally? No        Social History     Tobacco Use    Smoking status: Former    Smokeless tobacco: Never    Tobacco comments:     no smoke exposure   Vaping Use    Vaping status: Never Used   Substance Use Topics    Alcohol use: No    Drug use: No           6/23/2023   LAST FHS-7 RESULTS   1st degree relative breast or ovarian cancer Yes   Any relative bilateral breast cancer Yes           Mammogram Screening - Mammogram every 1-2 years updated in Health Maintenance based on mutual decision making        7/19/2024   STI Screening   New sexual partner(s) since last STI/HIV test? No        History of abnormal Pap smear: No - age 30- 64 PAP with HPV every 5 years recommended        Latest Ref Rng & Units 5/20/2019    11:48 AM 11/27/2017    12:19 PM 11/25/2014     5:12 PM   PAP / HPV   PAP Negative for squamous intraepithelial lesion or malignancy. Negative for squamous intraepithelial lesion or malignancy  Electronically signed by Aleena Salazar CT (ASCP) on 5/24/2019 at  3:22 PM    Negative for squamous intraepithelial lesion or  malignancy  Electronically signed by Patricia Perez CT (ASCP) on 2017 at  4:00 PM    Negative for squamous intraepithelial lesion or malignancy  Electronically signed by Aleena Salazar CT (ASCP) on 2014 at  2:15 PM      HPV 16 DNA NEG Negative      HPV 18 DNA NEG Negative      Other HR HPV NEG Negative        ASCVD Risk   The 10-year ASCVD risk score (Dalton WHITE, et al., 2019) is: 0.3%    Values used to calculate the score:      Age: 38 years      Sex: Female      Is Non- : No      Diabetic: No      Tobacco smoker: No      Systolic Blood Pressure: 96 mmHg      Is BP treated: No      HDL Cholesterol: 57 mg/dL      Total Cholesterol: 187 mg/dL        2024   Contraception/Family Planning   Questions about contraception or family planning No           Reviewed and updated as needed this visit by Provider                    Past Medical History:   Diagnosis Date    Placenta previa 2015    Pregnancy 2015     Past Surgical History:   Procedure Laterality Date    BIOPSY CERVICAL, LOCAL EXCISION, SINGLE/MULTIPLE      CONIZATION CERVIX,KNIFE/LASER      Description: Cervical Conization;  Recorded: 2008;    CONIZATION CERVIX,KNIFE/LASER      Description: Cervical Conization;  Proc Date: 2008;     OB History    Para Term  AB Living   3 3 2 1 0 1   SAB IAB Ectopic Multiple Live Births   0 0 0 0 1      # Outcome Date GA Lbr Jer/2nd Weight Sex Type Anes PTL Lv   3  04/16/15 32w2d  1.644 kg (3 lb 10 oz) M CS-LTranv EPI Y EVELIO      Birth Comments: Premature birth due to complete placenta previa - bleeding      Complications: Placenta Previa      Name: MAXWELL DELGADO      Apgar1: 8  Apgar5: 8   2 Term            1 Term              BP Readings from Last 3 Encounters:   24 96/51   24 110/78   23 100/62    Wt Readings from Last 3 Encounters:   24 67.6 kg (149 lb)   24 68 kg (150 lb)   23 65.8 kg  (145 lb)                  Patient Active Problem List   Diagnosis    Abnormal Papanicolaou smear of cervix with positive human papilloma virus (HPV) test    Allergies    Unspecified constipation    Whiplash injuries, initial encounter    Palpitations    Reactive depression    SUKHWINDER (generalized anxiety disorder)    Nexplanon insertion (5/23/24)    Moderate major depression (H)    Attention deficit hyperactivity disorder (ADHD), predominantly inattentive type    Irritable bowel syndrome    Chronic idiopathic constipation    Moderate episode of recurrent major depressive disorder (H)     Past Surgical History:   Procedure Laterality Date    BIOPSY CERVICAL, LOCAL EXCISION, SINGLE/MULTIPLE      CONIZATION CERVIX,KNIFE/LASER      Description: Cervical Conization;  Recorded: 07/30/2008;    CONIZATION CERVIX,KNIFE/LASER      Description: Cervical Conization;  Proc Date: 01/01/2008;       Social History     Tobacco Use    Smoking status: Former    Smokeless tobacco: Never    Tobacco comments:     no smoke exposure   Substance Use Topics    Alcohol use: No     Family History   Problem Relation Age of Onset    Chronic Obstructive Pulmonary Disease Mother         early    Heart Disease Mother         atypical chest pain    Hypertension Mother     Ovarian Cancer Maternal Grandmother     Breast Cancer Paternal Grandmother     Cervical Cancer Paternal Grandmother          Current Outpatient Medications   Medication Sig Dispense Refill    aluminum chloride (DRYSOL) 20 % external solution Apply topically At Bedtime 60 mL 3    amphetamine-dextroamphetamine (ADDERALL) 10 MG tablet Take 1 tablet (10 mg) by mouth daily In the afternoon 30 tablet 0    buPROPion (WELLBUTRIN XL) 300 MG 24 hr tablet TAKE 1 TABLET BY MOUTH ONCE DAILY. 90 tablet 0    busPIRone (BUSPAR) 5 MG tablet Take 1 tablet (5 mg) by mouth 3 times daily as needed (anxiety) 60 tablet 1    escitalopram (LEXAPRO) 20 MG tablet Take 1 tablet (20 mg) by mouth daily 90 tablet 1  "   etonogestrel (NEXPLANON) 68 MG IMPL 1 each (68 mg) by Subdermal route once      fluticasone (FLONASE) 50 MCG/ACT nasal spray Spray 2 sprays in nostril daily      hydrOXYzine (ATARAX) 25 MG tablet Take 1 to 2 tablets by mouth 3 times daily as needed for anxiety or sleep. 45 tablet 0    levocetirizine (XYZAL) 5 MG tablet Take 1 tablet by mouth daily as needed      polyethylene glycol (MIRALAX) 17 GM/Dose powder Take 17 g (1 capful) by mouth daily (as directed for constipation) 1530 g 0     Allergies   Allergen Reactions    Percocet [Oxycodone-Acetaminophen] Nausea and Vomiting    Dilaudid [Hydromorphone] Unknown    Codeine Rash     Recent Labs   Lab Test 07/19/24  0830 06/23/23  0751 12/01/21  0759 03/30/21  1741 05/20/19  1148   * 108*  --   --  101   HDL 57 58  --   --  51   TRIG 77 90  --   --  116   ALT 9  --   --  11 <9   CR 0.97* 0.93   < > 0.86 0.75   GFRESTIMATED 75 80   < > >60 >60   GFRESTBLACK  --   --   --  >60 >60   POTASSIUM 4.2 4.3   < > 4.4 4.3   TSH 1.53 1.96  --  1.37 0.81    < > = values in this interval not displayed.        Review of Systems   Constitutional:  Negative for chills and fever.   HENT:  Negative for ear pain and sore throat.    Eyes:  Negative for pain and visual disturbance.   Respiratory:  Negative for cough and shortness of breath.    Cardiovascular:  Negative for chest pain and palpitations.   Gastrointestinal:  Positive for constipation (irregular stooling with bloating/cramping). Negative for abdominal pain and vomiting.   Genitourinary:  Negative for dysuria and hematuria.   Musculoskeletal:  Negative for arthralgias and back pain.   Skin:  Negative for color change and rash.   Neurological:  Negative for seizures and syncope.   All other systems reviewed and are negative.         Objective    Exam  BP 96/51 (BP Location: Left arm, Patient Position: Sitting, Cuff Size: Adult Regular)   Pulse 71   Temp 97.7  F (36.5  C) (Temporal)   Resp 16   Ht 1.56 m (5' 1.42\")  " " Wt 67.6 kg (149 lb)   LMP  (LMP Unknown)   SpO2 97%   BMI 27.77 kg/m     Estimated body mass index is 27.77 kg/m  as calculated from the following:    Height as of this encounter: 1.56 m (5' 1.42\").    Weight as of this encounter: 67.6 kg (149 lb).    Physical Exam  Vitals reviewed.   Constitutional:       General: She is not in acute distress.     Appearance: Normal appearance.   HENT:      Head: Normocephalic.      Right Ear: Tympanic membrane, ear canal and external ear normal.      Left Ear: Tympanic membrane, ear canal and external ear normal.      Nose: Nose normal.      Mouth/Throat:      Mouth: Mucous membranes are moist.      Pharynx: No posterior oropharyngeal erythema.   Eyes:      Extraocular Movements: Extraocular movements intact.      Conjunctiva/sclera: Conjunctivae normal.      Pupils: Pupils are equal, round, and reactive to light.   Cardiovascular:      Rate and Rhythm: Normal rate and regular rhythm.      Pulses: Normal pulses.      Heart sounds: Normal heart sounds. No murmur heard.  Pulmonary:      Effort: Pulmonary effort is normal.      Breath sounds: Normal breath sounds.   Abdominal:      Palpations: Abdomen is soft. There is no mass.      Tenderness: There is no abdominal tenderness. There is no guarding or rebound.   Genitourinary:     Comments: PELVIC EXAM:External genitalia: normal  Vaginal mucosa normal  Vaginal discharge: negative  Speculum exam shows a normal appearing cervix .   Bimanual exam: Cervix closed, firm, non tender  to motion.  Musculoskeletal:         General: No deformity. Normal range of motion.      Cervical back: Normal range of motion and neck supple.   Lymphadenopathy:      Cervical: No cervical adenopathy.   Skin:     General: Skin is warm and dry.   Neurological:      General: No focal deficit present.      Mental Status: She is alert.   Psychiatric:         Mood and Affect: Mood normal.         Behavior: Behavior normal.               Signed Electronically " by: MIKE FERNANDEZ MD  Prior to immunization administration, verified patients identity using patient s name and date of birth. Please see Immunization Activity for additional information.     Screening Questionnaire for Adult Immunization    Are you sick today?   No   Do you have allergies to medications, food, a vaccine component or latex?   No   Have you ever had a serious reaction after receiving a vaccination?   No   Do you have a long-term health problem with heart, lung, kidney, or metabolic disease (e.g., diabetes), asthma, a blood disorder, no spleen, complement component deficiency, a cochlear implant, or a spinal fluid leak?  Are you on long-term aspirin therapy?   No   Do you have cancer, leukemia, HIV/AIDS, or any other immune system problem?   No   Do you have a parent, brother, or sister with an immune system problem?   No   In the past 3 months, have you taken medications that affect  your immune system, such as prednisone, other steroids, or anticancer drugs; drugs for the treatment of rheumatoid arthritis, Crohn s disease, or psoriasis; or have you had radiation treatments?   No   Have you had a seizure, or a brain or other nervous system problem?   No   During the past year, have you received a transfusion of blood or blood    products, or been given immune (gamma) globulin or antiviral drug?   No   For women: Are you pregnant or is there a chance you could become       pregnant during the next month?   No   Have you received any vaccinations in the past 4 weeks?   No     Immunization questionnaire answers were all negative.      Patient instructed to remain in clinic for 15 minutes afterwards, and to report any adverse reactions.     Screening performed by Yoselyn Barth MA on 7/19/2024 at 7:08 AM.

## 2024-07-21 ASSESSMENT — ENCOUNTER SYMPTOMS
CONSTIPATION: 1
COUGH: 0
SORE THROAT: 0
COLOR CHANGE: 0
DYSURIA: 0
ABDOMINAL PAIN: 0
BACK PAIN: 0
PALPITATIONS: 0
VOMITING: 0
SEIZURES: 0
EYE PAIN: 0
SHORTNESS OF BREATH: 0
FEVER: 0
ARTHRALGIAS: 0
CHILLS: 0
HEMATURIA: 0

## 2024-07-22 LAB
HPV HR 12 DNA CVX QL NAA+PROBE: NEGATIVE
HPV16 DNA CVX QL NAA+PROBE: NEGATIVE
HPV18 DNA CVX QL NAA+PROBE: NEGATIVE
HUMAN PAPILLOMA VIRUS FINAL DIAGNOSIS: NORMAL

## 2024-07-25 LAB
BKR LAB AP GYN ADEQUACY: NORMAL
BKR LAB AP GYN INTERPRETATION: NORMAL
BKR LAB AP PREVIOUS ABNORMAL: NORMAL
PATH REPORT.COMMENTS IMP SPEC: NORMAL
PATH REPORT.COMMENTS IMP SPEC: NORMAL
PATH REPORT.RELEVANT HX SPEC: NORMAL

## 2024-08-05 ENCOUNTER — TRANSFERRED RECORDS (OUTPATIENT)
Dept: HEALTH INFORMATION MANAGEMENT | Facility: CLINIC | Age: 39
End: 2024-08-05
Payer: COMMERCIAL

## 2024-08-21 DIAGNOSIS — F90.0 ATTENTION DEFICIT HYPERACTIVITY DISORDER (ADHD), PREDOMINANTLY INATTENTIVE TYPE: ICD-10-CM

## 2024-08-21 RX ORDER — DEXTROAMPHETAMINE SACCHARATE, AMPHETAMINE ASPARTATE, DEXTROAMPHETAMINE SULFATE AND AMPHETAMINE SULFATE 2.5; 2.5; 2.5; 2.5 MG/1; MG/1; MG/1; MG/1
10 TABLET ORAL DAILY
Qty: 30 TABLET | Refills: 0 | Status: SHIPPED | OUTPATIENT
Start: 2024-08-21

## 2024-09-13 ENCOUNTER — TRANSFERRED RECORDS (OUTPATIENT)
Dept: HEALTH INFORMATION MANAGEMENT | Facility: CLINIC | Age: 39
End: 2024-09-13
Payer: COMMERCIAL

## 2024-10-03 ENCOUNTER — APPOINTMENT (OUTPATIENT)
Dept: URBAN - METROPOLITAN AREA CLINIC 260 | Age: 39
Setting detail: DERMATOLOGY
End: 2024-10-03

## 2024-10-03 VITALS — WEIGHT: 147 LBS | HEIGHT: 62 IN

## 2024-10-03 DIAGNOSIS — D22 MELANOCYTIC NEVI: ICD-10-CM

## 2024-10-03 DIAGNOSIS — D49.2 NEOPLASM OF UNSPECIFIED BEHAVIOR OF BONE, SOFT TISSUE, AND SKIN: ICD-10-CM

## 2024-10-03 DIAGNOSIS — L82.1 OTHER SEBORRHEIC KERATOSIS: ICD-10-CM

## 2024-10-03 DIAGNOSIS — L81.4 OTHER MELANIN HYPERPIGMENTATION: ICD-10-CM

## 2024-10-03 DIAGNOSIS — Z71.89 OTHER SPECIFIED COUNSELING: ICD-10-CM

## 2024-10-03 DIAGNOSIS — D18.0 HEMANGIOMA: ICD-10-CM

## 2024-10-03 PROBLEM — D22.5 MELANOCYTIC NEVI OF TRUNK: Status: ACTIVE | Noted: 2024-10-03

## 2024-10-03 PROBLEM — D18.01 HEMANGIOMA OF SKIN AND SUBCUTANEOUS TISSUE: Status: ACTIVE | Noted: 2024-10-03

## 2024-10-03 PROCEDURE — OTHER BIOPSY BY SHAVE METHOD: OTHER

## 2024-10-03 PROCEDURE — OTHER PHOTO-DOCUMENTATION: OTHER

## 2024-10-03 PROCEDURE — OTHER COUNSELING: OTHER

## 2024-10-03 PROCEDURE — 99213 OFFICE O/P EST LOW 20 MIN: CPT | Mod: 25

## 2024-10-03 PROCEDURE — 11102 TANGNTL BX SKIN SINGLE LES: CPT | Mod: 59

## 2024-10-03 PROCEDURE — OTHER MIPS QUALITY: OTHER

## 2024-10-03 PROCEDURE — 56605 BIOPSY OF VULVA/PERINEUM: CPT

## 2024-10-03 ASSESSMENT — LOCATION DETAILED DESCRIPTION DERM
LOCATION DETAILED: INFERIOR THORACIC SPINE
LOCATION DETAILED: LEFT LABIUM MAJUS
LOCATION DETAILED: SUPERIOR LUMBAR SPINE
LOCATION DETAILED: LEFT MEDIAL SUPERIOR CHEST

## 2024-10-03 ASSESSMENT — LOCATION SIMPLE DESCRIPTION DERM
LOCATION SIMPLE: LOWER BACK
LOCATION SIMPLE: LABIA MAJORA
LOCATION SIMPLE: UPPER BACK
LOCATION SIMPLE: CHEST

## 2024-10-03 ASSESSMENT — LOCATION ZONE DERM
LOCATION ZONE: VULVA
LOCATION ZONE: TRUNK

## 2024-10-03 NOTE — PROCEDURE: BIOPSY BY SHAVE METHOD
Detail Level: Detailed
Depth Of Biopsy: dermis
Was A Bandage Applied: Yes
Size Of Lesion In Cm: 0
Biopsy Type: H and E
Biopsy Method: Dermablade
Anesthesia Type: 1% lidocaine with epinephrine
Anesthesia Volume In Cc: 0.5
Hemostasis: Drysol
Wound Care: Petrolatum
Dressing: bandage
Destruction After The Procedure: No
Type Of Destruction Used: Curettage
Curettage Text: The wound bed was treated with curettage after the biopsy was performed.
Cryotherapy Text: The wound bed was treated with cryotherapy after the biopsy was performed.
Electrodesiccation Text: The wound bed was treated with electrodesiccation after the biopsy was performed.
Electrodesiccation And Curettage Text: The wound bed was treated with electrodesiccation and curettage after the biopsy was performed.
Silver Nitrate Text: The wound bed was treated with silver nitrate after the biopsy was performed.
Lab: [id number]
Consent: Written consent was obtained and risks were reviewed including but not limited to scarring, infection, bleeding, scabbing, incomplete removal, nerve damage and allergy to anesthesia.
Post-Care Instructions: I reviewed with the patient in detail post-care instructions. Patient is to keep the biopsy site dry overnight, and then apply bacitracin twice daily until healed. Patient may apply hydrogen peroxide soaks to remove any crusting.
Notification Instructions: Patient will be notified of biopsy results. However, patient instructed to call the office if not contacted within 2 weeks.
Billing Type: Third-Party Bill
Information: Selecting Yes will display possible errors in your note based on the variables you have selected. This validation is only offered as a suggestion for you. PLEASE NOTE THAT THE VALIDATION TEXT WILL BE REMOVED WHEN YOU FINALIZE YOUR NOTE. IF YOU WANT TO FAX A PRELIMINARY NOTE YOU WILL NEED TO TOGGLE THIS TO 'NO' IF YOU DO NOT WANT IT IN YOUR FAXED NOTE.

## 2024-10-03 NOTE — HPI: FULL BODY SKIN EXAMINATION
What Type Of Note Output Would You Prefer (Optional)?: Standard Output
What Is The Reason For Today's Visit?: Full Body Skin Examination
What Is The Reason For Today's Visit? (Being Monitored For X): concerning skin lesions on an annual basis
Additional History: Patient presents with spots of concern on her bikini line and chest.

## 2024-10-07 ENCOUNTER — TRANSFERRED RECORDS (OUTPATIENT)
Dept: HEALTH INFORMATION MANAGEMENT | Facility: CLINIC | Age: 39
End: 2024-10-07
Payer: COMMERCIAL

## 2024-10-07 LAB — TSH SERPL-ACNC: 2.1 UIU/ML (ref 0.45–4.5)

## 2024-10-08 DIAGNOSIS — F90.0 ATTENTION DEFICIT HYPERACTIVITY DISORDER (ADHD), PREDOMINANTLY INATTENTIVE TYPE: ICD-10-CM

## 2024-10-08 RX ORDER — DEXTROAMPHETAMINE SACCHARATE, AMPHETAMINE ASPARTATE, DEXTROAMPHETAMINE SULFATE AND AMPHETAMINE SULFATE 2.5; 2.5; 2.5; 2.5 MG/1; MG/1; MG/1; MG/1
10 TABLET ORAL DAILY
Qty: 30 TABLET | Refills: 0 | Status: SHIPPED | OUTPATIENT
Start: 2024-10-08

## 2024-10-12 ENCOUNTER — NURSE TRIAGE (OUTPATIENT)
Dept: NURSING | Facility: CLINIC | Age: 39
End: 2024-10-12
Payer: COMMERCIAL

## 2024-10-12 NOTE — TELEPHONE ENCOUNTER
"Nurse Triage SBAR    Is this a 2nd Level Triage? NO    Situation: Pt calling with mole removal site healing concerns    Background: Pt had a mole removed in her groin area on 10/3/2024 at a dermatology clinic and it is not healing well. She had an additional mole removed that same day on her chest area and that is healing properly     Assessment: Due to the location of the groin mole removal, patient states that her underwear rub on it and it is \"always being touched by something\" and that it \"looks and feels like she just had it done\" while the other area is healed. It is tender and there is redness about 1/8th of an inch around the site. No fever. No puss or bloody drainage     Protocol Recommended Disposition:   See PCP Within 24 Hours    Recommendation: Care advice given. Pt might call the dermatology clinic for advisement. [place] [facility] [place]     Reason for Disposition   [1] Small red area or streak AND [2] no fever    Additional Information   Negative: [1] Major abdominal surgical incision AND [2] wound gaping open AND [3] visible internal organs   Negative: Sounds like a life-threatening emergency to the triager   Negative: Patient has a Negative Pressure Wound Therapy device   Negative: Patient is followed by a wound clinic or wound specialist for this wound   Negative: [1] Bleeding from incision AND [2] won't stop after 10 minutes of direct pressure   Negative: [1] Bleeding (more than a few drops) from incision AND [2] tracheostomy or blood vessel surgery (e.g., carotid endarterectomy, femoral bypass graft, kidney dialysis fistula)   Negative: [1] Widespread rash AND [2] bright red, sunburn-like   Negative: Severe pain in the incision   Negative: [1] Incision gaping open AND [2] < 48 hours since wound re-opened   Negative: [1] Incision gaping open AND [2] length of opening > 2 inches (5 cm)   Negative: Patient sounds very sick or weak to the triager   Negative: Sounds like a serious complication " to the triager   Negative: Fever > 100.4 F (38.0 C)   Negative: [1] Incision looks infected (spreading redness, pain) AND [2] fever > 99.5 F (37.5 C)   Negative: [1] Red streak runs from the incision AND [2] longer than 1 inch (2.5 cm)   Negative: [1] Pus or bad-smelling fluid draining from incision AND [2] no fever   Negative: [1] Incision looks infected (spreading redness, pain) AND [2] face wound   Negative: [1] Incision looks infected (spreading redness, pain) AND [2] large red area (> 2 in. or 5 cm)   Negative: [1] Post-op pain AND [2] not controlled with pain medications   Negative: Dressing soaked with blood or body fluid (e.g., drainage)   Negative: [1] Scant bleeding (e.g., few drops) from incision AND AND [2] tracheostomy or blood vessel surgery (e.g., carotid endarterectomy, femoral bypass graft, kidney dialysis fistula)   Negative: [1] Raised bruise and [2] size > 2 inches (5 cm) and expanding   Negative: [1] Caller has URGENT question AND [2] triager unable to answer question   Negative: [1] INCREASING pain in incision AND [2] > 2 days (48 hours) since surgery   Negative: [1] Incision gaping open AND [2] > 48 hours since wound re-opened AND [3] length of opening > 1/2 inch (12 mm)   Negative: [1] Clear or blood-tinged fluid draining from wound AND [2] no fever   Negative: [1] Incision on face gaping open AND [2] > 48 hours since wound re-opened AND [3] length of opening > 1/4 inch (6 mm)   Negative: Suture or staple removal is overdue   Negative: [1] Suture or staple came out early AND [2] caller wants wound checked   Negative: [1] Caller has NON-URGENT question AND [2] triager unable to answer question   Negative: Pimple where a stitch comes through the skin    Protocols used: Post-Op Incision Symptoms and [id number]  Sendy Felix RN   Triage Nurse Advisor on 10/12/2024 at 9:36 AM

## 2024-11-28 ENCOUNTER — APPOINTMENT (OUTPATIENT)
Dept: RADIOLOGY | Facility: HOSPITAL | Age: 39
End: 2024-11-28
Attending: UROLOGY
Payer: COMMERCIAL

## 2024-11-28 ENCOUNTER — ANESTHESIA EVENT (OUTPATIENT)
Dept: SURGERY | Facility: HOSPITAL | Age: 39
End: 2024-11-28
Payer: COMMERCIAL

## 2024-11-28 ENCOUNTER — APPOINTMENT (OUTPATIENT)
Dept: CT IMAGING | Facility: HOSPITAL | Age: 39
End: 2024-11-28
Attending: EMERGENCY MEDICINE
Payer: COMMERCIAL

## 2024-11-28 ENCOUNTER — HOSPITAL ENCOUNTER (OUTPATIENT)
Facility: HOSPITAL | Age: 39
Setting detail: OBSERVATION
End: 2024-11-28
Attending: EMERGENCY MEDICINE
Payer: COMMERCIAL

## 2024-11-28 ENCOUNTER — ANESTHESIA (OUTPATIENT)
Dept: SURGERY | Facility: HOSPITAL | Age: 39
End: 2024-11-28
Payer: COMMERCIAL

## 2024-11-28 VITALS
HEIGHT: 62 IN | HEART RATE: 84 BPM | RESPIRATION RATE: 16 BRPM | TEMPERATURE: 98.3 F | WEIGHT: 160.05 LBS | SYSTOLIC BLOOD PRESSURE: 107 MMHG | BODY MASS INDEX: 29.45 KG/M2 | OXYGEN SATURATION: 97 % | DIASTOLIC BLOOD PRESSURE: 69 MMHG

## 2024-11-28 DIAGNOSIS — R10.31 RLQ ABDOMINAL PAIN: ICD-10-CM

## 2024-11-28 DIAGNOSIS — N20.1 URETEROLITHIASIS: ICD-10-CM

## 2024-11-28 DIAGNOSIS — R11.0 NAUSEA: ICD-10-CM

## 2024-11-28 DIAGNOSIS — N20.0 RIGHT RENAL STONE: Primary | ICD-10-CM

## 2024-11-28 DIAGNOSIS — N30.01 ACUTE CYSTITIS WITH HEMATURIA: ICD-10-CM

## 2024-11-28 LAB
ALBUMIN UR-MCNC: NEGATIVE MG/DL
ANION GAP SERPL CALCULATED.3IONS-SCNC: 16 MMOL/L (ref 7–15)
APPEARANCE UR: ABNORMAL
BILIRUB UR QL STRIP: NEGATIVE
BUN SERPL-MCNC: 14.2 MG/DL (ref 6–20)
CALCIUM SERPL-MCNC: 9.2 MG/DL (ref 8.8–10.4)
CHLORIDE SERPL-SCNC: 104 MMOL/L (ref 98–107)
COLOR UR AUTO: ABNORMAL
CREAT SERPL-MCNC: 0.97 MG/DL (ref 0.51–0.95)
CRP SERPL-MCNC: <3 MG/L
EGFRCR SERPLBLD CKD-EPI 2021: 75 ML/MIN/1.73M2
ERYTHROCYTE [DISTWIDTH] IN BLOOD BY AUTOMATED COUNT: 12.8 % (ref 10–15)
GLUCOSE SERPL-MCNC: 171 MG/DL (ref 70–99)
GLUCOSE UR STRIP-MCNC: NEGATIVE MG/DL
HCG SERPL QL: NEGATIVE
HCO3 SERPL-SCNC: 19 MMOL/L (ref 22–29)
HCT VFR BLD AUTO: 40.8 % (ref 35–47)
HGB BLD-MCNC: 13.8 G/DL (ref 11.7–15.7)
HGB UR QL STRIP: ABNORMAL
HOLD SPECIMEN: NORMAL
KETONES UR STRIP-MCNC: ABNORMAL MG/DL
LEUKOCYTE ESTERASE UR QL STRIP: ABNORMAL
MCH RBC QN AUTO: 29.9 PG (ref 26.5–33)
MCHC RBC AUTO-ENTMCNC: 33.8 G/DL (ref 31.5–36.5)
MCV RBC AUTO: 89 FL (ref 78–100)
MUCOUS THREADS #/AREA URNS LPF: PRESENT /LPF
NITRATE UR QL: NEGATIVE
PH UR STRIP: 6.5 [PH] (ref 5–7)
PLATELET # BLD AUTO: 513 10E3/UL (ref 150–450)
POTASSIUM SERPL-SCNC: 4.2 MMOL/L (ref 3.4–5.3)
RBC # BLD AUTO: 4.61 10E6/UL (ref 3.8–5.2)
RBC URINE: >182 /HPF
SODIUM SERPL-SCNC: 139 MMOL/L (ref 135–145)
SP GR UR STRIP: 1.03 (ref 1–1.03)
SQUAMOUS EPITHELIAL: 4 /HPF
UROBILINOGEN UR STRIP-MCNC: <2 MG/DL
WBC # BLD AUTO: 12.8 10E3/UL (ref 4–11)
WBC URINE: 143 /HPF

## 2024-11-28 PROCEDURE — 99222 1ST HOSP IP/OBS MODERATE 55: CPT | Mod: 25 | Performed by: UROLOGY

## 2024-11-28 PROCEDURE — 82310 ASSAY OF CALCIUM: CPT | Performed by: EMERGENCY MEDICINE

## 2024-11-28 PROCEDURE — 250N000011 HC RX IP 250 OP 636: Performed by: EMERGENCY MEDICINE

## 2024-11-28 PROCEDURE — 82374 ASSAY BLOOD CARBON DIOXIDE: CPT | Performed by: EMERGENCY MEDICINE

## 2024-11-28 PROCEDURE — 250N000025 HC SEVOFLURANE, PER MIN: Performed by: UROLOGY

## 2024-11-28 PROCEDURE — 87086 URINE CULTURE/COLONY COUNT: CPT | Performed by: EMERGENCY MEDICINE

## 2024-11-28 PROCEDURE — 258N000003 HC RX IP 258 OP 636: Performed by: ANESTHESIOLOGY

## 2024-11-28 PROCEDURE — 36415 COLL VENOUS BLD VENIPUNCTURE: CPT | Performed by: EMERGENCY MEDICINE

## 2024-11-28 PROCEDURE — 999N000141 HC STATISTIC PRE-PROCEDURE NURSING ASSESSMENT: Performed by: UROLOGY

## 2024-11-28 PROCEDURE — 80048 BASIC METABOLIC PNL TOTAL CA: CPT | Performed by: EMERGENCY MEDICINE

## 2024-11-28 PROCEDURE — 99418 PROLNG IP/OBS E/M EA 15 MIN: CPT | Performed by: STUDENT IN AN ORGANIZED HEALTH CARE EDUCATION/TRAINING PROGRAM

## 2024-11-28 PROCEDURE — 74420 UROGRAPHY RTRGR +-KUB: CPT | Mod: 26 | Performed by: UROLOGY

## 2024-11-28 PROCEDURE — 250N000011 HC RX IP 250 OP 636: Performed by: UROLOGY

## 2024-11-28 PROCEDURE — 999N000180 XR SURGERY CARM FLUORO LESS THAN 5 MIN: Mod: TC

## 2024-11-28 PROCEDURE — 99285 EMERGENCY DEPT VISIT HI MDM: CPT | Mod: 25

## 2024-11-28 PROCEDURE — 360N000082 HC SURGERY LEVEL 2 W/ FLUORO, PER MIN: Performed by: UROLOGY

## 2024-11-28 PROCEDURE — 255N000002 HC RX 255 OP 636: Performed by: UROLOGY

## 2024-11-28 PROCEDURE — 85018 HEMOGLOBIN: CPT | Performed by: EMERGENCY MEDICINE

## 2024-11-28 PROCEDURE — 96375 TX/PRO/DX INJ NEW DRUG ADDON: CPT

## 2024-11-28 PROCEDURE — 250N000013 HC RX MED GY IP 250 OP 250 PS 637: Performed by: ANESTHESIOLOGY

## 2024-11-28 PROCEDURE — 81001 URINALYSIS AUTO W/SCOPE: CPT | Performed by: EMERGENCY MEDICINE

## 2024-11-28 PROCEDURE — 86140 C-REACTIVE PROTEIN: CPT | Performed by: EMERGENCY MEDICINE

## 2024-11-28 PROCEDURE — 250N000011 HC RX IP 250 OP 636: Performed by: REGISTERED NURSE

## 2024-11-28 PROCEDURE — 74177 CT ABD & PELVIS W/CONTRAST: CPT

## 2024-11-28 PROCEDURE — 84703 CHORIONIC GONADOTROPIN ASSAY: CPT | Performed by: EMERGENCY MEDICINE

## 2024-11-28 PROCEDURE — 85041 AUTOMATED RBC COUNT: CPT | Performed by: EMERGENCY MEDICINE

## 2024-11-28 PROCEDURE — 250N000009 HC RX 250: Performed by: ANESTHESIOLOGY

## 2024-11-28 PROCEDURE — 272N000001 HC OR GENERAL SUPPLY STERILE: Performed by: UROLOGY

## 2024-11-28 PROCEDURE — 99223 1ST HOSP IP/OBS HIGH 75: CPT | Performed by: STUDENT IN AN ORGANIZED HEALTH CARE EDUCATION/TRAINING PROGRAM

## 2024-11-28 PROCEDURE — C2617 STENT, NON-COR, TEM W/O DEL: HCPCS | Performed by: UROLOGY

## 2024-11-28 PROCEDURE — 52332 CYSTOSCOPY AND TREATMENT: CPT | Mod: RT | Performed by: UROLOGY

## 2024-11-28 PROCEDURE — C1769 GUIDE WIRE: HCPCS | Performed by: UROLOGY

## 2024-11-28 PROCEDURE — G0378 HOSPITAL OBSERVATION PER HR: HCPCS

## 2024-11-28 PROCEDURE — 250N000011 HC RX IP 250 OP 636: Performed by: ANESTHESIOLOGY

## 2024-11-28 PROCEDURE — 250N000009 HC RX 250: Performed by: REGISTERED NURSE

## 2024-11-28 PROCEDURE — 96365 THER/PROPH/DIAG IV INF INIT: CPT

## 2024-11-28 PROCEDURE — 710N000009 HC RECOVERY PHASE 1, LEVEL 1, PER MIN: Performed by: UROLOGY

## 2024-11-28 PROCEDURE — 99207 PR NO BILLABLE SERVICE THIS VISIT: CPT | Performed by: STUDENT IN AN ORGANIZED HEALTH CARE EDUCATION/TRAINING PROGRAM

## 2024-11-28 PROCEDURE — 370N000017 HC ANESTHESIA TECHNICAL FEE, PER MIN: Performed by: UROLOGY

## 2024-11-28 PROCEDURE — 250N000011 HC RX IP 250 OP 636: Performed by: NURSE ANESTHETIST, CERTIFIED REGISTERED

## 2024-11-28 PROCEDURE — 96361 HYDRATE IV INFUSION ADD-ON: CPT | Mod: 59

## 2024-11-28 PROCEDURE — 250N000013 HC RX MED GY IP 250 OP 250 PS 637: Performed by: STUDENT IN AN ORGANIZED HEALTH CARE EDUCATION/TRAINING PROGRAM

## 2024-11-28 PROCEDURE — 258N000003 HC RX IP 258 OP 636: Performed by: EMERGENCY MEDICINE

## 2024-11-28 DEVICE — URETERAL STENT
Type: IMPLANTABLE DEVICE | Site: URETER | Status: FUNCTIONAL
Brand: PERCUFLEX™ PLUS

## 2024-11-28 RX ORDER — APREPITANT 40 MG/1
40 CAPSULE ORAL ONCE
Status: COMPLETED | OUTPATIENT
Start: 2024-11-28 | End: 2024-11-28

## 2024-11-28 RX ORDER — POLYETHYLENE GLYCOL 3350 17 G/17G
17 POWDER, FOR SOLUTION ORAL 2 TIMES DAILY PRN
Status: DISCONTINUED | OUTPATIENT
Start: 2024-11-28 | End: 2024-11-29 | Stop reason: HOSPADM

## 2024-11-28 RX ORDER — IOPAMIDOL 755 MG/ML
70 INJECTION, SOLUTION INTRAVASCULAR ONCE
Status: COMPLETED | OUTPATIENT
Start: 2024-11-28 | End: 2024-11-28

## 2024-11-28 RX ORDER — CEFTRIAXONE 1 G/1
1 INJECTION, POWDER, FOR SOLUTION INTRAMUSCULAR; INTRAVENOUS ONCE
Status: COMPLETED | OUTPATIENT
Start: 2024-11-28 | End: 2024-11-28

## 2024-11-28 RX ORDER — CEFTRIAXONE 1 G/1
1 INJECTION, POWDER, FOR SOLUTION INTRAMUSCULAR; INTRAVENOUS EVERY 24 HOURS
Status: DISCONTINUED | OUTPATIENT
Start: 2024-11-29 | End: 2024-11-29 | Stop reason: HOSPADM

## 2024-11-28 RX ORDER — FLUTICASONE PROPIONATE 50 MCG
2 SPRAY, SUSPENSION (ML) NASAL DAILY
Status: DISCONTINUED | OUTPATIENT
Start: 2024-11-28 | End: 2024-11-29 | Stop reason: HOSPADM

## 2024-11-28 RX ORDER — ONDANSETRON 2 MG/ML
INJECTION INTRAMUSCULAR; INTRAVENOUS PRN
Status: DISCONTINUED | OUTPATIENT
Start: 2024-11-28 | End: 2024-11-28

## 2024-11-28 RX ORDER — ALBUTEROL SULFATE 0.83 MG/ML
2.5 SOLUTION RESPIRATORY (INHALATION) EVERY 4 HOURS PRN
Status: DISCONTINUED | OUTPATIENT
Start: 2024-11-28 | End: 2024-11-28 | Stop reason: HOSPADM

## 2024-11-28 RX ORDER — AMOXICILLIN 250 MG
1 CAPSULE ORAL 2 TIMES DAILY PRN
Status: DISCONTINUED | OUTPATIENT
Start: 2024-11-28 | End: 2024-11-29 | Stop reason: HOSPADM

## 2024-11-28 RX ORDER — ESCITALOPRAM OXALATE 10 MG/1
20 TABLET ORAL DAILY
Status: DISCONTINUED | OUTPATIENT
Start: 2024-11-28 | End: 2024-11-29 | Stop reason: HOSPADM

## 2024-11-28 RX ORDER — ACETAMINOPHEN 325 MG/1
975 TABLET ORAL ONCE
Status: COMPLETED | OUTPATIENT
Start: 2024-11-28 | End: 2024-11-28

## 2024-11-28 RX ORDER — ACETAMINOPHEN 650 MG/1
650 SUPPOSITORY RECTAL EVERY 4 HOURS PRN
Status: DISCONTINUED | OUTPATIENT
Start: 2024-11-28 | End: 2024-11-29 | Stop reason: HOSPADM

## 2024-11-28 RX ORDER — NALOXONE HYDROCHLORIDE 0.4 MG/ML
0.4 INJECTION, SOLUTION INTRAMUSCULAR; INTRAVENOUS; SUBCUTANEOUS
Status: DISCONTINUED | OUTPATIENT
Start: 2024-11-28 | End: 2024-11-29 | Stop reason: HOSPADM

## 2024-11-28 RX ORDER — HYDROXYZINE HYDROCHLORIDE 25 MG/1
25 TABLET, FILM COATED ORAL DAILY
COMMUNITY

## 2024-11-28 RX ORDER — MAGNESIUM SULFATE HEPTAHYDRATE 40 MG/ML
2 INJECTION, SOLUTION INTRAVENOUS ONCE
Status: DISCONTINUED | OUTPATIENT
Start: 2024-11-28 | End: 2024-11-28 | Stop reason: HOSPADM

## 2024-11-28 RX ORDER — NALOXONE HYDROCHLORIDE 0.4 MG/ML
0.1 INJECTION, SOLUTION INTRAMUSCULAR; INTRAVENOUS; SUBCUTANEOUS
Status: CANCELLED | OUTPATIENT
Start: 2024-11-28

## 2024-11-28 RX ORDER — ONDANSETRON 2 MG/ML
4 INJECTION INTRAMUSCULAR; INTRAVENOUS EVERY 30 MIN PRN
Status: CANCELLED | OUTPATIENT
Start: 2024-11-28

## 2024-11-28 RX ORDER — LABETALOL HYDROCHLORIDE 5 MG/ML
10 INJECTION, SOLUTION INTRAVENOUS
Status: DISCONTINUED | OUTPATIENT
Start: 2024-11-28 | End: 2024-11-28 | Stop reason: HOSPADM

## 2024-11-28 RX ORDER — LIDOCAINE 40 MG/G
CREAM TOPICAL
Status: DISCONTINUED | OUTPATIENT
Start: 2024-11-28 | End: 2024-11-29 | Stop reason: HOSPADM

## 2024-11-28 RX ORDER — CEFAZOLIN SODIUM/WATER 2 G/20 ML
2 SYRINGE (ML) INTRAVENOUS SEE ADMIN INSTRUCTIONS
Status: DISCONTINUED | OUTPATIENT
Start: 2024-11-28 | End: 2024-11-28 | Stop reason: HOSPADM

## 2024-11-28 RX ORDER — ONDANSETRON 4 MG/1
4 TABLET, ORALLY DISINTEGRATING ORAL EVERY 30 MIN PRN
Status: CANCELLED | OUTPATIENT
Start: 2024-11-28

## 2024-11-28 RX ORDER — CALCIUM CARBONATE 500 MG/1
1000 TABLET, CHEWABLE ORAL 4 TIMES DAILY PRN
Status: DISCONTINUED | OUTPATIENT
Start: 2024-11-28 | End: 2024-11-29 | Stop reason: HOSPADM

## 2024-11-28 RX ORDER — BUPROPION HYDROCHLORIDE 150 MG/1
300 TABLET ORAL DAILY
Status: DISCONTINUED | OUTPATIENT
Start: 2024-11-28 | End: 2024-11-29 | Stop reason: HOSPADM

## 2024-11-28 RX ORDER — OXYCODONE HYDROCHLORIDE 5 MG/1
5 TABLET ORAL
Status: CANCELLED | OUTPATIENT
Start: 2024-11-28

## 2024-11-28 RX ORDER — HYDROMORPHONE HYDROCHLORIDE 1 MG/ML
0.3 INJECTION, SOLUTION INTRAMUSCULAR; INTRAVENOUS; SUBCUTANEOUS
Status: DISCONTINUED | OUTPATIENT
Start: 2024-11-28 | End: 2024-11-29 | Stop reason: HOSPADM

## 2024-11-28 RX ORDER — NALOXONE HYDROCHLORIDE 0.4 MG/ML
0.2 INJECTION, SOLUTION INTRAMUSCULAR; INTRAVENOUS; SUBCUTANEOUS
Status: DISCONTINUED | OUTPATIENT
Start: 2024-11-28 | End: 2024-11-29 | Stop reason: HOSPADM

## 2024-11-28 RX ORDER — LIDOCAINE HYDROCHLORIDE 10 MG/ML
INJECTION, SOLUTION INFILTRATION; PERINEURAL PRN
Status: DISCONTINUED | OUTPATIENT
Start: 2024-11-28 | End: 2024-11-28

## 2024-11-28 RX ORDER — HYDROXYZINE HYDROCHLORIDE 25 MG/1
25 TABLET, FILM COATED ORAL DAILY
Status: DISCONTINUED | OUTPATIENT
Start: 2024-11-28 | End: 2024-11-29 | Stop reason: HOSPADM

## 2024-11-28 RX ORDER — LIDOCAINE 40 MG/G
CREAM TOPICAL
Status: DISCONTINUED | OUTPATIENT
Start: 2024-11-28 | End: 2024-11-28 | Stop reason: HOSPADM

## 2024-11-28 RX ORDER — BUSPIRONE HYDROCHLORIDE 5 MG/1
5 TABLET ORAL 3 TIMES DAILY PRN
Status: DISCONTINUED | OUTPATIENT
Start: 2024-11-28 | End: 2024-11-29 | Stop reason: HOSPADM

## 2024-11-28 RX ORDER — SODIUM CHLORIDE, SODIUM LACTATE, POTASSIUM CHLORIDE, CALCIUM CHLORIDE 600; 310; 30; 20 MG/100ML; MG/100ML; MG/100ML; MG/100ML
INJECTION, SOLUTION INTRAVENOUS CONTINUOUS
Status: DISCONTINUED | OUTPATIENT
Start: 2024-11-28 | End: 2024-11-28 | Stop reason: HOSPADM

## 2024-11-28 RX ORDER — FENTANYL CITRATE 50 UG/ML
25 INJECTION, SOLUTION INTRAMUSCULAR; INTRAVENOUS EVERY 5 MIN PRN
Status: DISCONTINUED | OUTPATIENT
Start: 2024-11-28 | End: 2024-11-28 | Stop reason: HOSPADM

## 2024-11-28 RX ORDER — FENTANYL CITRATE 50 UG/ML
INJECTION, SOLUTION INTRAMUSCULAR; INTRAVENOUS PRN
Status: DISCONTINUED | OUTPATIENT
Start: 2024-11-28 | End: 2024-11-28

## 2024-11-28 RX ORDER — ONDANSETRON 4 MG/1
4 TABLET, ORALLY DISINTEGRATING ORAL EVERY 30 MIN PRN
Status: DISCONTINUED | OUTPATIENT
Start: 2024-11-28 | End: 2024-11-28 | Stop reason: HOSPADM

## 2024-11-28 RX ORDER — ACETAMINOPHEN 325 MG/1
650 TABLET ORAL EVERY 4 HOURS PRN
Status: DISCONTINUED | OUTPATIENT
Start: 2024-11-28 | End: 2024-11-29 | Stop reason: HOSPADM

## 2024-11-28 RX ORDER — FENTANYL CITRATE 50 UG/ML
50 INJECTION, SOLUTION INTRAMUSCULAR; INTRAVENOUS EVERY 5 MIN PRN
Status: DISCONTINUED | OUTPATIENT
Start: 2024-11-28 | End: 2024-11-28 | Stop reason: HOSPADM

## 2024-11-28 RX ORDER — DIMENHYDRINATE 50 MG/ML
25 INJECTION, SOLUTION INTRAMUSCULAR; INTRAVENOUS
Status: DISCONTINUED | OUTPATIENT
Start: 2024-11-28 | End: 2024-11-28 | Stop reason: HOSPADM

## 2024-11-28 RX ORDER — CEFAZOLIN SODIUM/WATER 2 G/20 ML
2 SYRINGE (ML) INTRAVENOUS
Status: COMPLETED | OUTPATIENT
Start: 2024-11-28 | End: 2024-11-28

## 2024-11-28 RX ORDER — AMOXICILLIN 250 MG
2 CAPSULE ORAL 2 TIMES DAILY PRN
Status: DISCONTINUED | OUTPATIENT
Start: 2024-11-28 | End: 2024-11-29 | Stop reason: HOSPADM

## 2024-11-28 RX ORDER — PROPOFOL 10 MG/ML
INJECTION, EMULSION INTRAVENOUS PRN
Status: DISCONTINUED | OUTPATIENT
Start: 2024-11-28 | End: 2024-11-28

## 2024-11-28 RX ORDER — OXYCODONE HYDROCHLORIDE 5 MG/1
10 TABLET ORAL
Status: CANCELLED | OUTPATIENT
Start: 2024-11-28

## 2024-11-28 RX ORDER — HYDRALAZINE HYDROCHLORIDE 20 MG/ML
2.5-5 INJECTION INTRAMUSCULAR; INTRAVENOUS EVERY 10 MIN PRN
Status: DISCONTINUED | OUTPATIENT
Start: 2024-11-28 | End: 2024-11-28 | Stop reason: HOSPADM

## 2024-11-28 RX ORDER — KETOROLAC TROMETHAMINE 15 MG/ML
15 INJECTION, SOLUTION INTRAMUSCULAR; INTRAVENOUS ONCE
Status: COMPLETED | OUTPATIENT
Start: 2024-11-28 | End: 2024-11-28

## 2024-11-28 RX ORDER — LEVOCETIRIZINE DIHYDROCHLORIDE 5 MG/1
5 TABLET, FILM COATED ORAL DAILY PRN
Status: DISCONTINUED | OUTPATIENT
Start: 2024-11-28 | End: 2024-11-29 | Stop reason: HOSPADM

## 2024-11-28 RX ORDER — DEXAMETHASONE SODIUM PHOSPHATE 4 MG/ML
4 INJECTION, SOLUTION INTRA-ARTICULAR; INTRALESIONAL; INTRAMUSCULAR; INTRAVENOUS; SOFT TISSUE
Status: CANCELLED | OUTPATIENT
Start: 2024-11-28

## 2024-11-28 RX ORDER — DEXAMETHASONE SODIUM PHOSPHATE 4 MG/ML
4 INJECTION, SOLUTION INTRA-ARTICULAR; INTRALESIONAL; INTRAMUSCULAR; INTRAVENOUS; SOFT TISSUE
Status: DISCONTINUED | OUTPATIENT
Start: 2024-11-28 | End: 2024-11-28 | Stop reason: HOSPADM

## 2024-11-28 RX ORDER — ONDANSETRON 2 MG/ML
4 INJECTION INTRAMUSCULAR; INTRAVENOUS ONCE
Status: COMPLETED | OUTPATIENT
Start: 2024-11-28 | End: 2024-11-28

## 2024-11-28 RX ORDER — HYDROMORPHONE HYDROCHLORIDE 1 MG/ML
0.5 INJECTION, SOLUTION INTRAMUSCULAR; INTRAVENOUS; SUBCUTANEOUS ONCE
Status: COMPLETED | OUTPATIENT
Start: 2024-11-28 | End: 2024-11-28

## 2024-11-28 RX ORDER — NALOXONE HYDROCHLORIDE 1 MG/ML
0.1 INJECTION INTRAMUSCULAR; INTRAVENOUS; SUBCUTANEOUS
Status: DISCONTINUED | OUTPATIENT
Start: 2024-11-28 | End: 2024-11-28 | Stop reason: HOSPADM

## 2024-11-28 RX ORDER — OXYCODONE HYDROCHLORIDE 5 MG/1
5 TABLET ORAL EVERY 4 HOURS PRN
Status: DISCONTINUED | OUTPATIENT
Start: 2024-11-28 | End: 2024-11-29 | Stop reason: HOSPADM

## 2024-11-28 RX ORDER — DEXAMETHASONE SODIUM PHOSPHATE 10 MG/ML
INJECTION, SOLUTION INTRAMUSCULAR; INTRAVENOUS PRN
Status: DISCONTINUED | OUTPATIENT
Start: 2024-11-28 | End: 2024-11-28

## 2024-11-28 RX ORDER — ONDANSETRON 2 MG/ML
4 INJECTION INTRAMUSCULAR; INTRAVENOUS EVERY 30 MIN PRN
Status: DISCONTINUED | OUTPATIENT
Start: 2024-11-28 | End: 2024-11-28 | Stop reason: HOSPADM

## 2024-11-28 RX ADMIN — MIDAZOLAM HYDROCHLORIDE 2 MG: 1 INJECTION, SOLUTION INTRAMUSCULAR; INTRAVENOUS at 15:24

## 2024-11-28 RX ADMIN — PROPOFOL 160 MG: 10 INJECTION, EMULSION INTRAVENOUS at 15:29

## 2024-11-28 RX ADMIN — FENTANYL CITRATE 100 MCG: 50 INJECTION, SOLUTION INTRAMUSCULAR; INTRAVENOUS at 15:27

## 2024-11-28 RX ADMIN — CEFTRIAXONE SODIUM 1 G: 1 INJECTION, POWDER, FOR SOLUTION INTRAMUSCULAR; INTRAVENOUS at 04:21

## 2024-11-28 RX ADMIN — DEXAMETHASONE SODIUM PHOSPHATE 10 MG: 10 INJECTION, SOLUTION INTRAMUSCULAR; INTRAVENOUS at 15:30

## 2024-11-28 RX ADMIN — SODIUM CHLORIDE 1000 ML: 9 INJECTION, SOLUTION INTRAVENOUS at 01:27

## 2024-11-28 RX ADMIN — IOPAMIDOL 70 ML: 755 INJECTION, SOLUTION INTRAVENOUS at 02:37

## 2024-11-28 RX ADMIN — ONDANSETRON 4 MG: 2 INJECTION INTRAMUSCULAR; INTRAVENOUS at 15:45

## 2024-11-28 RX ADMIN — LIDOCAINE HYDROCHLORIDE 50 ML: 10 INJECTION, SOLUTION INFILTRATION; PERINEURAL at 15:29

## 2024-11-28 RX ADMIN — APREPITANT 40 MG: 40 CAPSULE ORAL at 14:15

## 2024-11-28 RX ADMIN — SUGAMMADEX 200 MG: 100 INJECTION, SOLUTION INTRAVENOUS at 15:47

## 2024-11-28 RX ADMIN — SODIUM CHLORIDE, POTASSIUM CHLORIDE, SODIUM LACTATE AND CALCIUM CHLORIDE: 600; 310; 30; 20 INJECTION, SOLUTION INTRAVENOUS at 14:17

## 2024-11-28 RX ADMIN — ACETAMINOPHEN 975 MG: 325 TABLET ORAL at 14:15

## 2024-11-28 RX ADMIN — KETOROLAC TROMETHAMINE 15 MG: 15 INJECTION, SOLUTION INTRAMUSCULAR; INTRAVENOUS at 01:29

## 2024-11-28 RX ADMIN — PHENYLEPHRINE HYDROCHLORIDE 100 MCG: 10 INJECTION INTRAVENOUS at 15:36

## 2024-11-28 RX ADMIN — HYDROMORPHONE HYDROCHLORIDE 0.5 MG: 1 INJECTION, SOLUTION INTRAMUSCULAR; INTRAVENOUS; SUBCUTANEOUS at 04:24

## 2024-11-28 RX ADMIN — HYDROXYZINE HYDROCHLORIDE 25 MG: 25 TABLET, FILM COATED ORAL at 08:21

## 2024-11-28 RX ADMIN — FAMOTIDINE 20 MG: 10 INJECTION, SOLUTION INTRAVENOUS at 14:14

## 2024-11-28 RX ADMIN — ONDANSETRON 4 MG: 2 INJECTION INTRAMUSCULAR; INTRAVENOUS at 01:28

## 2024-11-28 RX ADMIN — PHENYLEPHRINE HYDROCHLORIDE 100 MCG: 10 INJECTION INTRAVENOUS at 15:42

## 2024-11-28 RX ADMIN — PROPOFOL 40 MG: 10 INJECTION, EMULSION INTRAVENOUS at 15:53

## 2024-11-28 RX ADMIN — BUPROPION HYDROCHLORIDE 300 MG: 150 TABLET, FILM COATED, EXTENDED RELEASE ORAL at 08:21

## 2024-11-28 RX ADMIN — ESCITALOPRAM OXALATE 20 MG: 10 TABLET ORAL at 08:21

## 2024-11-28 RX ADMIN — Medication 2 G: at 15:25

## 2024-11-28 RX ADMIN — ACETAMINOPHEN 650 MG: 325 TABLET ORAL at 20:48

## 2024-11-28 RX ADMIN — ROCURONIUM BROMIDE 50 MG: 50 INJECTION, SOLUTION INTRAVENOUS at 15:30

## 2024-11-28 RX ADMIN — FLUTICASONE PROPIONATE 2 SPRAY: 50 SPRAY, METERED NASAL at 09:25

## 2024-11-28 ASSESSMENT — ACTIVITIES OF DAILY LIVING (ADL)
ADLS_ACUITY_SCORE: 35
ADLS_ACUITY_SCORE: 41
ADLS_ACUITY_SCORE: 24
ADLS_ACUITY_SCORE: 41
ADLS_ACUITY_SCORE: 35
ADLS_ACUITY_SCORE: 18
ADLS_ACUITY_SCORE: 35
ADLS_ACUITY_SCORE: 18
ADLS_ACUITY_SCORE: 35
ADLS_ACUITY_SCORE: 24
ADLS_ACUITY_SCORE: 24
ADLS_ACUITY_SCORE: 18
ADLS_ACUITY_SCORE: 24
ADLS_ACUITY_SCORE: 24
ADLS_ACUITY_SCORE: 41
ADLS_ACUITY_SCORE: 41
ADLS_ACUITY_SCORE: 35
ADLS_ACUITY_SCORE: 24
ADLS_ACUITY_SCORE: 35

## 2024-11-28 ASSESSMENT — COLUMBIA-SUICIDE SEVERITY RATING SCALE - C-SSRS
1. IN THE PAST MONTH, HAVE YOU WISHED YOU WERE DEAD OR WISHED YOU COULD GO TO SLEEP AND NOT WAKE UP?: NO
6. HAVE YOU EVER DONE ANYTHING, STARTED TO DO ANYTHING, OR PREPARED TO DO ANYTHING TO END YOUR LIFE?: NO
2. HAVE YOU ACTUALLY HAD ANY THOUGHTS OF KILLING YOURSELF IN THE PAST MONTH?: NO

## 2024-11-28 NOTE — OP NOTE
November 28, 2024    Preoperative diagnosis:   Right ureteral stone  Pyuria  Leukocytosis    Postoperative diagnosis: Same    Procedure: Cystoscopy with right retrograde pyelogram, right ureteral stent placement, intraoperative interpretation of fluoroscopic imaging    Surgeon: Eloisa Contreras MD     Assistant: None    Anesthesia: GETA    EBL: none  UOP: not recorded  IVF: please see anesthesia record    Drains: 6x 26 Fr double J stent    Complications: none noted    Specimens: none    Findings: Mild hydro, no hydronephrotic drip    Indication for procedure: Alia Wilson is 38 year old who presented to the ED for pain and found to have a distal obstructing right stone with mild leukocytosis and pyuria.  Given the question of infection it was recommended to proceed with ureteral stenting which she is has agreed to proceed with.    Summary of procedure:  After patient was identified in the pre-operative area and procedure verified, informed consent was obtained.  After this patient was taken to the operating suite and placed in the supine position.  Intravenous glen-operative antibiotics were administered by anesthesia.  After appropriate anesthesia was induced and the airway secured, patient was placed in the dorsal lithotomy position in supportive yellow-fin stirrups with careful attention to neural safety in positioning of all four extremities.  At this time patient was prepped and draped in the standard fashion.      A time out was performed to confirm patient and procedure.    A 22 Fr rigid cystoscope was inserted into a normal appearing urethral meatus.  The urothelium was cursorly examined and unremarkable.  There were no obvious tumors, stones, foreign body or other urothelial abnormalities noted. Bilateral ureteral orifices were noted in the normal orthotopic position.  The right ureteral orifice was identified and cannulated with a wire.  The 5 Fr catheter was then placed over it unto the renal pelvis.  The  wire was removed and there was no a hydronephrotic drip.  A gentle retrograde was done to confirm renal pelvis location-this showed mild hydronephrosis.  At this time the wire was replaced, the 5 Fr open ended was removed and a 6x 26 Fr double J stent was placed with appropriate curl noted in the renal pelvis and bladder fluoroscopically    Once appropriate hemostasis was obtained the bladder was drained and the procedure terminated.  Counts were reported as correct    Patient went to the recovery room in good condition    I attempted to call her ricardo Yanez over the telephone at the end of the case but got a generic voicemail so I left a brief message just to say that we were done in the OR    Plan: Back for floor to monitor, please check a PVR to make sure she is emptying.  May have anticholinergics and tamsulosin for pain control.  Will arrange for definitive stone management as an outpatient.    Eloisa Contreras MD MPH  (she/her/hers)   of Urology  [facility]

## 2024-11-28 NOTE — CONSULTS
November 28, 2024    Referring Provider: Dr Manish Rene MD    Primary Care Provider: Neda Castano    Assessment & Plan   Problem List Items Addressed This Visit       Ureterolithiasis    Relevant Medications    HYDROmorphone (PF) (DILAUDID) injection 0.5 mg (Completed)    oxyCODONE (ROXICODONE) tablet 5 mg    HYDROmorphone (PF) (DILAUDID) injection 0.3 mg    Other Relevant Orders    Case Request: CYSTOSCOPY, WITH RETROGRADE PYELOGRAM AND URETERAL STENT INSERTION (Completed)    Acute cystitis with hematuria    Relevant Orders    Case Request: CYSTOSCOPY, WITH RETROGRADE PYELOGRAM AND URETERAL STENT INSERTION (Completed)     Other Visit Diagnoses       RLQ abdominal pain        Nausea        Relevant Orders    Case Request: CYSTOSCOPY, WITH RETROGRADE PYELOGRAM AND URETERAL STENT INSERTION (Completed)             At this time given the distal stone with some mild obstruction and question of infection the plan will be for ureteral stenting.  We discussed how this works and the risks to include but not limited to bleeding, infection, need for antegrade decompression, need for further surgery for definitive stone management.      30 minutes were spent on this day of the encounter in reviewing the EMR including reviewing labs and CT imaging, direct patient care including surgical counseling, coordination of care and documentation    Eloisa Contreras MD MPH  (she/her/hers)   of Urology  [facility]      HPI:  Alia Wilson is a 38 year old female who presents for evaluation of her abdominal pain, was found to have multiple small kidney stones but with a right distal ureteral stone, WBC 12.8 and UA with leukocytes concerning for infection.  Given this we were consulted for management.  This is her first kidney stone.      Past Medical History:   Diagnosis Date    Placenta previa 2/6/2015    Pregnancy 4/16/2015       Past Surgical History:   Procedure Laterality Date     BIOPSY CERVICAL, LOCAL EXCISION, SINGLE/MULTIPLE      CONIZATION CERVIX,KNIFE/LASER      Description: Cervical Conization;  Recorded: 07/30/2008;    CONIZATION CERVIX,KNIFE/LASER      Description: Cervical Conization;  Proc Date: 01/01/2008;       Social History     Socioeconomic History    Marital status: Single     Spouse name: Not on file    Number of children: Not on file    Years of education: Not on file    Highest education level: Not on file   Occupational History    Not on file   Tobacco Use    Smoking status: Former    Smokeless tobacco: Never    Tobacco comments:     no smoke exposure   Vaping Use    Vaping status: Never Used   Substance and Sexual Activity    Alcohol use: No    Drug use: No    Sexual activity: Yes     Partners: Male   Other Topics Concern    Not on file   Social History Narrative    Lives with , 3 kids  Works for [employer]       Social Drivers of Health     Financial Resource Strain: Low Risk  (11/28/2024)    Financial Resource Strain     Within the past 12 months, have you or your family members you live with been unable to get utilities (heat, electricity) when it was really needed?: No   Food Insecurity: Low Risk  (11/28/2024)    Food Insecurity     Within the past 12 months, did you worry that your food would run out before you got money to buy more?: No     Within the past 12 months, did the food you bought just not last and you didn t have money to get more?: No   Transportation Needs: Low Risk  (11/28/2024)    Transportation Needs     Within the past 12 months, has lack of transportation kept you from medical appointments, getting your medicines, non-medical meetings or appointments, work, or from getting things that you need?: No   Physical Activity: Insufficiently Active (7/19/2024)    Exercise Vital Sign     Days of Exercise per Week: 2 days     Minutes of Exercise per Session: 20 min   Stress: Stress Concern Present (7/19/2024)    [place] [employer]  Occupational Health - Occupational Stress Questionnaire     Feeling of Stress : Rather much   Social Connections: Unknown (7/19/2024)    Social Connection and Isolation Panel [NHANES]     Frequency of Communication with Friends and Family: Not on file     Frequency of Social Gatherings with Friends and Family: Patient declined     Attends Mosque Services: Not on file     Active Member of Clubs or Organizations: Not on file     Attends Club or Organization Meetings: Not on file     Marital Status: Not on file   Interpersonal Safety: Low Risk  (11/28/2024)    Interpersonal Safety     Do you feel physically and emotionally safe where you currently live?: Yes     Within the past 12 months, have you been hit, slapped, kicked or otherwise physically hurt by someone?: No     Within the past 12 months, have you been humiliated or emotionally abused in other ways by your partner or ex-partner?: No   Housing Stability: Low Risk  (11/28/2024)    Housing Stability     Do you have housing? : Yes     Are you worried about losing your housing?: No     Family History   Problem Relation Age of Onset    Chronic Obstructive Pulmonary Disease Mother         early    Heart Disease Mother         atypical chest pain    Hypertension Mother     Ovarian Cancer Maternal Grandmother     Breast Cancer Paternal Grandmother     Cervical Cancer Paternal Grandmother      Allergies   Allergen Reactions    Percocet [Oxycodone-Acetaminophen] Nausea and Vomiting    Dilaudid [Hydromorphone] Unknown    Codeine Rash       Current Facility-Administered Medications   Medication Dose Route Frequency Provider Last Rate Last Admin    [Auto Hold] acetaminophen (TYLENOL) tablet 650 mg  650 mg Oral Q4H PRN Manish Rene MD        Or    [Auto Hold] acetaminophen (TYLENOL) Suppository 650 mg  650 mg Rectal Q4H PRN Manish Rene MD        acetaminophen (TYLENOL) tablet 975 mg  975 mg Oral Once Alia Mazariegos MD        aprepitant (EMEND) capsule 40 mg   40 mg Oral Once Alia Mazariegos MD        [Auto Hold] buPROPion (WELLBUTRIN XL) 24 hr tablet 300 mg  300 mg Oral Daily Manish Rene MD   300 mg at 11/28/24 0821    [Auto Hold] busPIRone (BUSPAR) tablet 5 mg  5 mg Oral TID PRN Manish Rene MD        [Auto Hold] calcium carbonate (TUMS) chewable tablet 1,000 mg  1,000 mg Oral 4x Daily PRN Manish Rene MD        ceFAZolin Sodium (ANCEF) injection 2 g  2 g Intravenous Pre-Op/Pre-procedure x 1 dose Eloisa Contreras MD        ceFAZolin Sodium (ANCEF) injection 2 g  2 g Intravenous See Admin Instructions Eloisa Contreras MD        [Auto Hold] cefTRIAXone (ROCEPHIN) 1 g vial to attach to  mL bag for ADULTS or NS 50 mL bag for PEDS  1 g Intravenous Q24H Manish Rene MD        [Auto Hold] escitalopram (LEXAPRO) tablet 20 mg  20 mg Oral Daily Manish Rene MD   20 mg at 11/28/24 0821    famotidine (PEPCID) injection 20 mg  20 mg Intravenous Once Alia Mazariegos MD        [Auto Hold] fluticasone (FLONASE) 50 MCG/ACT spray 2 spray  2 spray Nasal Daily Manish Rene MD   2 spray at 11/28/24 0925    [Auto Hold] HYDROmorphone (PF) (DILAUDID) injection 0.3 mg  0.3 mg Intravenous Q3H PRN Manish Rene MD        [Auto Hold] hydrOXYzine HCl (ATARAX) tablet 25 mg  25 mg Oral Daily Manish Rene MD   25 mg at 11/28/24 0821    lactated ringers infusion   Intravenous Continuous Alia Mazariegos MD        [Auto Hold] levocetirizine (XYZAL) tablet 5 mg  5 mg Oral Daily PRN Manish Rene MD        [Auto Hold] lidocaine (LMX4) cream   Topical Q1H PRN Manish Rene MD        lidocaine (LMX4) cream   Topical Q1H PRN Alia Mazariegos MD        [Auto Hold] lidocaine 1 % 0.1-1 mL  0.1-1 mL Other Q1H PRN Manish Rene MD        lidocaine 1 % 0.1-1 mL  0.1-1 mL Other Q1H PRN Alia Mazariegos MD        [Auto Hold] melatonin tablet 5 mg  5 mg Oral At Bedtime PRN Manish Rene MD        [Auto Hold] naloxone (NARCAN) injection 0.2 mg  0.2  "mg Intravenous Q2 Min PRN Rufino Gomez DO        Or    [Auto Hold] naloxone (NARCAN) injection 0.4 mg  0.4 mg Intravenous Q2 Min PRN Rufino Gomez DO        Or    [Auto Hold] naloxone (NARCAN) injection 0.2 mg  0.2 mg Intramuscular Q2 Min PRN Rufino Gomez DO        Or    [Auto Hold] naloxone (NARCAN) injection 0.4 mg  0.4 mg Intramuscular Q2 Min PRN Rufino Gomez DO        [Auto Hold] oxyCODONE (ROXICODONE) tablet 5 mg  5 mg Oral Q4H PRN Manish Rene MD        [Auto Hold] polyethylene glycol (MIRALAX) Packet 17 g  17 g Oral BID PRN Manish Rene MD        [Auto Hold] senna-docusate (SENOKOT-S/PERICOLACE) 8.6-50 MG per tablet 1 tablet  1 tablet Oral BID PRN Manish Rene MD        Or    [Auto Hold] senna-docusate (SENOKOT-S/PERICOLACE) 8.6-50 MG per tablet 2 tablet  2 tablet Oral BID PRN Manish Rene MD        [Auto Hold] sodium chloride (PF) 0.9% PF flush 3 mL  3 mL Intracatheter Q8H Manish Rene MD   3 mL at 11/28/24 1304    [Auto Hold] sodium chloride (PF) 0.9% PF flush 3 mL  3 mL Intracatheter q1 min prn Manish Rene MD        sodium chloride (PF) 0.9% PF flush 3 mL  3 mL Intracatheter Q8H Alia Mazariegos MD        sodium chloride (PF) 0.9% PF flush 3 mL  3 mL Intracatheter q1 min prn Alia Mazariegos MD         BP 99/65 (BP Location: Right arm)   Pulse 71   Temp 98.1  F (36.7  C) (Oral)   Resp 16   Ht 1.575 m (5' 2\")   Wt 72.6 kg (160 lb 0.9 oz)   SpO2 98%   Breastfeeding No   BMI 29.27 kg/m    GENERAL: healthy, alert and no distress  EYES: Eyes grossly normal to inspection, conjunctivae and sclerae normal  HENT: normal cephalic/atraumatic.  External ears, nose and mouth without ulcers or lesions.  RESP: no audible wheeze, cough, or visible cyanosis.  No visible retractions or increased work of breathing.  Able to speak fully in complete sentences.  NEURO: Cranial nerves grossly intact, mentation intact and speech normal  PSYCH: mentation appears normal, " affect normal/bright, judgement and insight intact, normal speech and appearance well-groomed    Labs  WBC 12.8, platelet 513  Cr 0.97  CT 2 mm nonobstructing stone in left renal pelvis, multiple small stones on right side with 3mm distabl stone with some mild hydronephrosis  HGC negative  UA with > 182 RBC, 143 WBC, 4 squamous cells      CC  Patient Care Team:  Neda Castano MD as PCP - General  Neda Castano MD as Assigned PCP  Naomy Jaffe [facility] as Pharmacist (Pharmacist)

## 2024-11-28 NOTE — ANESTHESIA PROCEDURE NOTES
Airway       Patient location during procedure: OR       Procedure Start/Stop Times: 11/28/2024 3:32 PM  Staff -        CRNA: Tello Solano APRN CRNA       Performed By: CRNA  Consent for Airway        Urgency: elective  Indications and Patient Condition       Indications for airway management: glen-procedural       Induction type:intravenous       Mask difficulty assessment: 1 - vent by mask    Final Airway Details       Final airway type: endotracheal airway       Successful airway: ETT - single  Endotracheal Airway Details        ETT size (mm): 7.0       Cuffed: yes       Successful intubation technique: direct laryngoscopy       Grade View of Cords: 1       Position: Right       Measured from: lips       Secured at (cm): 21       Bite block used: None    Post intubation assessment        Placement verified by: capnometry, equal breath sounds and chest rise        Number of attempts at approach: 1       Number of other approaches attempted: 0       Secured with: tape       Ease of procedure: easy       Dentition: Intact and Unchanged       Dental guard used and removed.    Medication(s) Administered   Medication Administration Time: 11/28/2024 3:32 PM    Additional Comments       Easy intubation.  DL.  1 attempt.  Easy.  No dental damage.  No oral trauma.

## 2024-11-28 NOTE — ANESTHESIA POSTPROCEDURE EVALUATION
Patient: Alia Wilson    Procedure: Procedure(s):  CYSTOSCOPY, WITH RETROGRADE PYELOGRAM AND URETERAL STENT INSERTION       Anesthesia Type:  General    Note:  Disposition: Outpatient   Postop Pain Control: Uneventful            Sign Out: Well controlled pain   PONV: No   Neuro/Psych: Uneventful            Sign Out: Acceptable/Baseline neuro status   Airway/Respiratory: Uneventful            Sign Out: Acceptable/Baseline resp. status   CV/Hemodynamics: Uneventful            Sign Out: Acceptable CV status; No obvious hypovolemia; No obvious fluid overload   Other NRE: NONE   DID A NON-ROUTINE EVENT OCCUR? No           Last vitals:  Vitals Value Taken Time   /71 11/28/24 1630   Temp 36.6  C (97.88  F) 11/28/24 1641   Pulse 66 11/28/24 1641   Resp 16 11/28/24 1641   SpO2 98 % 11/28/24 1641   Vitals shown include unfiled device data.    Electronically Signed By: Alia Mazariegos MD  November 28, 2024  5:55 PM

## 2024-11-28 NOTE — PROGRESS NOTES
"[facility]    Medicine Progress Note - Hospitalist Service    Date of Admission:  11/28/2024    Assessment & Plan   Alia Wilson is a 38 year old female with a past medical history of MDD, SUKHWINDER, ADHD who was admitted on 11/28/24 after presenting to [facility] ED for right renal calculi.      #Right Ureteral Calculus with Hydronephrosis  #Urinary Tract Infection  In the ED WBC 12.8, CRP negative, UA with LE and WBC, CT A/P showing right ureteral stone with right hydronephrosis.    - Urology consulted  - Tylenol, oxycodone prn, IV dilaudid prn for pain control  - Follow urine culture  - Ceftriaxone 1g IV q24h  - NPO and holding DVT prophylaxis     #Major Depressive Disorder  #Generalized Anxiety Disorder  - Continue home Wellbutrin, Buspar, and Lexapro     #Seasonal Allergies  - Continue home flonase, Xyzal, hydroxyzine          Diet: NPO per Anesthesia Guidelines for Procedure/Surgery Except for: Meds    DVT Prophylaxis: Pneumatic Compression Devices  Robertson Catheter: Not present  Lines: None     Cardiac Monitoring: None  Code Status: Full Code      Clinically Significant Risk Factors Present on Admission                             # Overweight: Estimated body mass index is 29.27 kg/m  as calculated from the following:    Height as of this encounter: 1.575 m (5' 2\").    Weight as of this encounter: 72.6 kg (160 lb 0.9 oz).              Social Drivers of Health    Depression: At risk (7/17/2024)    PHQ-2     PHQ-2 Score: 5   Tobacco Use: Medium Risk (11/28/2024)    Patient History     Smoking Tobacco Use: Former     Smokeless Tobacco Use: Never   Physical Activity: Insufficiently Active (7/19/2024)    Exercise Vital Sign     Days of Exercise per Week: 2 days     Minutes of Exercise per Session: 20 min   Stress: Stress Concern Present (7/19/2024)    [employer] [place] [employer] - Occupational Stress Questionnaire     Feeling of Stress : Rather much   Social Connections: Unknown " (7/19/2024)    Social Connection and Isolation Panel [NHANES]     Frequency of Social Gatherings with Friends and Family: Patient declined          Disposition Plan     Medically Ready for Discharge: Anticipated in 2-4 Days             Rufino Gomez DO  Hospitalist Service  [facility]  Securely message with [employer] (more info)  Text page via [employer] Paging/Directory   ______________________________________________________________________    Interval History     Physical Exam   Vital Signs: Temp: 98.1  F (36.7  C) Temp src: Oral BP: 99/65 Pulse: 71   Resp: 16 SpO2: 98 % O2 Device: None (Room air)    Weight: 160 lbs .86 oz    GEN: in no acute distress, alert and answers questions appropriately  HEENT: Moist mucus membranes, anicteric sclerae  NECK: symmetric without tracheal deviation  CV: reg rhythm, normal rate, audible s1 and s2, no murmurs / rubs / gallops  RESP: clear to auscultation, no rhonchi / rales / wheezes  ABD: non-distended, tenderness with palpation in the right lower quadrant, no rebound tenderness. Right-sided CVA tenderness present.  No left CVA tenderness.  EXT: no peripheral edema  SKIN: no suspicious skin findings, warm  NEURO: moves all four extremities, no focal deficits     Medical Decision Making       35 MINUTES SPENT BY ME on the date of service doing chart review, history, exam, documentation & further activities per the note.      Data   ------------------------- PAST 24 HR DATA REVIEWED -----------------------------------------------    I have personally reviewed the following data over the past 24 hrs:    12.8 (H)  \   13.8   / 513 (H)     139 104 14.2 /  171 (H)   4.2 19 (L) 0.97 (H) \     Procal: N/A CRP: <3.00 Lactic Acid: N/A         Imaging results reviewed over the past 24 hrs:   Recent Results (from the past 24 hours)   CT Abdomen Pelvis w Contrast    Narrative    EXAM: CT ABDOMEN PELVIS W CONTRAST  LOCATION: [facility]  DATE:  11/28/2024    INDICATION: RLQ and flank pain  COMPARISON: None.  TECHNIQUE: CT scan of the abdomen and pelvis was performed following injection of IV contrast. Multiplanar reformats were obtained. Dose reduction techniques were used.  CONTRAST: ISOVUE 370 70ML    FINDINGS:   LOWER CHEST: Lung bases are clear.    HEPATOBILIARY: Gallbladder is slightly contracted.    PANCREAS: Normal.    SPLEEN: Normal.    ADRENAL GLANDS: Normal.    KIDNEYS/BLADDER: Several punctate right renal calculi. Mild right hydronephrosis and right ureteral dilatation. 3 mm distal right ureteral calculus.  2 mm left renal calculus.    BOWEL: Normal caliber. The colon is under distended    LYMPH NODES: Normal.    VASCULATURE: Normal.    PELVIC ORGANS: Normal.    MUSCULOSKELETAL: Mild degenerative change osseous structures.      Impression    IMPRESSION:   1.  Mild right hydronephrosis. 3 mm distal right ureteral calculus.  2.  Bilateral renal calculi.  3.  The colon is underdistended reducing evaluation for colitis.

## 2024-11-28 NOTE — ED TRIAGE NOTES
Pt reports onset of RLQ abdominal pain about 1 hour ago. Pt still has her appendix. Reports nausea without emesis.

## 2024-11-28 NOTE — ED PROVIDER NOTES
EMERGENCY DEPARTMENT ENCOUNTER      NAME: Alia Wilson  AGE: 38 year old female  YOB: 1985  EVALUATION DATE & TIME: 11/28/2024  1:07 AM    ED PROVIDER: Allyn Meyer MD    Chief Complaint   Patient presents with    Abdominal Pain    Nausea       FINAL IMPRESSION  1. Ureterolithiasis    2. RLQ abdominal pain    3. Nausea    4. Acute cystitis with hematuria        MEDICAL DECISION MAKING   Alia Wilson is a 38 year old female who presents for evaluation of abdominal pain and nausea.  Patient reports onset of symptoms 1 hour prior to arrival.  She localizes discomfort to the right lower abdomen with radiation through to her back in the same spot.  She has had some associated nausea but has not had any episodes of emesis.  She reports that she did have some mild cramping earlier in the day but nothing like what woke her from sleep.  She has not had any associated fever, dysuria, hematuria.  She does have a history of Crohn's and reports that she has had some diarrhea.  No history of kidney stones or kidney infections.  No history of abdominal surgeries.  She did have an ovarian cyst as a teenager.    I considered a broad differential including but not limited to IBS flare, gastroenteritis, appendicitis, diverticulitis, ureterolithiasis, pyelonephritis, cystitis, hernia, small bowel obstruction/ileus, perforation, AAA, mesenteric ischemia.  Also considered ovarian torsion, ovarian cyst, TOA, PID, and pain secondary to vagina/cervical infection but do feel gynecologic/ovarian etiology less likely.  Discussed options for workup with the patient. We agreed on plan for labs, UA, CT abdomen/pelvis, and management of symptoms with IV fluids and IV analgesic/antiemetic.     CT revealed a 3 mm stone at the right UVJ with mild hydronephrosis.  I suspect this to be etiology of her symptoms.  CBC notable for leukocytosis with WBC of 12.8, consistent with infectious/implanter process.  CRP negative.  BMP with  mild metabolic acidosis and anion gap of 16.  Creatinine 0.97.  I updated patient after she returned from imaging.  She had improvement in symptoms but did start to have some increasing discomfort.  UA was suggestive of infection and with this, did recommend admission and IV antibiotics, which she was agreeable to.    I discussed the case with Dr. Alexandre with urology.  He agreed with plan for IV antibiotics admission, plan to see the patient in the morning.  I discussed the case with hospitalist who agreed to facilitate admission.    At the end of the encounter, we reviewed the results, potential diagnoses, as well as return precautions and recommendations for follow up. I instructed Ms. Wilson to return to the emergency department immediately if she develops any new or worsening symptoms and provided additional verbal discharge instructions. Ms. Wilson expressed understanding and agreement with this plan of care, her questions were answered, and she was discharged in stable condition.      Considerations in Medical Decision Making  History:  Obtained supplemental history: Supplemental history obtained?: Family Member/Significant Other  Reviewed external records: External records reviewed?: Documented in chart  Care impacted by chronic illness: Mental Health and Other: chronic idiopathic constipation, IBS    Work Up:  In additional to work up documented, I considered the following work up: Documented in chart, if applicable.  Chart documentation includes differential considered and any EKGs or imaging independently interpreted by provider, where specified.    External consultation:  Discussion of management with another provider: Documented in chart, if applicable    Disposition considerations: Admit.    MIPS Documentation: Not Applicable       ED COURSE  1:16 AM I met with the patient, obtained history, performed an initial exam, and discussed options and plan for diagnostics and treatment here in the ED.   3:05 AM  I rechecked the patient.   3:50 AM I rechecked the patient.   4:15 AM I discussed the case with Dr. Alexandre, urologist.   4:19 AM I discussed the case with Dr. Chandler, hospitalist.       MEDICATIONS GIVEN IN THE ED  Medications   cefTRIAXone (ROCEPHIN) 1 g vial to attach to  mL bag for ADULTS or NS 50 mL bag for PEDS (1 g Intravenous $New Bag 11/28/24 0421)   sodium chloride 0.9% BOLUS 1,000 mL (0 mLs Intravenous Stopped 11/28/24 0409)   ondansetron (ZOFRAN) injection 4 mg (4 mg Intravenous $Given 11/28/24 0128)   ketorolac (TORADOL) injection 15 mg (15 mg Intravenous $Given 11/28/24 0129)   iopamidol (ISOVUE-370) solution 70 mL (70 mLs Intravenous $Given 11/28/24 0237)   HYDROmorphone (PF) (DILAUDID) injection 0.5 mg (0.5 mg Intravenous $Given 11/28/24 0424)       NEW PRESCRIPTIONS STARTED AT TODAY'S VISIT  New Prescriptions    No medications on file          =================================================================    HPI:    Use of : N/A      Alia Wilson is a 38 year old female who presents SUKHWINDER, depression, IBS, chronic idiopathic constipation.    The patient developed RLQ abdominal pain approximately an hour ago that radiates straight through her back. She is nauseous but denies of any vomiting episodes. She endorses diarrhea. Earlier in the day she had some cramping but not as sever. She reports a history of ovarian cyst as a teenager. No history kidney stones or kidney infection. No previous abdominal surgeries. No OTC medications taken prior to arrival. No complaints of urinary symptoms, fevers, cough, or any other associated symptoms.      RELEVANT HISTORY, MEDICATIONS, & ALLERGIES   Past medical history, surgical history, family history, medications, and allergies reviewed and pertinent noted in HPI.    REVIEW OF SYSTEMS:  A complete review of systems was performed with pertinent positives and negatives noted in the HPI.     PHYSICAL EXAM:    Vitals: /86   Pulse 71    "Temp 98.1  F (36.7  C) (Oral)   Resp 16   Ht 1.575 m (5' 2\")   Wt 68 kg (150 lb)   SpO2 100%   Breastfeeding No   BMI 27.44 kg/m     General: Alert and interactive.  Appears uncomfortable, sitting upright on bed, restless, grabbing right side of abdomen.  HENT: Atraumatic. Full AROM of neck. MMM.  Cardiovascular: Regular rate and rhythm.   Chest/Pulmonary: Normal work of breathing. Speaking in complete sentences. Lungs CTAB. No chest wall tenderness or deformities.  Abdomen: Soft, nondistended.  Tenderness palpation right lower quadrant and right flank.  Extremities: Normal AROM of all major joints.  Skin: Warm and dry. Normal skin color.   Neuro: Speech clear. CNs grossly intact. Moves all extremities spontaneously.   Psych: Normal affect/mood, cooperative, memory appropriate.      LAB  Labs Ordered and Resulted from Time of ED Arrival to Time of ED Departure   BASIC METABOLIC PANEL - Abnormal       Result Value    Sodium 139      Potassium 4.2      Chloride 104      Carbon Dioxide (CO2) 19 (*)     Anion Gap 16 (*)     Urea Nitrogen 14.2      Creatinine 0.97 (*)     GFR Estimate 75      Calcium 9.2      Glucose 171 (*)    CBC WITH PLATELETS - Abnormal    WBC Count 12.8 (*)     RBC Count 4.61      Hemoglobin 13.8      Hematocrit 40.8      MCV 89      MCH 29.9      MCHC 33.8      RDW 12.8      Platelet Count 513 (*)    ROUTINE UA WITH MICROSCOPIC REFLEX TO CULTURE - Abnormal    Color Urine Light Yellow      Appearance Urine Turbid (*)     Glucose Urine Negative      Bilirubin Urine Negative      Ketones Urine Trace (*)     Specific Gravity Urine 1.031 (*)     Blood Urine >1.0 mg/dL (*)     pH Urine 6.5      Protein Albumin Urine Negative      Urobilinogen Urine <2.0      Nitrite Urine Negative      Leukocyte Esterase Urine 250 Erma/uL (*)     Mucus Urine Present (*)     RBC Urine >182 (*)     WBC Urine 143 (*)     Squamous Epithelials Urine 4 (*)    CRP INFLAMMATION - Normal    CRP Inflammation <3.00     HCG " QUALITATIVE PREGNANCY - Normal    hCG Serum Qualitative Negative     URINE CULTURE       RADIOLOGY  CT Abdomen Pelvis w Contrast   Final Result   IMPRESSION:    1.  Mild right hydronephrosis. 3 mm distal right ureteral calculus.   2.  Bilateral renal calculi.   3.  The colon is underdistended reducing evaluation for colitis.            I, Jayy Spann, am serving as a scribe to document services personally performed by Dr. Allyn Meyer based on my observation and the provider's statements to me. I, Allyn Meyer MD attest that Jayy Spann is acting in a scribe capacity, has observed my performance of the services and has documented them in accordance with my direction.    Allyn Meyer M.D.  Emergency Medicine  [facility] EMERGENCY DEPARTMENT  [facility] [facility] [id number] [phone]  Dept: [phone]       Allyn Meyer MD  11/28/24 0424

## 2024-11-28 NOTE — H&P
"[facility]    History and Physical - Hospitalist Service       Date of Admission:  11/28/2024    Assessment & Plan      Alia Wilson is a 38 year old female with a past medical history of MDD, SUKHWINDER, ADHD who was admitted on 11/28/24 after presenting to [facility] ED for right renal calculi.      #Right Ureteral Calculus with Hydronephrosis  #Urinary Tract Infection  - In the ED WBC 12.8, CRP negative, UA with LE and WBC  - CT A/P showing right ureteral stone with right hydronephrosis.  Plan  - Urology consult placed overnight  - Tylenol, oxycodone prn, IV dilaudid prn for pain control  - Follow urine culture  - Ceftriaxone 1g IV q24h  - NPO and holding DVT prophylaxis    #Major Depressive Disorder  #Generalized Anxiety Disorder  - Continue home Wellbutrin, Buspar, and Lexapro    #Seasonal Allergies  - Continue home flonase, Xyzal, hydroxyzine          Diet: NPO per Anesthesia Guidelines for Procedure/Surgery Except for: Meds    DVT Prophylaxis: Pneumatic Compression Devices and holding chemoprophylaxis pending Urology evaluation  Orbertson Catheter: Not present  Lines: None     Cardiac Monitoring: None  Code Status: Full Code      Clinically Significant Risk Factors Present on Admission                             # Overweight: Estimated body mass index is 27.44 kg/m  as calculated from the following:    Height as of this encounter: 1.575 m (5' 2\").    Weight as of this encounter: 68 kg (150 lb).              Disposition Plan     Medically Ready for Discharge: Anticipated in 2-4 Days           Manish Rene MD  Hospitalist Service  [facility]  Securely message with [employer] (more info)  Text page via [employer] Paging/Directory     ______________________________________________________________________    Chief Complaint   Right Flank Pain    History is obtained from the patient, electronic health record, emergency department physician, and paper chart    History of Present " Illness   Alia Wilson is a 38 year old female with a past medical history of MDD, SUKHWINDER, ADHD who was admitted on 24 after presenting to [facility] ED for right renal calculi.    Patient with intermittent abdominal cramping throughout the day 2024.  Had onset of right flank pain around midnight .  Pain is located in the right flank and radiates to the right lower quadrant of the abdomen.  Pain is made worse with any palpation of the back or abdomen.  No alleviating factors.  In the ED patient had elevated white count 12.8, UA concerning for infection.  CT A/P showing right renal stone with right hydronephrosis.      Past Medical History    Past Medical History:   Diagnosis Date    Placenta previa 2015    Pregnancy 2015       Past Surgical History   Past Surgical History:   Procedure Laterality Date    BIOPSY CERVICAL, LOCAL EXCISION, SINGLE/MULTIPLE      CONIZATION CERVIX,KNIFE/LASER      Description: Cervical Conization;  Recorded: 2008;    CONIZATION CERVIX,KNIFE/LASER      Description: Cervical Conization;  Proc Date: 2008;       Prior to Admission Medications   Prior to Admission Medications   Prescriptions Last Dose Informant Patient Reported? Taking?   amphetamine-dextroamphetamine (ADDERALL) 10 MG tablet 2024 Morning Self No No   Sig: Take 1 tablet (10 mg) by mouth daily In the afternoon   buPROPion (WELLBUTRIN XL) 300 MG 24 hr tablet 2024 Morning Self No Yes   Sig: TAKE 1 TABLET BY MOUTH ONCE DAILY.   busPIRone (BUSPAR) 5 MG tablet  Self No Yes   Sig: Take 1 tablet (5 mg) by mouth 3 times daily as needed (anxiety)   escitalopram (LEXAPRO) 20 MG tablet 2024 Morning Self No Yes   Sig: Take 1 tablet (20 mg) by mouth daily   etonogestrel (NEXPLANON) 68 MG IMPL More than a month Self Yes Yes   Si each (68 mg) by Subdermal route once   fluticasone (FLONASE) 50 MCG/ACT nasal spray 2024 Morning Self Yes Yes   Sig: Spray 2 sprays in nostril daily    hydrOXYzine HCl (ATARAX) 25 MG tablet 11/27/2024 Morning Self Yes Yes   Sig: Take 25 mg by mouth daily.   levocetirizine (XYZAL) 5 MG tablet  Self Yes Yes   Sig: Take 1 tablet by mouth daily as needed   polyethylene glycol (MIRALAX) 17 GM/Dose powder 11/27/2024 Morning Self No Yes   Sig: Take 17 g (1 capful) by mouth daily (as directed for constipation)      Facility-Administered Medications: None        Social History   I have reviewed this patient's social history and updated it with pertinent information if needed.  Social History     Tobacco Use    Smoking status: Former    Smokeless tobacco: Never    Tobacco comments:     no smoke exposure   Vaping Use    Vaping status: Never Used   Substance Use Topics    Alcohol use: No    Drug use: No         Family History   I have reviewed this patient's family history and updated it with pertinent information if needed.  Family History   Problem Relation Age of Onset    Chronic Obstructive Pulmonary Disease Mother         early    Heart Disease Mother         atypical chest pain    Hypertension Mother     Ovarian Cancer Maternal Grandmother     Breast Cancer Paternal Grandmother     Cervical Cancer Paternal Grandmother          Allergies   Allergies   Allergen Reactions    Percocet [Oxycodone-Acetaminophen] Nausea and Vomiting    Dilaudid [Hydromorphone] Unknown    Codeine Rash        Physical Exam   Vital Signs: Temp: 98.1  F (36.7  C) Temp src: Oral BP: 131/86 Pulse: 71   Resp: 16 SpO2: 100 % O2 Device: None (Room air)    Weight: 150 lbs 0 oz    General: Alert and Oriented x3. Answers questions appropriately. Follows commands.  Eyes:EOMI. PERRL. Normal Conjunctivae.  HENT: Normocephalic. Atraumatic.  Resp: Breath sounds equal throughout. No crackles. No wheezing.  Cardio: Regular rate and rhythm. No murmurs. No friction rub.  Abd: Soft. Non-distended.   -Tenderness with palpation in the right lower quadrant, no rebound tenderness  -Right-sided CVA tenderness present.   No left CVA tenderness.  MSK: No areas of ecchymosis or erythema.  Neuro: CN 2-12 grossly intact. Strength 5/5 in all 4 extremities.  Skin:No rashes. No jaundice.       Medical Decision Making       60 MINUTES SPENT BY ME on the date of service doing chart review, history, exam, documentation & further activities per the note.  MANAGEMENT DISCUSSED with the following over the past 24 hours: RN, ED Physician   Tests ORDERED & REVIEWED in the past 24 hours:  - BMP  - CBC  - Procal, UA, pregnancy test  Medical complexity over the past 24 hours:  - Prescription DRUG MANAGEMENT performed      Data     I have personally reviewed the following data over the past 24 hrs:    12.8 (H)  \   13.8   / 513 (H)     139 104 14.2 /  171 (H)   4.2 19 (L) 0.97 (H) \     Procal: N/A CRP: <3.00 Lactic Acid: N/A         Imaging results reviewed over the past 24 hrs:   Recent Results (from the past 24 hours)   CT Abdomen Pelvis w Contrast    Narrative    EXAM: CT ABDOMEN PELVIS W CONTRAST  LOCATION: [facility]  DATE: 11/28/2024    INDICATION: RLQ and flank pain  COMPARISON: None.  TECHNIQUE: CT scan of the abdomen and pelvis was performed following injection of IV contrast. Multiplanar reformats were obtained. Dose reduction techniques were used.  CONTRAST: ISOVUE 370 70ML    FINDINGS:   LOWER CHEST: Lung bases are clear.    HEPATOBILIARY: Gallbladder is slightly contracted.    PANCREAS: Normal.    SPLEEN: Normal.    ADRENAL GLANDS: Normal.    KIDNEYS/BLADDER: Several punctate right renal calculi. Mild right hydronephrosis and right ureteral dilatation. 3 mm distal right ureteral calculus.  2 mm left renal calculus.    BOWEL: Normal caliber. The colon is under distended    LYMPH NODES: Normal.    VASCULATURE: Normal.    PELVIC ORGANS: Normal.    MUSCULOSKELETAL: Mild degenerative change osseous structures.      Impression    IMPRESSION:   1.  Mild right hydronephrosis. 3 mm distal right ureteral calculus.  2.   Bilateral renal calculi.  3.  The colon is underdistended reducing evaluation for colitis.

## 2024-11-28 NOTE — PROGRESS NOTES
PRIMARY DIAGNOSIS: ACUTE PAIN  OUTPATIENT/OBSERVATION GOALS TO BE MET BEFORE DISCHARGE:  1. Pain Status: Improved-controlled with oral pain medications.    2. Return to near baseline physical activity: Yes    3. Cleared for discharge by consultants (if involved): No    Discharge Planner Nurse   Safe discharge environment identified: No  Barriers to discharge: Yes       Entered by: Priscila Guillermo RN 11/28/2024 11:39 AM    Patient will have cystoscopy at 15:00

## 2024-11-28 NOTE — ED NOTES
"[facility] ED Handoff Report    ED Chief Complaint:     ED Diagnosis:  (N20.1) Ureterolithiasis  Comment:   Plan:     (R10.31) RLQ abdominal pain  Comment:   Plan:     (R11.0) Nausea  Comment:   Plan:     (N30.01) Acute cystitis with hematuria  Comment:   Plan:        PMH:    Past Medical History:   Diagnosis Date    Placenta previa 2/6/2015    Pregnancy 4/16/2015        Code Status:  Full Code     Falls Risk: N/A Band: Not applicable    Current Living Situation/Residence: lives with a significant other     Elimination Status: Continent: Yes     Activity Level: Independent    Patients Preferred Language:  English     Needed: No    Vital Signs:  /62   Pulse 81   Temp 98  F (36.7  C) (Oral)   Resp 16   Ht 1.575 m (5' 2\")   Wt 68 kg (150 lb)   SpO2 95%   Breastfeeding No   BMI 27.44 kg/m       Pain Score: 3/10    Is the Patient Confused:  No    Last Food or Drink: 11/28/24 at 0000    Treatments Provided: IVF, pain medication, IV Abx      Belongings Checklist Done and Signed by Patient: Yes      Additional Information: LS clear. BS active. Patient alert and oriented x4. Independent with cares and able to make needs known.     RN: Sharon Pisano RN   11/28/2024 7:08 AM      "

## 2024-11-28 NOTE — ANESTHESIA CARE TRANSFER NOTE
Patient: Alia Wilson    Procedure: Procedure(s):  CYSTOSCOPY, WITH RETROGRADE PYELOGRAM AND URETERAL STENT INSERTION     Diagnosis: Ureterolithiasis [N20.1]  Nausea [R11.0]  Acute cystitis with hematuria [N30.01]    Anesthesia Type:   General     Note:    Oropharynx: spontaneously breathing  Level of Consciousness: awake  Oxygen Supplementation: face mask  Level of Supplemental Oxygen (L/min / FiO2): 5  Independent Airway: airway patency satisfactory and stable  Dentition: dentition unchanged  Vital Signs Stable: post-procedure vital signs reviewed and stable  Report to RN Given: handoff report given  Patient transferred to: PACU    Handoff Report: Identifed the Patient, Identified the Reponsible Provider, Reviewed the pertinent medical history, Discussed the surgical course, Reviewed Intra-OP anesthesia mangement and issues during anesthesia, Set expectations for post-procedure period and Allowed opportunity for questions and acknowledgement of understanding      Vitals:  Vitals Value Taken Time   /72 11/28/24 1600   Temp     Pulse 91 11/28/24 1605   Resp 20 11/28/24 1605   SpO2 96 % 11/28/24 1605     Electronically Signed By: MONIKA Mclaughlin CRNA  November 28, 2024  4:07 PM

## 2024-11-28 NOTE — PLAN OF CARE
Goal Outcome Evaluation:      Plan of Care Reviewed With: patient    Overall Patient Progress: no change    Patient is alert and oriented x 4. Patient is currently having a cystoscopy at present. Was NPO prior to procedure. Patient is independent for mobility.  Current signed and held order are pre procedure.

## 2024-11-28 NOTE — ANESTHESIA PREPROCEDURE EVALUATION
Anesthesia Pre-Procedure Evaluation    Patient: Alia Wilson   MRN: [id number] : 1985        Procedure : Procedure(s):  CYSTOSCOPY, WITH RETROGRADE PYELOGRAM AND URETERAL STENT INSERTION          Past Medical History:   Diagnosis Date    Placenta previa 2015    Pregnancy 2015      Past Surgical History:   Procedure Laterality Date    BIOPSY CERVICAL, LOCAL EXCISION, SINGLE/MULTIPLE      CONIZATION CERVIX,KNIFE/LASER      Description: Cervical Conization;  Recorded: 2008;    CONIZATION CERVIX,KNIFE/LASER      Description: Cervical Conization;  Proc Date: 2008;      Allergies   Allergen Reactions    Percocet [Oxycodone-Acetaminophen] Nausea and Vomiting    Dilaudid [Hydromorphone] Unknown    Codeine Rash      Social History     Tobacco Use    Smoking status: Former    Smokeless tobacco: Never    Tobacco comments:     no smoke exposure   Substance Use Topics    Alcohol use: No      Wt Readings from Last 1 Encounters:   24 72.6 kg (160 lb 0.9 oz)        Anesthesia Evaluation            ROS/MED HX  ENT/Pulmonary:  - neg pulmonary ROS     Neurologic:  - neg neurologic ROS     Cardiovascular:  - neg cardiovascular ROS   (+)  - -   -  - -                          Irregular Heartbeat/Palpitations,       Previous cardiac testing   Echo: Date: Results:    Stress Test:  Date: Results:    ECG Reviewed:  Date: 2022 Results:  Sinus rhythm   Normal ECG   When compared with ECG of 01-AUG-2022 20:09,   No significant change was found   Confirmed by SEE ED PROVIDER NOTE FOR, ECG INTERPRETATION ([id number]),  MARIYA ALEGRIA ([id number]) on 8/3/2022 9:46:48 AM     Cath:  Date: Results:      METS/Exercise Tolerance:     Hematologic:  - neg hematologic  ROS     Musculoskeletal:  - neg musculoskeletal ROS     GI/Hepatic: Comment: Irritible Bowel Syndrome    GERD poorly controlled- symptoms a few days ago    (+) GERD,                   Renal/Genitourinary:  - neg Renal ROS     Endo:     (+)              "  Obesity,       Psychiatric/Substance Use:     (+) psychiatric history anxiety and depression       Infectious Disease:  - neg infectious disease ROS     Malignancy:  - neg malignancy ROS     Other:  - neg other ROS        /62 (BP Location: Right arm)   Pulse 68   Temp 36.6  C (97.9  F) (Oral)   Resp 16   Ht 1.575 m (5' 2\")   Wt 72.6 kg (160 lb 0.9 oz)   SpO2 98%   Breastfeeding No   BMI 29.27 kg/m      Physical Exam    Airway  airway exam normal       TM distance: > 3 FB   Neck ROM: full   Mouth opening: > 3 cm    Respiratory Devices and Support         Dental       (+) Minor Abnormalities - some fillings, tiny chips      Cardiovascular   cardiovascular exam normal       Rhythm and rate: regular and normal     Pulmonary   pulmonary exam normal        breath sounds clear to auscultation           OUTSIDE LABS:  CBC:   Lab Results   Component Value Date    WBC 12.8 (H) 11/28/2024    WBC 7.1 07/19/2024    HGB 13.8 11/28/2024    HGB 14.0 07/19/2024    HCT 40.8 11/28/2024    HCT 42.5 07/19/2024     (H) 11/28/2024     (H) 07/19/2024     BMP:   Lab Results   Component Value Date     11/28/2024     07/19/2024    POTASSIUM 4.2 11/28/2024    POTASSIUM 4.2 07/19/2024    CHLORIDE 104 11/28/2024    CHLORIDE 103 07/19/2024    CO2 19 (L) 11/28/2024    CO2 25 07/19/2024    BUN 14.2 11/28/2024    BUN 15.2 07/19/2024    CR 0.97 (H) 11/28/2024    CR 0.97 (H) 07/19/2024     (H) 11/28/2024    GLC 94 07/19/2024     COAGS: No results found for: \"PTT\", \"INR\", \"FIBR\"  POC:   Lab Results   Component Value Date    HCG Negative 05/23/2024    HCGS Negative 11/28/2024     HEPATIC:   Lab Results   Component Value Date    ALBUMIN 4.3 07/19/2024    PROTTOTAL 7.1 07/19/2024    ALT 9 07/19/2024    AST 22 07/19/2024    ALKPHOS 74 07/19/2024    BILITOTAL 0.6 07/19/2024     OTHER:   Lab Results   Component Value Date    SAL 9.2 11/28/2024    TSH 1.53 07/19/2024       Anesthesia Plan    ASA Status:  2, " "emergent    NPO Status:  NPO Appropriate (GERD poorly controlled)    Anesthesia Type: General.     - Airway: ETT   Induction: Propofol, Intravenous.   Maintenance: Balanced.        Consents    Anesthesia Plan(s) and associated risks, benefits, and realistic alternatives discussed. Questions answered and patient/representative(s) expressed understanding.     - Discussed: Risks, Benefits and Alternatives for BOTH SEDATION and the PROCEDURE were discussed     - Discussed with:  Patient      - Extended Intubation/Ventilatory Support Discussed: No.      - Patient is DNR/DNI Status: No     Use of blood products discussed: Yes.     - Discussed with: Patient.     - Consented: consented to blood products     Postoperative Care    Pain management: IV analgesics, Oral pain medications, Peripheral nerve block (Single Shot).   PONV prophylaxis: Ondansetron (or other 5HT-3), Dexamethasone or Solumedrol, Aprepitant     Comments:    Other Comments: Plan GA. Risks, options, alternatives and plan discussed, pt understands and agrees to proceed. Risks include but are not limited to death, heart attack, seizure, stroke, kidney damage, nerve damage, bleeding, dental/soft tissue damage, sore throat, back pain, urinary retention, headache, emergence delirium, nausea and vomiting.             Alia Mazariegos MD    I have reviewed the pertinent notes and labs in the chart from the past 30 days and (re)examined the patient.  Any updates or changes from those notes are reflected in this note.                         # Overweight: Estimated body mass index is 29.27 kg/m  as calculated from the following:    Height as of this encounter: 1.575 m (5' 2\").    Weight as of this encounter: 72.6 kg (160 lb 0.9 oz).             "

## 2024-11-28 NOTE — PROGRESS NOTES
Patient admitted to room 6 at approximately 0745 via wheel chair from emergency room.  Reason for Admission: abdominal pain  Report received from: no one-   Patient was accompanied by Self.  Discharge transportation provided by:  Patient ambulated/transferred:  independently. self.  Patient is alert and orientated x 4.  Outpatient Observation education provided to: (patient, family, friend)  MDRO Education done if applicable (MRSA, VRE, etc)  Safety risks were identified during admission:  none.   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    Patient has PCDs on at present

## 2024-11-28 NOTE — MEDICATION SCRIBE - ADMISSION MEDICATION HISTORY
Medication Scribe Admission Medication History    Admission medication history is complete. The information provided in this note is only as accurate as the sources available at the time of the update.    Information Source(s): Patient via in-person    Pertinent Information: Patient states that she takes Hydroxyzine 25 mg tablet once daily instead of 1-2 tid prn    Changes made to PTA medication list:  Added: None  Deleted: None  Changed: Hydroxyzine  from 1-3 tid prn to 25 mg daily (per patient)    Allergies reviewed with patient and updates made in EHR: yes    Medication History Completed By: Linda Arguelles 11/28/2024 4:30 AM    PTA Med List   Medication Sig Last Dose/Taking    buPROPion (WELLBUTRIN XL) 300 MG 24 hr tablet TAKE 1 TABLET BY MOUTH ONCE DAILY. 11/27/2024 Morning    busPIRone (BUSPAR) 5 MG tablet Take 1 tablet (5 mg) by mouth 3 times daily as needed (anxiety) Taking As Needed    escitalopram (LEXAPRO) 20 MG tablet Take 1 tablet (20 mg) by mouth daily 11/27/2024 Morning    etonogestrel (NEXPLANON) 68 MG IMPL 1 each (68 mg) by Subdermal route once More than a month    fluticasone (FLONASE) 50 MCG/ACT nasal spray Spray 2 sprays in nostril daily 11/27/2024 Morning    hydrOXYzine HCl (ATARAX) 25 MG tablet Take 25 mg by mouth daily. 11/27/2024 Morning    levocetirizine (XYZAL) 5 MG tablet Take 1 tablet by mouth daily as needed Taking As Needed    polyethylene glycol (MIRALAX) 17 GM/Dose powder Take 17 g (1 capful) by mouth daily (as directed for constipation) 11/27/2024 Morning

## 2024-11-29 VITALS
SYSTOLIC BLOOD PRESSURE: 109 MMHG | DIASTOLIC BLOOD PRESSURE: 72 MMHG | BODY MASS INDEX: 29.45 KG/M2 | WEIGHT: 160.05 LBS | HEIGHT: 62 IN | TEMPERATURE: 98.1 F | RESPIRATION RATE: 12 BRPM | OXYGEN SATURATION: 97 % | HEART RATE: 75 BPM

## 2024-11-29 LAB
ANION GAP SERPL CALCULATED.3IONS-SCNC: 8 MMOL/L (ref 7–15)
BUN SERPL-MCNC: 8.2 MG/DL (ref 6–20)
CALCIUM SERPL-MCNC: 8.4 MG/DL (ref 8.8–10.4)
CHLORIDE SERPL-SCNC: 109 MMOL/L (ref 98–107)
CREAT SERPL-MCNC: 0.93 MG/DL (ref 0.51–0.95)
EGFRCR SERPLBLD CKD-EPI 2021: 79 ML/MIN/1.73M2
ERYTHROCYTE [DISTWIDTH] IN BLOOD BY AUTOMATED COUNT: 13.1 % (ref 10–15)
GLUCOSE SERPL-MCNC: 86 MG/DL (ref 70–99)
HCO3 SERPL-SCNC: 25 MMOL/L (ref 22–29)
HCT VFR BLD AUTO: 37.6 % (ref 35–47)
HGB BLD-MCNC: 12.5 G/DL (ref 11.7–15.7)
MAGNESIUM SERPL-MCNC: 1.8 MG/DL (ref 1.7–2.3)
MCH RBC QN AUTO: 30.3 PG (ref 26.5–33)
MCHC RBC AUTO-ENTMCNC: 33.2 G/DL (ref 31.5–36.5)
MCV RBC AUTO: 91 FL (ref 78–100)
PHOSPHATE SERPL-MCNC: 3.8 MG/DL (ref 2.5–4.5)
PLATELET # BLD AUTO: 395 10E3/UL (ref 150–450)
POTASSIUM SERPL-SCNC: 3.8 MMOL/L (ref 3.4–5.3)
RBC # BLD AUTO: 4.13 10E6/UL (ref 3.8–5.2)
SODIUM SERPL-SCNC: 142 MMOL/L (ref 135–145)
WBC # BLD AUTO: 8.1 10E3/UL (ref 4–11)

## 2024-11-29 PROCEDURE — 84100 ASSAY OF PHOSPHORUS: CPT | Performed by: STUDENT IN AN ORGANIZED HEALTH CARE EDUCATION/TRAINING PROGRAM

## 2024-11-29 PROCEDURE — 85018 HEMOGLOBIN: CPT | Performed by: STUDENT IN AN ORGANIZED HEALTH CARE EDUCATION/TRAINING PROGRAM

## 2024-11-29 PROCEDURE — 250N000011 HC RX IP 250 OP 636: Performed by: STUDENT IN AN ORGANIZED HEALTH CARE EDUCATION/TRAINING PROGRAM

## 2024-11-29 PROCEDURE — 99207 PR APP CREDIT; MD BILLING SHARED VISIT: CPT

## 2024-11-29 PROCEDURE — G0378 HOSPITAL OBSERVATION PER HR: HCPCS

## 2024-11-29 PROCEDURE — 83735 ASSAY OF MAGNESIUM: CPT | Performed by: STUDENT IN AN ORGANIZED HEALTH CARE EDUCATION/TRAINING PROGRAM

## 2024-11-29 PROCEDURE — 80048 BASIC METABOLIC PNL TOTAL CA: CPT | Performed by: STUDENT IN AN ORGANIZED HEALTH CARE EDUCATION/TRAINING PROGRAM

## 2024-11-29 PROCEDURE — 250N000013 HC RX MED GY IP 250 OP 250 PS 637

## 2024-11-29 PROCEDURE — 96376 TX/PRO/DX INJ SAME DRUG ADON: CPT

## 2024-11-29 PROCEDURE — 99239 HOSP IP/OBS DSCHRG MGMT >30: CPT | Mod: FS | Performed by: INTERNAL MEDICINE

## 2024-11-29 PROCEDURE — 36415 COLL VENOUS BLD VENIPUNCTURE: CPT | Performed by: STUDENT IN AN ORGANIZED HEALTH CARE EDUCATION/TRAINING PROGRAM

## 2024-11-29 PROCEDURE — 250N000013 HC RX MED GY IP 250 OP 250 PS 637: Performed by: STUDENT IN AN ORGANIZED HEALTH CARE EDUCATION/TRAINING PROGRAM

## 2024-11-29 PROCEDURE — 84132 ASSAY OF SERUM POTASSIUM: CPT | Performed by: STUDENT IN AN ORGANIZED HEALTH CARE EDUCATION/TRAINING PROGRAM

## 2024-11-29 RX ORDER — OXYBUTYNIN CHLORIDE 5 MG/1
5 TABLET ORAL 3 TIMES DAILY PRN
Qty: 18 TABLET | Refills: 0 | Status: SHIPPED | OUTPATIENT
Start: 2024-11-29

## 2024-11-29 RX ORDER — OXYBUTYNIN CHLORIDE 5 MG/1
5 TABLET ORAL 3 TIMES DAILY PRN
Status: DISCONTINUED | OUTPATIENT
Start: 2024-11-29 | End: 2024-11-29 | Stop reason: HOSPADM

## 2024-11-29 RX ORDER — TAMSULOSIN HYDROCHLORIDE 0.4 MG/1
0.4 CAPSULE ORAL DAILY
Qty: 7 CAPSULE | Refills: 0 | Status: SHIPPED | OUTPATIENT
Start: 2024-11-29 | End: 2024-12-06

## 2024-11-29 RX ORDER — PHENAZOPYRIDINE HYDROCHLORIDE 100 MG/1
100 TABLET, FILM COATED ORAL 3 TIMES DAILY PRN
Status: DISCONTINUED | OUTPATIENT
Start: 2024-11-29 | End: 2024-11-29 | Stop reason: HOSPADM

## 2024-11-29 RX ORDER — TAMSULOSIN HYDROCHLORIDE 0.4 MG/1
0.4 CAPSULE ORAL DAILY
Status: DISCONTINUED | OUTPATIENT
Start: 2024-11-29 | End: 2024-11-29 | Stop reason: HOSPADM

## 2024-11-29 RX ORDER — CEPHALEXIN 500 MG/1
500 CAPSULE ORAL 2 TIMES DAILY
Qty: 14 CAPSULE | Refills: 0 | Status: SHIPPED | OUTPATIENT
Start: 2024-11-29 | End: 2024-12-06

## 2024-11-29 RX ORDER — PHENAZOPYRIDINE HYDROCHLORIDE 100 MG/1
100 TABLET, FILM COATED ORAL 3 TIMES DAILY PRN
Qty: 18 TABLET | Refills: 0 | Status: SHIPPED | OUTPATIENT
Start: 2024-11-29

## 2024-11-29 RX ADMIN — FLUTICASONE PROPIONATE 2 SPRAY: 50 SPRAY, METERED NASAL at 08:21

## 2024-11-29 RX ADMIN — OXYCODONE HYDROCHLORIDE 5 MG: 5 TABLET ORAL at 05:56

## 2024-11-29 RX ADMIN — HYDROXYZINE HYDROCHLORIDE 25 MG: 25 TABLET, FILM COATED ORAL at 08:21

## 2024-11-29 RX ADMIN — CEFTRIAXONE SODIUM 1 G: 1 INJECTION, POWDER, FOR SOLUTION INTRAMUSCULAR; INTRAVENOUS at 05:56

## 2024-11-29 RX ADMIN — BUPROPION HYDROCHLORIDE 300 MG: 150 TABLET, FILM COATED, EXTENDED RELEASE ORAL at 08:21

## 2024-11-29 RX ADMIN — ESCITALOPRAM OXALATE 20 MG: 10 TABLET ORAL at 08:21

## 2024-11-29 RX ADMIN — TAMSULOSIN HYDROCHLORIDE 0.4 MG: 0.4 CAPSULE ORAL at 11:40

## 2024-11-29 ASSESSMENT — ACTIVITIES OF DAILY LIVING (ADL)
ADLS_ACUITY_SCORE: 24

## 2024-11-29 NOTE — DISCHARGE SUMMARY
"[facility]  Hospitalist Discharge Summary      Date of Admission:  11/28/2024  Date of Discharge:  11/29/2024  Discharging Provider: Corinna Gonzales NP  Discharge Service: Hospitalist Service    Discharge Diagnoses   3mm right renal calculi s/p cystoscopy with pyelogram and stenting  Hydronephrosis in setting of UTI    Clinically Significant Risk Factors     # Overweight: Estimated body mass index is 29.27 kg/m  as calculated from the following:    Height as of this encounter: 1.575 m (5' 2\").    Weight as of this encounter: 72.6 kg (160 lb 0.9 oz).       Follow-ups Needed After Discharge   Follow-up Appointments       Hospital Follow-up with Existing Primary Care Provider (PCP)      Please see details below         Schedule Primary Care visit within: 7 Days       Hospital Follow-up with Existing Primary Care Provider (PCP)      Please see details below         Schedule Primary Care visit within: 14 Days               Unresulted Labs Ordered in the Past 30 Days of this Admission       Date and Time Order Name Status Description    11/28/2024  3:35 AM Urine Culture Preliminary         These results will be followed up by PCP    Discharge Disposition   Discharged to home  Condition at discharge: Stable    Hospital Course      Alia Wilson is a 38 year old female with a past medical history of MDD, SUKHWINDER, ADHD who was admitted on 11/28/24 after presenting to ED for right flank pain that radiates to RLQ abdomen in setting of right renal calculi.      Right Ureteral Calculus with Hydronephrosis  Urinary Tract Infection  -WBC-12.8-->8.1  -CRP-<3.00  -UA-positive leuk esterase and WBC. Nitrate negative. Ceftriaxone while inpatient. Discharge on Keflex  -UC-pending  -CT abdomen/pelvis- 3mm right ureteral stone with right hydronephrosis.  -Urology consulted-cystoscopy with pyelogram and stenting done. Patient tolerated procedure well  -Pain has been manageable. Had some oxycodone overnight with " relief. As needed flomax, oxybutynin, and pyridium per Urology     Major Depressive Disorder  Generalized Anxiety Disorder  -PTA Wellbutrin, Buspar, and Lexapro    Seasonal Allergies  -PTA flonase, Xyzal, hydroxyzine    Consultations This Hospital Stay   UROLOGY IP CONSULT    Code Status   Full Code    Time Spent on this Encounter   I, Corinna Gonzales NP, personally saw the patient today and spent greater than 30 minutes discharging this patient.       Corinna Gonzales NP  [facility]TEND[facility]OV[facility]ORT STAY  [address] [place] [id number]  Phone: [phone]  Fax: [phone]  ______________________________________________________________________    Physical Exam   Vital Signs: Temp: 98.1  F (36.7  C) Temp src: Oral BP: 109/72 Pulse: 75   Resp: 12 SpO2: 97 % O2 Device: None (Room air) Oxygen Delivery: 1 LPM  Weight: 160 lbs .86 oz  Constitutional: awake, alert, cooperative, no apparent distress, and appears stated age  Respiratory: No increased work of breathing, good air exchange, clear to auscultation bilaterally, no crackles or wheezing  Cardiovascular: Normal apical impulse, regular rate and rhythm, normal S1 and S2, no S3 or S4, and no murmur noted  GI: No scars, normal bowel sounds, soft, non-distended, non-tender, no masses palpated, no hepatosplenomegally       Primary Care Physician   MIKE FERNANDEZ    Discharge Orders      Reason for your hospital stay    3mm right renal calculi. Hydronephrosis in setting of UTI.  S/p cystoscopy with retrograde pyelogram and stenting     Activity    Your activity upon discharge: activity as tolerated     Reason for your hospital stay    Right ureteral stone  Hydronephrosis in setting of UTI     Activity    Your activity upon discharge: activity as tolerated     Diet    Follow this diet upon discharge: Current Diet:Orders Placed This Encounter      Regular Diet Adult     Hospital Follow-up with Existing  Primary Care Provider (PCP)    Please see details below          Hospital Follow-up with Existing Primary Care Provider (PCP)    Please see details below            Significant Results and Procedures   Results for orders placed or performed during the hospital encounter of 11/28/24   CT Abdomen Pelvis w Contrast    Narrative    EXAM: CT ABDOMEN PELVIS W CONTRAST  LOCATION: [facility]  DATE: 11/28/2024    INDICATION: RLQ and flank pain  COMPARISON: None.  TECHNIQUE: CT scan of the abdomen and pelvis was performed following injection of IV contrast. Multiplanar reformats were obtained. Dose reduction techniques were used.  CONTRAST: ISOVUE 370 70ML    FINDINGS:   LOWER CHEST: Lung bases are clear.    HEPATOBILIARY: Gallbladder is slightly contracted.    PANCREAS: Normal.    SPLEEN: Normal.    ADRENAL GLANDS: Normal.    KIDNEYS/BLADDER: Several punctate right renal calculi. Mild right hydronephrosis and right ureteral dilatation. 3 mm distal right ureteral calculus.  2 mm left renal calculus.    BOWEL: Normal caliber. The colon is under distended    LYMPH NODES: Normal.    VASCULATURE: Normal.    PELVIC ORGANS: Normal.    MUSCULOSKELETAL: Mild degenerative change osseous structures.      Impression    IMPRESSION:   1.  Mild right hydronephrosis. 3 mm distal right ureteral calculus.  2.  Bilateral renal calculi.  3.  The colon is underdistended reducing evaluation for colitis.   XR Surgery BEBO Fluoro L/T 5 Min    Narrative    This exam was marked as non-reportable because it will not be read by a   radiologist or a [place] non-radiologist provider.             Discharge Medications   Current Discharge Medication List        START taking these medications    Details   cephALEXin (KEFLEX) 500 MG capsule Take 1 capsule (500 mg) by mouth 2 times daily for 7 days.  Qty: 14 capsule, Refills: 0    Associated Diagnoses: Acute cystitis with hematuria      oxyBUTYnin (DITROPAN) 5 MG tablet Take 1 tablet  (5 mg) by mouth 3 times daily as needed for bladder spasms.  Qty: 18 tablet, Refills: 0    Associated Diagnoses: Right renal stone      phenazopyridine (PYRIDIUM) 100 MG tablet Take 1 tablet (100 mg) by mouth 3 times daily as needed for urinary tract discomfort.  Qty: 18 tablet, Refills: 0    Associated Diagnoses: Right renal stone      tamsulosin (FLOMAX) 0.4 MG capsule Take 1 capsule (0.4 mg) by mouth daily for 7 days.  Qty: 7 capsule, Refills: 0    Associated Diagnoses: Right renal stone           CONTINUE these medications which have NOT CHANGED    Details   buPROPion (WELLBUTRIN XL) 300 MG 24 hr tablet TAKE 1 TABLET BY MOUTH ONCE DAILY.  Qty: 90 tablet, Refills: 0    Associated Diagnoses: SUKHWINDER (generalized anxiety disorder)      busPIRone (BUSPAR) 5 MG tablet Take 1 tablet (5 mg) by mouth 3 times daily as needed (anxiety)  Qty: 60 tablet, Refills: 1    Associated Diagnoses: SUKHWINDER (generalized anxiety disorder)      escitalopram (LEXAPRO) 20 MG tablet Take 1 tablet (20 mg) by mouth daily  Qty: 90 tablet, Refills: 1    Comments: Please fill this increased dose of Escitalopram; patient will not be taking the Fluoxetine.  Associated Diagnoses: SUKHWINDER (generalized anxiety disorder)      etonogestrel (NEXPLANON) 68 MG IMPL 1 each (68 mg) by Subdermal route once      fluticasone (FLONASE) 50 MCG/ACT nasal spray Spray 2 sprays in nostril daily      hydrOXYzine HCl (ATARAX) 25 MG tablet Take 25 mg by mouth daily.      levocetirizine (XYZAL) 5 MG tablet Take 1 tablet by mouth daily as needed      polyethylene glycol (MIRALAX) 17 GM/Dose powder Take 17 g (1 capful) by mouth daily (as directed for constipation)  Qty: 1530 g, Refills: 0    Associated Diagnoses: Chronic idiopathic constipation      amphetamine-dextroamphetamine (ADDERALL) 10 MG tablet Take 1 tablet (10 mg) by mouth daily In the afternoon  Qty: 30 tablet, Refills: 0    Associated Diagnoses: Attention deficit hyperactivity disorder (ADHD), predominantly inattentive  type           Allergies   Allergies   Allergen Reactions    Percocet [Oxycodone-Acetaminophen] Nausea and Vomiting    Dilaudid [Hydromorphone] Unknown    Codeine Rash

## 2024-11-29 NOTE — PLAN OF CARE
Goal Outcome Evaluation:      Plan of Care Reviewed With: patient    Overall Patient Progress: no changeOverall Patient Progress: no change    Patient discharged to home at 1325 via Private Car.  Accompanied by self and staff.  Discharge instructions were reviewed with patient, opportunity offered to ask questions.    Prescriptions e-prescribed to pharmacy.  Access discontinued: Yes  Care plan and education discontinued: Yes  Home meds retrieved from pharmacy: Yes  Belongings were sent home with patient/family:  Clothing: Shirt(s): shirt, pants, sweatshirt, cell phone, and brown slippers shoes .

## 2024-11-29 NOTE — PLAN OF CARE
"PRIMARY DIAGNOSIS: \"GENERIC\" NURSING  OUTPATIENT/OBSERVATION GOALS TO BE MET BEFORE DISCHARGE:  ADLs back to baseline: No    Activity and level of assistance: Ambulating independently.    Pain status: Pain free.    Return to near baseline physical activity: No     Discharge Planner Nurse   Safe discharge environment identified: Yes  Barriers to discharge: Yes       Entered by: Chelle Díaz RN 11/28/2024 6:34 PM     Please review provider order for any additional goals.   Nurse to notify provider when observation goals have been met and patient is ready for discharge.Goal Outcome Evaluation:                        "

## 2024-11-29 NOTE — PLAN OF CARE
Goal Outcome Evaluation:      Plan of Care Reviewed With: patient    Overall Patient Progress: no changeOverall Patient Progress: no change    Assumed cares: [id number]  Vitals: VSS on RA  Pain: Pt denies any pain  Neuro: A&Ox4  Cardiac: WDL  Respiratory: WDL  GI/: WDL  Skin: No new skin changes  IV/Drains: L PIV- SL  Activity: Independent  Behavior: Pt is calm and cooperative    Plan of Care: Follow patient plan of care

## 2024-11-29 NOTE — PLAN OF CARE
PRIMARY DIAGNOSIS: ACUTE PAIN  OUTPATIENT/OBSERVATION GOALS TO BE MET BEFORE DISCHARGE:  1. Pain Status: Improved-controlled with oral pain medications.    2. Return to near baseline physical activity: Yes    3. Cleared for discharge by consultants (if involved): No    Discharge Planner Nurse   Safe discharge environment identified: Yes  Barriers to discharge: Yes       Entered by: Chelle Díaz RN 11/28/2024 10:10 PM     Please review provider order for any additional goals.   Nurse to notify provider when observation goals have been met and patient is ready for discharge.Goal Outcome Evaluation:       AxO4, VSS, RA. Pt rating RLQ pain returning, similar to what she had prior to surgery, given tylenol. Pt ambulating independently to bathroom. PVR of 44 mL. Pt tolerating regular diet.

## 2024-11-29 NOTE — PROGRESS NOTES
[facility] Urology Progress Note      Assessment and Plan:     Alia Wilson is a 38 year old year old female who was admitted for right 3 mm renal calculi with hydronephrosis in the setting of UTI. She underwent cystoscopy with retrograde pyelogram and stenting on 11/28 with Dr. Contreras. Afebrile hemodynamically stable. Cr 9.3 (0.97). WBC 8.1 (12.8). hbg 12.5 (13.8).    - UC pending, continue ceftriaxone until culture sensitivities result. Discharge with appropriate abx for at least 1 week  - Flomax, pyridium and oxybutynin for stent discomfort PRN. Discharge with the same  - Definitive stone management in 2-3 weeks. Case request sent. Urology team will reach out to the patient to schedule this    Discussed the case with Dr. Contreras who is in agreement with the plan    Rubi Cox PA-C  [facility] Urology  Office: [phone]  Pager: Elen ([facility] 7:30AM-4PM,  7:30AM-4PM, Tu OFF)          Interval History:     Patient doing well. Voiding with mild dysuria and hematuria. Mild abdominal pain and back pain, has been taking oxycodone. Has been able to tolerate PO.           Medications:     Current Facility-Administered Medications   Medication Dose Route Frequency Provider Last Rate Last Admin    acetaminophen (TYLENOL) tablet 650 mg  650 mg Oral Q4H PRN Manish Rene MD   650 mg at 11/28/24 2048    Or    acetaminophen (TYLENOL) Suppository 650 mg  650 mg Rectal Q4H PRN Manish Rene MD        buPROPion (WELLBUTRIN XL) 24 hr tablet 300 mg  300 mg Oral Daily Manish Rene MD   300 mg at 11/29/24 0821    busPIRone (BUSPAR) tablet 5 mg  5 mg Oral TID PRN Manish Rene MD        calcium carbonate (TUMS) chewable tablet 1,000 mg  1,000 mg Oral 4x Daily PRN Manish Rene MD        cefTRIAXone (ROCEPHIN) 1 g vial to attach to  mL bag for ADULTS or NS 50 mL bag for PEDS  1 g Intravenous Q24H Manish Rene MD   1 g at 11/29/24 0556    escitalopram (LEXAPRO) tablet 20 mg  20 mg Oral  Daily Manish Rene MD   20 mg at 11/29/24 0821    fluticasone (FLONASE) 50 MCG/ACT spray 2 spray  2 spray Nasal Daily Manish Rene MD   2 spray at 11/29/24 0821    HYDROmorphone (PF) (DILAUDID) injection 0.3 mg  0.3 mg Intravenous Q3H PRN Manish Rene MD        hydrOXYzine HCl (ATARAX) tablet 25 mg  25 mg Oral Daily Manish Rene MD   25 mg at 11/29/24 0821    levocetirizine (XYZAL) tablet 5 mg  5 mg Oral Daily PRN Manish Rene MD        lidocaine (LMX4) cream   Topical Q1H PRN Manish Rene MD        lidocaine 1 % 0.1-1 mL  0.1-1 mL Other Q1H PRN Manish Rene MD        melatonin tablet 5 mg  5 mg Oral At Bedtime PRN Manish Rene MD        naloxone (NARCAN) injection 0.2 mg  0.2 mg Intravenous Q2 Min PRN Rufino Gomez DO        Or    naloxone (NARCAN) injection 0.4 mg  0.4 mg Intravenous Q2 Min PRN Rufino Gomez DO        Or    naloxone (NARCAN) injection 0.2 mg  0.2 mg Intramuscular Q2 Min PRN Rufino Gomez DO        Or    naloxone (NARCAN) injection 0.4 mg  0.4 mg Intramuscular Q2 Min PRN Rufino Gomez DO        oxyBUTYnin (DITROPAN) tablet 5 mg  5 mg Oral TID PRN Corinna Tran NP        oxyCODONE (ROXICODONE) tablet 5 mg  5 mg Oral Q4H PRN Manish Rene MD   5 mg at 11/29/24 0556    phenazopyridine (PYRIDIUM) tablet 100 mg  100 mg Oral TID PRN Corinna Tran NP        polyethylene glycol (MIRALAX) Packet 17 g  17 g Oral BID PRN Manish Rene MD        senna-docusate (SENOKOT-S/PERICOLACE) 8.6-50 MG per tablet 1 tablet  1 tablet Oral BID PRN Manish Rene MD        Or    senna-docusate (SENOKOT-S/PERICOLACE) 8.6-50 MG per tablet 2 tablet  2 tablet Oral BID PRN Manish Rene MD        sodium chloride (PF) 0.9% PF flush 3 mL  3 mL Intracatheter Q8H Manish Rene MD   3 mL at 11/29/24 0555    sodium chloride (PF) 0.9% PF flush 3 mL  3 mL Intracatheter q1 min prn Manish Rene MD        tamsulosin (FLOMAX) capsule 0.4 mg  0.4  "mg Oral Daily Chelsea Corinna DUFFY, NP   0.4 mg at 11/29/24 1140              Physical Exam:   Vitals were reviewed  Patient Vitals for the past 8 hrs:   BP Temp Temp src Pulse Resp SpO2   11/29/24 0822 109/72 98.1  F (36.7  C) Oral 75 12 97 %   11/29/24 0416 103/70 98.3  F (36.8  C) Oral 71 18 97 %     GEN: NAD, lying in bed  EYES: EOMI       Laboratory Results:   CBC RESULTS:  Recent Labs   Lab Test 11/29/24 0524 11/28/24 0118 07/19/24 0830 06/23/23  0751 08/01/22  1954   WBC 8.1 12.8* 7.1  --  16.1*   HGB 12.5 13.8 14.0 13.6 14.6    513* 458*  --  456*        BMP RESULTS:  Recent Labs   Lab Test 11/29/24 0524 11/28/24 0118 07/19/24 0830 06/23/23  0751 12/01/21  0759 03/30/21  1741 05/20/19  1148    139 139 142   < > 141 140   POTASSIUM 3.8 4.2 4.2 4.3   < > 4.4 4.3   CHLORIDE 109* 104 103 107   < > 105 107   CO2 25 19* 25 22   < > 25 21*   ANIONGAP 8 16* 11 13   < > 11 12   GLC 86 171* 94 92   < > 92 89   BUN 8.2 14.2 15.2 12.4   < > 12 12   CR 0.93 0.97* 0.97* 0.93   < > 0.86 0.75   GFRESTIMATED 79 75 75 80   < > >60 >60   GFRESTBLACK  --   --   --   --   --  >60 >60    < > = values in this interval not displayed.       CALCIUM RESULTS:  Recent Labs   Lab Test 11/29/24 0524 11/28/24 0118 07/19/24 0830 06/23/23  0751   SAL 8.4* 9.2 9.0 9.0       PTH RESULTS:  No results for input(s): \"PTHI\" in the last 50588 hours.    HGB A1C RESULTS:  No results found for: \"A1C\"    UA RESULTS:   Recent Labs   Lab Test 11/28/24  0322 08/01/22 2057 03/20/18  0820   SG 1.031* 1.018 >=1.030   URINEPH 6.5 8.0*  --    NITRITE Negative Negative  --    RBCU >182* 9*  --    WBCU 143* 22*  --        PSA RESULTS  No results found for: \"PSA\"    Recent Imaging Report    I personally reviewed all applicable imaging and went over the below findings with patient.    Results for orders placed or performed during the hospital encounter of 11/28/24   CT Abdomen Pelvis w Contrast    Narrative    EXAM: CT ABDOMEN PELVIS W " CONTRAST  LOCATION: [facility]  DATE: 11/28/2024    INDICATION: RLQ and flank pain  COMPARISON: None.  TECHNIQUE: CT scan of the abdomen and pelvis was performed following injection of IV contrast. Multiplanar reformats were obtained. Dose reduction techniques were used.  CONTRAST: ISOVUE 370 70ML    FINDINGS:   LOWER CHEST: Lung bases are clear.    HEPATOBILIARY: Gallbladder is slightly contracted.    PANCREAS: Normal.    SPLEEN: Normal.    ADRENAL GLANDS: Normal.    KIDNEYS/BLADDER: Several punctate right renal calculi. Mild right hydronephrosis and right ureteral dilatation. 3 mm distal right ureteral calculus.  2 mm left renal calculus.    BOWEL: Normal caliber. The colon is under distended    LYMPH NODES: Normal.    VASCULATURE: Normal.    PELVIC ORGANS: Normal.    MUSCULOSKELETAL: Mild degenerative change osseous structures.      Impression    IMPRESSION:   1.  Mild right hydronephrosis. 3 mm distal right ureteral calculus.  2.  Bilateral renal calculi.  3.  The colon is underdistended reducing evaluation for colitis.   XR Surgery BEBO Fluoro L/T 5 Min    Narrative    This exam was marked as non-reportable because it will not be read by a   radiologist or a [place] non-radiologist provider.            WDL

## 2024-11-29 NOTE — PLAN OF CARE
Problem: Adult Inpatient Plan of Care  Goal: Plan of Care Review  Description: The Plan of Care Review/Shift note should be completed every shift.  The Outcome Evaluation is a brief statement about your assessment that the patient is improving, declining, or no change.  This information will be displayed automatically on your shift  note.  Outcome: Progressing  Flowsheets (Taken 11/29/2024 0512)  Outcome Evaluation: pt up to bsc independently.  Plan of Care Reviewed With: patient  Overall Patient Progress: improving  Goal: Absence of Hospital-Acquired Illness or Injury  Outcome: Progressing  Intervention: Prevent Skin Injury  Recent Flowsheet Documentation  Taken 11/29/2024 0416 by Tello Capps RN  Body Position:   position changed independently   supine, head elevated  Taken 11/29/2024 0014 by Tello Capps RN  Body Position:   position changed independently   supine, head elevated  Intervention: Prevent and Manage VTE (Venous Thromboembolism) Risk  Recent Flowsheet Documentation  Taken 11/29/2024 0416 by Tello Capps RN  VTE Prevention/Management: SCDs on (sequential compression devices)  Taken 11/29/2024 0014 by Tello Capps RN  VTE Prevention/Management: SCDs on (sequential compression devices)  Intervention: Prevent Infection  Recent Flowsheet Documentation  Taken 11/29/2024 0416 by Tello Capps RN  Infection Prevention: hand hygiene promoted  Taken 11/29/2024 0014 by Tello Capps RN  Infection Prevention: hand hygiene promoted  Goal: Optimal Comfort and Wellbeing  Outcome: Progressing  Intervention: Monitor Pain and Promote Comfort  Recent Flowsheet Documentation  Taken 11/29/2024 0416 by Tello Capps RN  Pain Management Interventions:   rest   repositioned   relaxation techniques promoted  Taken 11/29/2024 0014 by Tello Capps RN  Pain Management Interventions:   rest   repositioned   relaxation techniques promoted   Goal Outcome Evaluation: pt is alert  and oriented. Up to the bathroom independently to bsc. Pt c/o minor flank pain. Voiding w/o difficulty!      Plan of Care Reviewed With: patient    Overall Patient Progress: improvingOverall Patient Progress: improving    Outcome Evaluation: pt up to bsc independently.

## 2024-11-29 NOTE — PLAN OF CARE
PRIMARY DIAGNOSIS: URETEROLITHIASIS  OUTPATIENT/OBSERVATION GOALS TO BE MET BEFORE DISCHARGE:  ADLs back to baseline: Yes    Activity and level of assistance: Ambulating independently.    Pain status: Pain free.    Return to near baseline physical activity: Yes     Discharge Planner Nurse   Safe discharge environment identified: Yes  Barriers to discharge: Yes       Entered by: Terri Lim RN 11/29/2024 9:41 AM     Please review provider order for any additional goals.   Nurse to notify provider when observation goals have been met and patient is ready for discharge.

## 2024-11-30 LAB
BACTERIA UR CULT: NORMAL
BACTERIA UR CULT: NORMAL

## 2024-12-02 ENCOUNTER — PREP FOR PROCEDURE (OUTPATIENT)
Dept: UROLOGY | Facility: CLINIC | Age: 39
End: 2024-12-02
Payer: COMMERCIAL

## 2024-12-02 ENCOUNTER — TELEPHONE (OUTPATIENT)
Dept: UROLOGY | Facility: CLINIC | Age: 39
End: 2024-12-02
Payer: COMMERCIAL

## 2024-12-02 ENCOUNTER — PATIENT OUTREACH (OUTPATIENT)
Dept: CARE COORDINATION | Facility: CLINIC | Age: 39
End: 2024-12-02
Payer: COMMERCIAL

## 2024-12-02 ENCOUNTER — MYC MEDICAL ADVICE (OUTPATIENT)
Dept: UROLOGY | Facility: CLINIC | Age: 39
End: 2024-12-02
Payer: COMMERCIAL

## 2024-12-02 NOTE — TELEPHONE ENCOUNTER
Dr Camargo will updated Pre op DOS    Stent placed 11/28    Spoke with:Patient       Date of surgery: Friday December 13 2024 with Dr Camargo      Location: [facility]      Informed patient they will need a adult : YES      Pre op with provider: Recent OR   Message sent to Dr Camargo to see if he is ok doing DOS       H&P Scheduled in PAC- NA        Pre procedure covid : Not req      Additional imaging: NA        Surgery Packet : Sent via [employer]       Additional comments: Please call for surgery teaching

## 2024-12-02 NOTE — TELEPHONE ENCOUNTER
[facility] Call Center    Phone Message    May a detailed message be left on voicemail: yes     Reason for Call: Patient is seeking clarification on care plan. Patient had recent procedure with Dr. Contreras and was told to follow-up this week. Please call patient.    Action Taken: Message routed to:  Other: [facility] - Urology    Travel Screening: Not Applicable     Date of Service:

## 2024-12-02 NOTE — PROGRESS NOTES
[facility]: Transitions of Care Outreach  Chief Complaint   Patient presents with    Clinic Care Coordination - Post Hospital       Most Recent Admission Date: 11/28/2024   Most Recent Admission Diagnosis: Ureterolithiasis - N20.1  Nausea - R11.0  RLQ abdominal pain - R10.31  Acute cystitis with hematuria - N30.01     Most Recent Discharge Date: 11/29/2024   Most Recent Discharge Diagnosis: Ureterolithiasis - N20.1  RLQ abdominal pain - R10.31  Nausea - R11.0  Acute cystitis with hematuria - N30.01  Right renal stone - N20.0     Transitions of Care Assessment    Discharge Assessment  How are you doing now that you are home?: I am doing good. Yesterday was worse but today I am feeling better. Patient was confused if she needed to have a second procedure or if she needed to follow up with Urology. Patient said she has a hard time remembering at discharge and on her discharge paperwork it only says to follow up with her PCP. Patient is going to contact someone to double check abd then she will call to follow up with her PCP.  How are your symptoms? (Red Flag symptoms escalate to triage hotline per guidelines): Improved  Do you know how to contact your clinic care team if you have future questions or changes to your health status? : Yes  Does the patient have their discharge instructions? : Yes  Does the patient have questions regarding their discharge instructions? : No  Were you started on any new medications or were there changes to any of your previous medications? : Yes  Does the patient have all of their medications?: No (see comment) (Pt did not  phenazopyridine because it is not covered by her insurance but she is taking the other 3 medications.)  Do you have questions regarding any of your medications? : No    Post-op ([facility] CTA Only)  If the patient had a surgery or procedure, do they have any questions for a nurse?: No         [facility] Explained and offered Care Coordination support to  eligible patients: No    Patient accepted? No    Unable to offer CC at this time.    Follow up Plan     Discharge Follow-Up  Discharge follow up appointment scheduled in alignment with recommended follow up timeframe or Transitions of Risk Category? (Low = within 30 days; Moderate= within 14 days; High= within 7 days): No  Patient's follow up appointment not scheduled: Patient declined scheduling support. Education on the importance of transitions of care follow up. Provided scheduling phone number.    No future appointments.    Outpatient Plan as outlined on AVS reviewed with patient.    For any urgent concerns, please contact our 24 hour nurse triage line: [phone] ([phone])       DMITRI Macario  [phone]  Connected Care Resource [facility]

## 2024-12-02 NOTE — PROGRESS NOTES
Connected Care Resource Center:   [facility] Resource Center Contact  [facility]/Voicemail     Clinical Data: Post-Discharge Outreach     Outreach attempted x 2.  Left message on patient's voicemail, providing [facility]'s central phone number of [phone] ([phone]) for questions/concerns and/or to schedule an appt with an [facility] provider, if they do not have a PCP.      Plan:  [facility] will do no further outreaches at this time.       DMITRI Iverson  Connected Care Resource [place], [facility]    *Connected Care Resource Team does NOT follow patient ongoing. Referrals are identified based on internal discharge reports and the outreach is to ensure patient has an understanding of their discharge instructions.

## 2024-12-04 ENCOUNTER — NURSE TRIAGE (OUTPATIENT)
Dept: NURSING | Facility: CLINIC | Age: 39
End: 2024-12-04
Payer: COMMERCIAL

## 2024-12-04 NOTE — TELEPHONE ENCOUNTER
Chart reviewed. Dr. Camargo will update pre-op DOS. On Cephalexin until 12/06 to treat ucx from ED.    BRET Ang  Care Coordinator- Urology   [phone]

## 2024-12-05 ASSESSMENT — PATIENT HEALTH QUESTIONNAIRE - PHQ9
10. IF YOU CHECKED OFF ANY PROBLEMS, HOW DIFFICULT HAVE THESE PROBLEMS MADE IT FOR YOU TO DO YOUR WORK, TAKE CARE OF THINGS AT HOME, OR GET ALONG WITH OTHER PEOPLE: SOMEWHAT DIFFICULT
SUM OF ALL RESPONSES TO PHQ QUESTIONS 1-9: 16
SUM OF ALL RESPONSES TO PHQ QUESTIONS 1-9: 16

## 2024-12-05 NOTE — TELEPHONE ENCOUNTER
Patient calling. She was hospitalized last week for a kidney stone that required surgery. She has second surgery set up for next Friday, but still feels like she has a UTI. The bleeding has gone down considerably, but she still has urgency, needing to urinate more frequently than once an hour.     Patient has a stent that was placed while she was hospitalized, which is most likely.    Pyridium was ordered for post-op care, but patient's insurance doesn't cover it. Patient was encouraged to  over-th-counter AZO. No triage.    Loretta Tapia RN  [facility]  December 4, 2024, 8:06 PM      Reason for Disposition   Health Information question, no triage required and triager able to answer question    Additional Information   Negative: [1] Caller is not with the adult (patient) AND [2] reporting urgent symptoms   Negative: Lab result questions   Negative: Medication questions   Negative: Caller can't be reached by phone   Negative: Caller has already spoken to PCP or another triager   Negative: RN needs further essential information from caller in order to complete triage   Negative: Requesting regular office appointment   Negative: [1] Caller requesting NON-URGENT health information AND [2] PCP's office is the best resource    Protocols used: Information Only Call - No Triage-[facility]

## 2024-12-06 ENCOUNTER — OFFICE VISIT (OUTPATIENT)
Dept: FAMILY MEDICINE | Facility: CLINIC | Age: 39
End: 2024-12-06
Payer: COMMERCIAL

## 2024-12-06 VITALS
WEIGHT: 153 LBS | RESPIRATION RATE: 20 BRPM | BODY MASS INDEX: 28.16 KG/M2 | OXYGEN SATURATION: 98 % | HEIGHT: 62 IN | SYSTOLIC BLOOD PRESSURE: 110 MMHG | DIASTOLIC BLOOD PRESSURE: 74 MMHG | HEART RATE: 69 BPM | TEMPERATURE: 98.1 F

## 2024-12-06 DIAGNOSIS — N20.0 RIGHT RENAL STONE: Primary | ICD-10-CM

## 2024-12-06 DIAGNOSIS — F90.0 ATTENTION DEFICIT HYPERACTIVITY DISORDER (ADHD), PREDOMINANTLY INATTENTIVE TYPE: ICD-10-CM

## 2024-12-06 DIAGNOSIS — Z09 HOSPITAL DISCHARGE FOLLOW-UP: ICD-10-CM

## 2024-12-06 PROCEDURE — 99495 TRANSJ CARE MGMT MOD F2F 14D: CPT | Performed by: FAMILY MEDICINE

## 2024-12-06 NOTE — PROGRESS NOTES
1. Right renal stone (Primary)  2. Hospital discharge follow-up  This is a 37 yo female with recent hospitalization for nephrolithiasis - right sided.  Reviewed CT scan which shows a few small stones in both kidneys.  She is uncomfortable, still, but stable.  Will have another intervention on Friday 12/13/2024 for management.  Continue Tamsulosin for symptom control.     I have reviewed available hospital records, consults, notes, discharge summary as well as imaging and lab results.  In addition I have reviewed her medication list and made any adjustments as indicated in orders.      - tamsulosin (FLOMAX) 0.4 MG capsule; Take 1 capsule (0.4 mg) by mouth daily.  Dispense: 7 capsule; Refill: 0    3. Attention deficit hyperactivity disorder (ADHD), predominantly inattentive type  ADHD - stable with occasional Adderall therapy - refilled.    - amphetamine-dextroamphetamine (ADDERALL) 10 MG tablet; Take 1 tablet (10 mg) by mouth daily. In the afternoon  Dispense: 30 tablet; Refill: 0        Sabas Rojo is a 38 year old, presenting for the following health issues:  Hospital F/U        12/6/2024     7:30 AM   Additional Questions   Roomed by Dany KESSLER   Accompanied by self     Had kidney stone, hydronephrosis, stenting  Next Friday - stone removal and stent    Still feels period cramps and raging UTI  Next Friday, 12/13, goes in for surgery    Per record:  KIDNEYS/BLADDER: Several punctate right renal calculi. Mild right hydronephrosis and right ureteral dilatation. 3 mm distal right ureteral calculus.  2 mm left renal calculus.              Hospital Follow-up Visit:    Hospital/Nursing Home/IP Rehab Facility: [facility]  Date of Admission: 11/28/2024  Date of Discharge: 11/29/2024  Reason(s) for Admission: Kidney stone infection  Was the patient in the ICU or did the patient experience delirium during hospitalization?  No  Do you have any other stressors you would like to discuss with  "your provider? No    Problems taking medications regularly:  None  Medication changes since discharge: None  Problems adhering to non-medication therapy:  None    Summary of hospitalization:  [facility] discharge summary reviewed  Diagnostic Tests/Treatments reviewed.  Follow up needed: none  Other Healthcare Providers Involved in Patient s Care:         Specialist appointment - Urology (kidney stone)  Update since discharge: improved.         Plan of care communicated with patient               Review of Systems   Constitutional:  Negative for chills and fever.   HENT:  Negative for ear pain and sore throat.    Eyes:  Negative for pain and visual disturbance.   Respiratory:  Negative for cough and shortness of breath.    Cardiovascular:  Negative for chest pain and palpitations.   Gastrointestinal:  Negative for abdominal pain and vomiting.   Genitourinary:  Positive for flank pain (right sided) and urgency. Negative for dysuria and hematuria.   Musculoskeletal:  Negative for arthralgias and back pain.   Skin:  Negative for color change and rash.   Neurological:  Negative for seizures and syncope.   All other systems reviewed and are negative.          Objective    /74 (BP Location: Right arm, Patient Position: Sitting, Cuff Size: Adult Regular)   Pulse 69   Temp 98.1  F (36.7  C) (Oral)   Resp 20   Ht 1.581 m (5' 2.24\")   Wt 69.4 kg (153 lb)   SpO2 98%   BMI 27.77 kg/m    Body mass index is 27.77 kg/m .  Physical Exam  Vitals reviewed.   Constitutional:       General: She is not in acute distress.     Appearance: Normal appearance.   HENT:      Head: Normocephalic.      Right Ear: Tympanic membrane, ear canal and external ear normal.      Left Ear: Tympanic membrane, ear canal and external ear normal.      Nose: Nose normal.      Mouth/Throat:      Mouth: Mucous membranes are moist.      Pharynx: No posterior oropharyngeal erythema.   Eyes:      Extraocular Movements: Extraocular " movements intact.      Conjunctiva/sclera: Conjunctivae normal.      Pupils: Pupils are equal, round, and reactive to light.   Cardiovascular:      Rate and Rhythm: Normal rate and regular rhythm.      Pulses: Normal pulses.      Heart sounds: Normal heart sounds. No murmur heard.  Pulmonary:      Effort: Pulmonary effort is normal.      Breath sounds: Normal breath sounds.   Abdominal:      Palpations: Abdomen is soft. There is no mass.      Tenderness: There is abdominal tenderness (mild suprapubic tenderness). There is right CVA tenderness. There is no guarding or rebound.   Musculoskeletal:         General: No deformity. Normal range of motion.      Cervical back: Normal range of motion and neck supple.   Lymphadenopathy:      Cervical: No cervical adenopathy.   Skin:     General: Skin is warm and dry.   Neurological:      General: No focal deficit present.      Mental Status: She is alert.   Psychiatric:         Mood and Affect: Mood normal.         Behavior: Behavior normal.            No results found for any visits on 12/06/24.        Signed Electronically by: MIKE FERNANDEZ MD      Prior to immunization administration, verified patients identity using patient s name and date of birth. Please see Immunization Activity for additional information.     Screening Questionnaire for Adult Immunization    Are you sick today?   No   Do you have allergies to medications, food, a vaccine component or latex?   Yes   Have you ever had a serious reaction after receiving a vaccination?   No   Do you have a long-term health problem with heart, lung, kidney, or metabolic disease (e.g., diabetes), asthma, a blood disorder, no spleen, complement component deficiency, a cochlear implant, or a spinal fluid leak?  Are you on long-term aspirin therapy?   No   Do you have cancer, leukemia, HIV/AIDS, or any other immune system problem?   No   Do you have a parent, brother, or sister with an immune system problem?    No   In the past 3 months, have you taken medications that affect  your immune system, such as prednisone, other steroids, or anticancer drugs; drugs for the treatment of rheumatoid arthritis, Crohn s disease, or psoriasis; or have you had radiation treatments?   No   Have you had a seizure, or a brain or other nervous system problem?   No   During the past year, have you received a transfusion of blood or blood    products, or been given immune (gamma) globulin or antiviral drug?   No   For women: Are you pregnant or is there a chance you could become       pregnant during the next month?   No   Have you received any vaccinations in the past 4 weeks?   No     Immunization questionnaire was positive for at least one answer.  Notified provider.      Patient instructed to remain in clinic for 15 minutes afterwards, and to report any adverse reactions.     Screening performed by Dany Sanon MA on 12/6/2024 at 7:35 AM.        Answers submitted by the patient for this visit:  Patient Health Questionnaire (Submitted on 12/5/2024)  If you checked off any problems, how difficult have these problems made it for you to do your work, take care of things at home, or get along with other people?: Somewhat difficult  PHQ9 TOTAL SCORE: 16

## 2024-12-09 ENCOUNTER — MYC REFILL (OUTPATIENT)
Dept: FAMILY MEDICINE | Facility: CLINIC | Age: 39
End: 2024-12-09
Payer: COMMERCIAL

## 2024-12-09 DIAGNOSIS — N20.0 RIGHT RENAL STONE: ICD-10-CM

## 2024-12-09 RX ORDER — DEXTROAMPHETAMINE SACCHARATE, AMPHETAMINE ASPARTATE, DEXTROAMPHETAMINE SULFATE AND AMPHETAMINE SULFATE 2.5; 2.5; 2.5; 2.5 MG/1; MG/1; MG/1; MG/1
10 TABLET ORAL DAILY
Qty: 30 TABLET | Refills: 0 | Status: SHIPPED | OUTPATIENT
Start: 2024-12-09

## 2024-12-09 RX ORDER — OXYBUTYNIN CHLORIDE 5 MG/1
5 TABLET ORAL 3 TIMES DAILY PRN
Qty: 18 TABLET | Refills: 0 | Status: SHIPPED | OUTPATIENT
Start: 2024-12-09

## 2024-12-09 RX ORDER — TAMSULOSIN HYDROCHLORIDE 0.4 MG/1
0.4 CAPSULE ORAL DAILY
Qty: 7 CAPSULE | Refills: 0 | Status: SHIPPED | OUTPATIENT
Start: 2024-12-09

## 2024-12-09 ASSESSMENT — ENCOUNTER SYMPTOMS
VOMITING: 0
SEIZURES: 0
SHORTNESS OF BREATH: 0
COUGH: 0
PALPITATIONS: 0
COLOR CHANGE: 0
ABDOMINAL PAIN: 0
HEMATURIA: 0
EYE PAIN: 0
SORE THROAT: 0
ARTHRALGIAS: 0
FLANK PAIN: 1
FEVER: 0
DYSURIA: 0
CHILLS: 0
BACK PAIN: 0

## 2024-12-12 ENCOUNTER — TELEPHONE (OUTPATIENT)
Dept: NURSING | Facility: CLINIC | Age: 39
End: 2024-12-12
Payer: COMMERCIAL

## 2024-12-13 ENCOUNTER — HOSPITAL ENCOUNTER (OUTPATIENT)
Facility: AMBULATORY SURGERY CENTER | Age: 39
Discharge: HOME | End: 2024-12-13
Attending: UROLOGY
Payer: COMMERCIAL

## 2024-12-13 VITALS
HEART RATE: 80 BPM | OXYGEN SATURATION: 98 % | TEMPERATURE: 97.6 F | DIASTOLIC BLOOD PRESSURE: 67 MMHG | BODY MASS INDEX: 27.6 KG/M2 | SYSTOLIC BLOOD PRESSURE: 100 MMHG | WEIGHT: 150 LBS | HEIGHT: 62 IN | RESPIRATION RATE: 16 BRPM

## 2024-12-13 DIAGNOSIS — N20.1 URETEROLITHIASIS: ICD-10-CM

## 2024-12-13 PROCEDURE — 99000 SPECIMEN HANDLING OFFICE-LAB: CPT | Performed by: INTERNAL MEDICINE

## 2024-12-13 PROCEDURE — 52352 CYSTOURETERO W/STONE REMOVE: CPT | Mod: RT | Performed by: UROLOGY

## 2024-12-13 PROCEDURE — 82365 CALCULUS SPECTROSCOPY: CPT | Mod: 90 | Performed by: INTERNAL MEDICINE

## 2024-12-13 PROCEDURE — 74420 UROGRAPHY RTRGR +-KUB: CPT | Mod: 26 | Performed by: UROLOGY

## 2024-12-13 RX ORDER — SODIUM CHLORIDE, SODIUM LACTATE, POTASSIUM CHLORIDE, CALCIUM CHLORIDE 600; 310; 30; 20 MG/100ML; MG/100ML; MG/100ML; MG/100ML
INJECTION, SOLUTION INTRAVENOUS CONTINUOUS
Status: DISCONTINUED | OUTPATIENT
Start: 2024-12-13 | End: 2024-12-14 | Stop reason: HOSPADM

## 2024-12-13 RX ORDER — CEFAZOLIN SODIUM 2 G/100ML
2 INJECTION, SOLUTION INTRAVENOUS SEE ADMIN INSTRUCTIONS
Status: DISCONTINUED | OUTPATIENT
Start: 2024-12-13 | End: 2024-12-14 | Stop reason: HOSPADM

## 2024-12-13 RX ORDER — ACETAMINOPHEN 325 MG/1
975 TABLET ORAL ONCE
Status: COMPLETED | OUTPATIENT
Start: 2024-12-13 | End: 2024-12-13

## 2024-12-13 RX ORDER — DEXAMETHASONE SODIUM PHOSPHATE 4 MG/ML
4 INJECTION, SOLUTION INTRA-ARTICULAR; INTRALESIONAL; INTRAMUSCULAR; INTRAVENOUS; SOFT TISSUE
Status: DISCONTINUED | OUTPATIENT
Start: 2024-12-13 | End: 2024-12-14 | Stop reason: HOSPADM

## 2024-12-13 RX ORDER — LIDOCAINE 40 MG/G
CREAM TOPICAL
Status: DISCONTINUED | OUTPATIENT
Start: 2024-12-13 | End: 2024-12-14 | Stop reason: HOSPADM

## 2024-12-13 RX ORDER — NALOXONE HYDROCHLORIDE 0.4 MG/ML
0.1 INJECTION, SOLUTION INTRAMUSCULAR; INTRAVENOUS; SUBCUTANEOUS
Status: DISCONTINUED | OUTPATIENT
Start: 2024-12-13 | End: 2024-12-14 | Stop reason: HOSPADM

## 2024-12-13 RX ORDER — OXYCODONE HYDROCHLORIDE 5 MG/1
5 TABLET ORAL
Status: DISCONTINUED | OUTPATIENT
Start: 2024-12-13 | End: 2024-12-14 | Stop reason: HOSPADM

## 2024-12-13 RX ORDER — HYDROMORPHONE HCL IN WATER/PF 6 MG/30 ML
0.2 PATIENT CONTROLLED ANALGESIA SYRINGE INTRAVENOUS EVERY 5 MIN PRN
Status: CANCELLED | OUTPATIENT
Start: 2024-12-13

## 2024-12-13 RX ORDER — FENTANYL CITRATE 0.05 MG/ML
25 INJECTION, SOLUTION INTRAMUSCULAR; INTRAVENOUS EVERY 5 MIN PRN
Status: DISCONTINUED | OUTPATIENT
Start: 2024-12-13 | End: 2024-12-14 | Stop reason: HOSPADM

## 2024-12-13 RX ORDER — CEFAZOLIN SODIUM 2 G/100ML
2 INJECTION, SOLUTION INTRAVENOUS
Status: COMPLETED | OUTPATIENT
Start: 2024-12-13 | End: 2024-12-13

## 2024-12-13 RX ORDER — ACETAMINOPHEN 650 MG/1
650 SUPPOSITORY RECTAL ONCE
Status: COMPLETED | OUTPATIENT
Start: 2024-12-13 | End: 2024-12-13

## 2024-12-13 RX ORDER — ONDANSETRON 4 MG/1
4 TABLET, ORALLY DISINTEGRATING ORAL EVERY 30 MIN PRN
Status: DISCONTINUED | OUTPATIENT
Start: 2024-12-13 | End: 2024-12-14 | Stop reason: HOSPADM

## 2024-12-13 RX ORDER — HYDROMORPHONE HCL IN WATER/PF 6 MG/30 ML
0.4 PATIENT CONTROLLED ANALGESIA SYRINGE INTRAVENOUS EVERY 5 MIN PRN
Status: CANCELLED | OUTPATIENT
Start: 2024-12-13

## 2024-12-13 RX ORDER — FENTANYL CITRATE 0.05 MG/ML
50 INJECTION, SOLUTION INTRAMUSCULAR; INTRAVENOUS EVERY 5 MIN PRN
Status: DISCONTINUED | OUTPATIENT
Start: 2024-12-13 | End: 2024-12-14 | Stop reason: HOSPADM

## 2024-12-13 RX ORDER — OXYCODONE HYDROCHLORIDE 10 MG/1
10 TABLET ORAL
Status: DISCONTINUED | OUTPATIENT
Start: 2024-12-13 | End: 2024-12-14 | Stop reason: HOSPADM

## 2024-12-13 RX ORDER — ONDANSETRON 2 MG/ML
4 INJECTION INTRAMUSCULAR; INTRAVENOUS EVERY 30 MIN PRN
Status: DISCONTINUED | OUTPATIENT
Start: 2024-12-13 | End: 2024-12-14 | Stop reason: HOSPADM

## 2024-12-13 RX ORDER — SCOLOPAMINE TRANSDERMAL SYSTEM 1 MG/1
1 PATCH, EXTENDED RELEASE TRANSDERMAL
Status: DISCONTINUED | OUTPATIENT
Start: 2024-12-13 | End: 2024-12-14 | Stop reason: HOSPADM

## 2024-12-13 RX ADMIN — SCOLOPAMINE TRANSDERMAL SYSTEM 1 PATCH: 1 PATCH, EXTENDED RELEASE TRANSDERMAL at 13:54

## 2024-12-13 RX ADMIN — FENTANYL CITRATE 25 MCG: 0.05 INJECTION, SOLUTION INTRAMUSCULAR; INTRAVENOUS at 14:49

## 2024-12-13 RX ADMIN — FENTANYL CITRATE 25 MCG: 0.05 INJECTION, SOLUTION INTRAMUSCULAR; INTRAVENOUS at 14:57

## 2024-12-13 RX ADMIN — ACETAMINOPHEN 975 MG: 325 TABLET ORAL at 13:04

## 2024-12-13 NOTE — DISCHARGE INSTRUCTIONS
If you have any questions or concerns regarding your procedure, please contact Dr. Camargo, his office number is [phone].     You received 975 mg of acetaminophen (Tylenol) at 1:04 PM. Please do not take an additional dose of Tylenol until after 7:04 PM.    Do not exceed 4,000 mg of acetaminophen in a 24 hour period, keep in mind that acetaminophen can also be found in many over-the-counter cold medications as well as narcotics that may be given for pain.     You received a medication called Toradol (a stronger IV ibuprofen) at 2:28 PM. Do NOT take any Ibuprofen / Advil / Motrin / Aleve / Naproxen or products containing Ibuprofen until 8:28 PM or later.     Please remove Scopalamine patch, placed behind one of your ears prior to surgery, within 72 hours after your procedure. This patch is used to treat nausea. Wash your hands thoroughly after patch removal and do not touch eyes afterwards.     [facility]   Adult Discharge Orders & Instructions     For 24 hours after surgery    Get plenty of rest.  A responsible adult must stay with you for at least 24 hours after you leave the hospital.   Do not drive or use heavy equipment.  If you have weakness or tingling, don't drive or use heavy equipment until this feeling goes away.  Do not drink alcohol.  Avoid strenuous or risky activities.  Ask for help when climbing stairs.   You may feel lightheaded.  IF so, sit for a few minutes before standing.  Have someone help you get up.   If you have nausea (feel sick to your stomach): Drink only clear liquids such as apple juice, ginger ale, broth or 7-Up.  Rest may also help.  Be sure to drink enough fluids.  Move to a regular diet as you feel able.  You may have a slight fever. Call the doctor if your fever is over 100 F (37.7 C) (taken under the tongue) or lasts longer than 24 hours.  You may have a dry mouth, a sore throat, muscle aches or trouble sleeping.  These should go away after 24 hours.  Do  not make important or legal decisions.   Call your doctor for any of the followin.  Signs of infection (fever, growing tenderness at the surgery site, a large amount of drainage or bleeding, severe pain, foul-smelling drainage, redness, swelling).    2. It has been over 8 to 10 hours since surgery and you are still not able to urinate (pass water).    3.  Headache for over 24 hours.

## 2024-12-13 NOTE — TELEPHONE ENCOUNTER
Telephone Encounter:    Patient calling for information regarding her scheduled surgery for tomorrow. She did not receive surgery instruction. Unable to locate instruction in [facility].    Transferred call to page on-call provider (#[phone]), Dr Denise at [phone].    No additional concerns or questions.    Evette Christianson RN  12/12/24 7:00 PM  [facility] Nurse Advisor

## 2024-12-13 NOTE — PROGRESS NOTES
I personally met with Alia Wilson and discussed their current medical situation.     We reviewed the nature of planned surgery, the risks benefits and complications and treatment alternatives.      Shared decision made to proceed with right ureteroscopic stone treatment   no

## 2024-12-13 NOTE — OP NOTE
PREOPERATIVE DIAGNOSIS:  Right ureteral stone   POSTOPERATIVE DIAGNOSIS: Same  PROCEDURES PERFORMED:    Cystoscopy  Right ureteroscopy with stone basket extraction of renal and ureteral stone  Right retrograde pyelogram with interpretation of imaging  Right ureteral stent removal    STAFF SURGEON: Tello Camargo MD  RESIDENT(S) none present    ANESTHESIA: General  ESTIMATED BLOOD LOSS: 1 ml  COMPLICATIONS: None  SPECIMEN: Right ureteral stone for analysis  URETERAL STENT(S): None    SIGNIFICANT FINDINGS: Right ureteral stone    BRIEF OPERATIVE INDICATIONS: Patient presented with right ureteral stone that previously underwent ureteral stent placement for obstruction..  After a discussion of all risks, benefits, and alternatives, the patient elected to proceed with definitive stone management. The patient understands the potential need for more than one procedure to eliminate all stone burden.      DESCRIPTION OF PROCEDURE:  After informed consent was obtained, the patient was transported to the operating room & placed supine on the table. After adequate anesthesia was induced, the patient was placed in lithotomy and prepped and draped in the usual sterile fashion. A timeout was taken to confirm correct patient, procedure and laterality. Pre-operative IV antibiotics were administered.     We started the procedure by performing cystoscopy.  The bladder was somewhat unremarkable and the media had some old blood consistent with inflammation from the stent.  The stent was identified grasped and removed intact.  We passed the flexible ureteroscope through the right ureteral orifice and identified the stone in the mid to distal ureter.  It appeared to be calcium oxalate.  Fortunately the ureter was sufficiently calibrated and dilated such that we were able to directly basket extract the stone without any resistance.  We stent the stone for analysis.  We passed the flexible ureteroscope back up the ureter and into the  right kidney.  We perform nephroscopy meticulously inspecting each calyx.  Of note, the renal papillae were very heavily calcified with abundant Eddi's plaque.  We did identify 1 small overgrowth stone in a midpole papilla.  We basket extracted it from the kidney.  The remainder of the kidney was unremarkable for stone.  A retrograde pyelogram showed no extravasation.  The kidney drained quite rapidly and spontaneously given the fact that it was prestented.  In the absence of any significant ureteral trauma or excessive manipulation or instrumentation we elected not to replace the stent.    The bladder was drained and the patient was awoken from anesthesia and transported to the postanesthesia care unit in stable condition.     POSTOP PLAN:  Follow-up 2 to 3 months with imaging

## 2024-12-13 NOTE — PROGRESS NOTES
Offered patient pregnancy test.  Explained there are risks associated with anesthesia and pregnancy.  Patient verbalizes understanding, denies possibility of pregnancy and declines pregnancy test.    Kendal Mcnulty RN on 12/13/2024 at 1:12 PM

## 2024-12-16 ENCOUNTER — TELEPHONE (OUTPATIENT)
Dept: UROLOGY | Facility: CLINIC | Age: 39
End: 2024-12-16
Payer: COMMERCIAL

## 2024-12-16 LAB
APPEARANCE STONE: NORMAL
COMPN STONE: NORMAL
SPECIMEN WT: 34 MG

## 2024-12-16 NOTE — TELEPHONE ENCOUNTER
Spoke with patient regarding her symptoms. She has some tingling on the tip of her tongue and some numbness on the inside of her lip.  No issues breathing or swallowing, she is able to eat and drink without problem.  Discussed possibly that the tubing from anesthesia may have caused this and it should resolve in time.  Advised patient that if it worsens or she has any issues with swallowing or breathing, she should go in to be evaluated.  Patient agrees with plan.  Nancy Weaver RN

## 2024-12-16 NOTE — TELEPHONE ENCOUNTER
[facility] Call Center    Phone Message    May a detailed message be left on voicemail: yes     Reason for Call: Other: pt calling has had numbness on her lip and tongue since her surgery and not sure why, please reach out to pt     Action Taken: Other: urology    Travel Screening: Not Applicable     Date of Service:

## 2024-12-18 ENCOUNTER — PATIENT OUTREACH (OUTPATIENT)
Dept: CARE COORDINATION | Facility: CLINIC | Age: 39
End: 2024-12-18
Payer: COMMERCIAL

## 2025-02-05 ENCOUNTER — MYC REFILL (OUTPATIENT)
Dept: FAMILY MEDICINE | Facility: CLINIC | Age: 40
End: 2025-02-05
Payer: COMMERCIAL

## 2025-02-05 DIAGNOSIS — K59.04 CHRONIC IDIOPATHIC CONSTIPATION: ICD-10-CM

## 2025-02-05 DIAGNOSIS — F41.1 GAD (GENERALIZED ANXIETY DISORDER): ICD-10-CM

## 2025-02-05 DIAGNOSIS — F90.0 ATTENTION DEFICIT HYPERACTIVITY DISORDER (ADHD), PREDOMINANTLY INATTENTIVE TYPE: ICD-10-CM

## 2025-02-07 ENCOUNTER — MYC MEDICAL ADVICE (OUTPATIENT)
Dept: FAMILY MEDICINE | Facility: CLINIC | Age: 40
End: 2025-02-07
Payer: COMMERCIAL

## 2025-02-07 DIAGNOSIS — R05.8 POST-VIRAL COUGH SYNDROME: Primary | ICD-10-CM

## 2025-02-13 RX ORDER — PREDNISONE 20 MG/1
40 TABLET ORAL DAILY
Qty: 10 TABLET | Refills: 0 | Status: SHIPPED | OUTPATIENT
Start: 2025-02-13 | End: 2025-02-18

## 2025-02-13 RX ORDER — BENZONATATE 200 MG/1
200 CAPSULE ORAL 3 TIMES DAILY PRN
Qty: 30 CAPSULE | Refills: 0 | Status: SHIPPED | OUTPATIENT
Start: 2025-02-13

## 2025-02-13 RX ORDER — ALBUTEROL SULFATE 90 UG/1
2 INHALANT RESPIRATORY (INHALATION) EVERY 4 HOURS PRN
Qty: 18 G | Refills: 1 | Status: SHIPPED | OUTPATIENT
Start: 2025-02-13

## 2025-02-18 ENCOUNTER — OFFICE VISIT (OUTPATIENT)
Dept: FAMILY MEDICINE | Facility: CLINIC | Age: 40
End: 2025-02-18
Payer: COMMERCIAL

## 2025-02-18 ENCOUNTER — ANCILLARY PROCEDURE (OUTPATIENT)
Dept: MAMMOGRAPHY | Facility: CLINIC | Age: 40
End: 2025-02-18
Attending: FAMILY MEDICINE
Payer: COMMERCIAL

## 2025-02-18 VITALS
HEIGHT: 62 IN | DIASTOLIC BLOOD PRESSURE: 77 MMHG | RESPIRATION RATE: 16 BRPM | OXYGEN SATURATION: 96 % | BODY MASS INDEX: 27.79 KG/M2 | TEMPERATURE: 97.2 F | WEIGHT: 151 LBS | HEART RATE: 84 BPM | SYSTOLIC BLOOD PRESSURE: 113 MMHG

## 2025-02-18 DIAGNOSIS — G89.29 CHRONIC RIGHT-SIDED THORACIC BACK PAIN: Primary | ICD-10-CM

## 2025-02-18 DIAGNOSIS — R20.0 NUMBNESS AND TINGLING IN RIGHT HAND: ICD-10-CM

## 2025-02-18 DIAGNOSIS — Z12.31 ENCOUNTER FOR SCREENING MAMMOGRAM FOR BREAST CANCER: ICD-10-CM

## 2025-02-18 DIAGNOSIS — M54.6 CHRONIC RIGHT-SIDED THORACIC BACK PAIN: Primary | ICD-10-CM

## 2025-02-18 DIAGNOSIS — R20.0 NUMBNESS OF RIGHT FOOT: ICD-10-CM

## 2025-02-18 DIAGNOSIS — R20.2 NUMBNESS AND TINGLING IN RIGHT HAND: ICD-10-CM

## 2025-02-18 DIAGNOSIS — M41.24 OTHER IDIOPATHIC SCOLIOSIS, THORACIC REGION: ICD-10-CM

## 2025-02-18 PROCEDURE — 77063 BREAST TOMOSYNTHESIS BI: CPT | Mod: TC | Performed by: RADIOLOGY

## 2025-02-18 PROCEDURE — 99214 OFFICE O/P EST MOD 30 MIN: CPT | Performed by: FAMILY MEDICINE

## 2025-02-18 PROCEDURE — G2211 COMPLEX E/M VISIT ADD ON: HCPCS | Performed by: FAMILY MEDICINE

## 2025-02-18 PROCEDURE — 77067 SCR MAMMO BI INCL CAD: CPT | Mod: TC | Performed by: RADIOLOGY

## 2025-02-18 RX ORDER — DEXTROAMPHETAMINE SACCHARATE, AMPHETAMINE ASPARTATE, DEXTROAMPHETAMINE SULFATE AND AMPHETAMINE SULFATE 2.5; 2.5; 2.5; 2.5 MG/1; MG/1; MG/1; MG/1
10 TABLET ORAL DAILY
Qty: 30 TABLET | Refills: 0 | Status: SHIPPED | OUTPATIENT
Start: 2025-02-18

## 2025-02-18 RX ORDER — BUPROPION HYDROCHLORIDE 300 MG/1
300 TABLET ORAL DAILY
Qty: 90 TABLET | Refills: 0 | Status: SHIPPED | OUTPATIENT
Start: 2025-02-18

## 2025-02-18 RX ORDER — ESCITALOPRAM OXALATE 20 MG/1
20 TABLET ORAL DAILY
Qty: 90 TABLET | Refills: 1 | Status: SHIPPED | OUTPATIENT
Start: 2025-02-18

## 2025-02-18 RX ORDER — LEVOCETIRIZINE DIHYDROCHLORIDE 5 MG/1
5 TABLET, FILM COATED ORAL DAILY PRN
OUTPATIENT
Start: 2025-02-18

## 2025-02-18 RX ORDER — FLUTICASONE PROPIONATE 50 MCG
2 SPRAY, SUSPENSION (ML) NASAL DAILY
OUTPATIENT
Start: 2025-02-18

## 2025-02-18 RX ORDER — POLYETHYLENE GLYCOL 3350 17 G/17G
1 POWDER, FOR SOLUTION ORAL DAILY
Qty: 1530 G | Refills: 0 | Status: SHIPPED | OUTPATIENT
Start: 2025-02-18

## 2025-02-18 ASSESSMENT — ENCOUNTER SYMPTOMS
CHILLS: 0
DYSURIA: 0
SORE THROAT: 0
COLOR CHANGE: 0
HEADACHES: 1
SHORTNESS OF BREATH: 0
ARTHRALGIAS: 0
COUGH: 0
EYE PAIN: 0
ABDOMINAL PAIN: 0
VOMITING: 0
HEMATURIA: 0
PALPITATIONS: 0
SEIZURES: 0
NECK PAIN: 1
BACK PAIN: 1
FEVER: 0

## 2025-02-18 ASSESSMENT — PATIENT HEALTH QUESTIONNAIRE - PHQ9
SUM OF ALL RESPONSES TO PHQ QUESTIONS 1-9: 8
SUM OF ALL RESPONSES TO PHQ QUESTIONS 1-9: 8
10. IF YOU CHECKED OFF ANY PROBLEMS, HOW DIFFICULT HAVE THESE PROBLEMS MADE IT FOR YOU TO DO YOUR WORK, TAKE CARE OF THINGS AT HOME, OR GET ALONG WITH OTHER PEOPLE: SOMEWHAT DIFFICULT
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
## 2025-02-18 NOTE — TELEPHONE ENCOUNTER
Writer spoke to pt to follow-up on refills for the following medications:   Flonase was prescribed b ENT- still following pcp  Levozetirizine    FINDINGS: Pt confirmed medications were originally prescribed by ENT provider who pt is currently seeing.     PLAN: Will defer refills to ENT to follow-up. Pt agreed with plan and will call ENT.   ____________________________________________________________________    Routing to Dr Regan to review for refill approval.     ACTION ASKED OF CLINICIAN: Please review pended meds and approve if agreed. Last visit was: 12/6/2024    3. Polyethyene glycol (MIRALAX) 17 gm/dose powder: prescribed by Dr Regan for IDS  4. Escitalopram (LEXAPRO)20MG   5. Bupropion (Wellbutin XL)  300mg  prescribed by Dr Regan (recent refill auth by Dr Guevara)  6. Amphetamine-dextroamphetamine (ADDERALL) 10mg prescribed by Dr Shereen PRO, RN  [facility]

## 2025-02-19 ENCOUNTER — PATIENT OUTREACH (OUTPATIENT)
Dept: CARE COORDINATION | Facility: CLINIC | Age: 40
End: 2025-02-19
Payer: COMMERCIAL

## 2025-03-11 ENCOUNTER — HOSPITAL ENCOUNTER (OUTPATIENT)
Dept: RADIOLOGY | Facility: CLINIC | Age: 40
Discharge: HOME | End: 2025-03-11
Attending: NURSE PRACTITIONER | Admitting: NURSE PRACTITIONER
Payer: COMMERCIAL

## 2025-03-11 ENCOUNTER — OFFICE VISIT (OUTPATIENT)
Dept: PHYSICAL MEDICINE AND REHAB | Facility: CLINIC | Age: 40
End: 2025-03-11
Attending: FAMILY MEDICINE
Payer: COMMERCIAL

## 2025-03-11 VITALS
BODY MASS INDEX: 27.79 KG/M2 | HEART RATE: 78 BPM | DIASTOLIC BLOOD PRESSURE: 86 MMHG | SYSTOLIC BLOOD PRESSURE: 138 MMHG | WEIGHT: 151 LBS | HEIGHT: 62 IN

## 2025-03-11 DIAGNOSIS — R20.0 NUMBNESS OF RIGHT FOOT: ICD-10-CM

## 2025-03-11 DIAGNOSIS — G89.29 CHRONIC RIGHT-SIDED LOW BACK PAIN WITH RIGHT-SIDED SCIATICA: ICD-10-CM

## 2025-03-11 DIAGNOSIS — M54.6 CHRONIC RIGHT-SIDED THORACIC BACK PAIN: ICD-10-CM

## 2025-03-11 DIAGNOSIS — G89.29 CHRONIC RIGHT-SIDED THORACIC BACK PAIN: ICD-10-CM

## 2025-03-11 DIAGNOSIS — M54.12 RIGHT CERVICAL RADICULOPATHY: Primary | ICD-10-CM

## 2025-03-11 DIAGNOSIS — R20.0 NUMBNESS AND TINGLING IN RIGHT HAND: ICD-10-CM

## 2025-03-11 DIAGNOSIS — M54.41 CHRONIC RIGHT-SIDED LOW BACK PAIN WITH RIGHT-SIDED SCIATICA: ICD-10-CM

## 2025-03-11 DIAGNOSIS — R20.2 NUMBNESS AND TINGLING IN RIGHT HAND: ICD-10-CM

## 2025-03-11 PROCEDURE — 3075F SYST BP GE 130 - 139MM HG: CPT | Performed by: NURSE PRACTITIONER

## 2025-03-11 PROCEDURE — 72100 X-RAY EXAM L-S SPINE 2/3 VWS: CPT

## 2025-03-11 PROCEDURE — 72070 X-RAY EXAM THORAC SPINE 2VWS: CPT

## 2025-03-11 PROCEDURE — 1125F AMNT PAIN NOTED PAIN PRSNT: CPT | Performed by: NURSE PRACTITIONER

## 2025-03-11 PROCEDURE — 3079F DIAST BP 80-89 MM HG: CPT | Performed by: NURSE PRACTITIONER

## 2025-03-11 PROCEDURE — 99204 OFFICE O/P NEW MOD 45 MIN: CPT | Performed by: NURSE PRACTITIONER

## 2025-03-11 RX ORDER — CYCLOBENZAPRINE HCL 5 MG
5-10 TABLET ORAL 3 TIMES DAILY PRN
Qty: 30 TABLET | Refills: 3 | Status: SHIPPED | OUTPATIENT
Start: 2025-03-11

## 2025-03-11 RX ORDER — NABUMETONE 500 MG/1
500-1000 TABLET, FILM COATED ORAL 2 TIMES DAILY PRN
Qty: 30 TABLET | Refills: 3 | Status: SHIPPED | OUTPATIENT
Start: 2025-03-11

## 2025-03-11 ASSESSMENT — PAIN SCALES - GENERAL: PAINLEVEL_OUTOF10: MODERATE PAIN (6)
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
## 2025-03-11 NOTE — PATIENT INSTRUCTIONS
~Spine Center Scheduling #[phone].  ~Please call our [facility] Spine Nurse Navigation #[phone] with any questions or concerns about your treatment plan, if symptoms worsen and you would like to be seen urgently, or if you have problems controlling bladder and bowel function.  ~For any future flareups or new symptoms, recommend follow-up in clinic or contact the nurse navigator line.  ~Please note that any My Chart messages may take multiple days for a response due to the high volume of patients seen in clinic.  Anything sent Thursday night or after will be answered the following week when able, as Isabela Hernandez CNP does not work in clinic on Fridays.   ~Isabela Hernandez CNP is at the [facility] on Tuesdays, otherwise primarily at the [facility].       Prescribed Nabumatone today. Please take as prescribed, as needed for pain control as well as to aid in decreasing inflammation. Take medication with a full glass of water and food.   *Do not take Advil, Ibuprofen, Aleve, or Naproxen while taking this medication as it can cause organ failure if taken together*  This medication does have risks if taken long term, these risks include: gastrointestinal irritation, kidney dysfunction, and cardiovascular effects.       A muscle relaxer Flexeril was prescribed today to take as needed only, for muscle tightness and pain.  Please use with caution with driving due to possible sedation.       Imaging has been ordered. Radiology will call you to schedule. Please call below if you do not hear from them in the next couple of days.   [facility] Radiology Scheduling  Please call [phone] to schedule your image(s) (select option #1). There are 3 different locations near, see below.   There are imaging options for other locations as well, the imaging schedulers can help with other locations within [place].     [address]  [place] [id number] [facility], [address]   [facility] [address]    [place] [id number] [facility] [address]       Importance of specialized Physical Therapy: Discussed the importance of core strengthening, ROM, stretching exercises with the patient and how each of these entities is important in decreasing pain.  Explained to the patient that the purpose of physical therapy is to teach the patient a home exercise program individualized to them and their specific health concerns.  These exercises need to be performed every day in order to decrease pain and prevent future occurrences of pain.

## 2025-03-23 ENCOUNTER — HOSPITAL ENCOUNTER (OUTPATIENT)
Dept: MRI IMAGING | Facility: CLINIC | Age: 40
Discharge: HOME | End: 2025-03-23
Attending: NURSE PRACTITIONER | Admitting: NURSE PRACTITIONER
Payer: COMMERCIAL

## 2025-03-23 DIAGNOSIS — R20.0 NUMBNESS AND TINGLING IN RIGHT HAND: ICD-10-CM

## 2025-03-23 DIAGNOSIS — R20.2 NUMBNESS AND TINGLING IN RIGHT HAND: ICD-10-CM

## 2025-03-23 DIAGNOSIS — M54.12 RIGHT CERVICAL RADICULOPATHY: ICD-10-CM

## 2025-03-23 PROCEDURE — 72141 MRI NECK SPINE W/O DYE: CPT

## 2025-03-24 DIAGNOSIS — M48.02 CERVICAL STENOSIS OF SPINAL CANAL: ICD-10-CM

## 2025-03-24 DIAGNOSIS — M54.6 CHRONIC RIGHT-SIDED THORACIC BACK PAIN: ICD-10-CM

## 2025-03-24 DIAGNOSIS — M54.41 CHRONIC RIGHT-SIDED LOW BACK PAIN WITH RIGHT-SIDED SCIATICA: ICD-10-CM

## 2025-03-24 DIAGNOSIS — M48.02 FORAMINAL STENOSIS OF CERVICAL REGION: ICD-10-CM

## 2025-03-24 DIAGNOSIS — G89.29 CHRONIC RIGHT-SIDED LOW BACK PAIN WITH RIGHT-SIDED SCIATICA: ICD-10-CM

## 2025-03-24 DIAGNOSIS — R20.0 NUMBNESS OF RIGHT FOOT: ICD-10-CM

## 2025-03-24 DIAGNOSIS — M50.30 DDD (DEGENERATIVE DISC DISEASE), CERVICAL: ICD-10-CM

## 2025-03-24 DIAGNOSIS — R20.2 NUMBNESS AND TINGLING IN RIGHT HAND: ICD-10-CM

## 2025-03-24 DIAGNOSIS — G89.29 CHRONIC RIGHT-SIDED THORACIC BACK PAIN: ICD-10-CM

## 2025-03-24 DIAGNOSIS — R20.0 NUMBNESS AND TINGLING IN RIGHT HAND: ICD-10-CM

## 2025-03-24 DIAGNOSIS — M54.12 RIGHT CERVICAL RADICULOPATHY: Primary | ICD-10-CM

## 2025-04-10 ENCOUNTER — MYC MEDICAL ADVICE (OUTPATIENT)
Dept: PHYSICAL MEDICINE AND REHAB | Facility: CLINIC | Age: 40
End: 2025-04-10
Payer: COMMERCIAL

## 2025-04-10 DIAGNOSIS — M54.12 RIGHT CERVICAL RADICULOPATHY: Primary | ICD-10-CM

## 2025-04-10 RX ORDER — GABAPENTIN 300 MG/1
300 CAPSULE ORAL 3 TIMES DAILY
Qty: 90 CAPSULE | Refills: 3 | Status: SHIPPED | OUTPATIENT
Start: 2025-04-10

## 2025-05-13 ENCOUNTER — MYC MEDICAL ADVICE (OUTPATIENT)
Dept: PHYSICAL MEDICINE AND REHAB | Facility: CLINIC | Age: 40
End: 2025-05-13
Payer: COMMERCIAL

## 2025-05-13 DIAGNOSIS — M48.02 CERVICAL STENOSIS OF SPINAL CANAL: ICD-10-CM

## 2025-05-13 DIAGNOSIS — R20.0 NUMBNESS AND TINGLING IN RIGHT HAND: Primary | ICD-10-CM

## 2025-05-13 DIAGNOSIS — M54.12 RIGHT CERVICAL RADICULOPATHY: ICD-10-CM

## 2025-05-13 DIAGNOSIS — R20.2 NUMBNESS AND TINGLING IN RIGHT HAND: Primary | ICD-10-CM

## 2025-05-13 DIAGNOSIS — M48.02 FORAMINAL STENOSIS OF CERVICAL REGION: ICD-10-CM

## 2025-05-14 RX ORDER — GABAPENTIN 300 MG/1
600 CAPSULE ORAL 3 TIMES DAILY
Qty: 180 CAPSULE | Refills: 3 | Status: SHIPPED | OUTPATIENT
Start: 2025-05-14

## 2025-05-15 ENCOUNTER — TELEPHONE (OUTPATIENT)
Dept: PHYSICAL MEDICINE AND REHAB | Facility: CLINIC | Age: 40
End: 2025-05-15
Payer: COMMERCIAL

## 2025-05-15 NOTE — TELEPHONE ENCOUNTER
Procedure ordered? YES    What insurance are we billing for this procedure?  [facility]/[employer]  IF SCHEDULING AT [place] PAIN OR SPINE PLEASE SCHEDULE AT LEAST 7-10 BUSINESS DAYS OUT SO A PA CAN BE OBTAINED    Is a  PAIN  order available to link to injection appointment or does one need to be transcribed?  YES:   C7-T1 [place] TRISHA with [facility]      If needed, route to CN to assist in doing so.    Is  needed?: No   If YES, please initiate in-person  request.    Will patient have a ?  Yes    All fluoro procedures require a .  These US procedures also require a : piriformis, pudendal, scalene, & carpal tunnel.    Is patient taking any blood thinners (i.e. Plavix, coumadin, jantoven, warfarin, heparin, Xarelto, Pradaxa, Eliquis, Brilinta, or Effient, etc)? No   If YES, schedule out 2 weeks, and route to RN pool to determine if medication hold is needed.    Is patient taking aspirin? No   For CERVICAL or INTERLAMINAR procedures, route message to Care Navigation so they can seek approval from managing provider to hold aspirin for 6 days prior to the procedure.    Does patient have an allergy to contrast dye or iodine?  No  If YES, OK to schedule. Route to RN pool AND add allergy information to appointment notes    Is patient diabetic? No If YES, blood glucose level will be checked on day of procedure and will need to be 300mg/dL or below (for steroid injections).    Does patient have an active infection or treated for one within the past week? No   If YES, do NOT schedule and route to Care Navigation.     Is patient currently taking any antibiotics or have an active infection?  No  For patients on chronic, preventative, or prophylactic antibiotics, procedures may be scheduled.   For patients on antibiotics for active or recent infection: antibiotic course must have been completed and symptom-free of infection to safely proceed with injection.  Send to Care Navigation if  unsure.    Is patient actively being treated for cancer or immunocompromised? No  If YES, do NOT schedule and route to RN pool.     Any chance of pregnancy? NO   If YES, do NOT schedule and route to RN pool.    Have you had any vaccines in the last 2 weeks? NO  If YES, please route to Care Navigation to discuss before scheduling.     Reminders:  -  If you are started on any steroids or antibiotics between now and your appointment, you must contact us because it may affect our ability to perform your procedure.   reviewed    -  Informed patient that it is OK to take normal medications before the procedure and must hold blood thinners as instructed.  reviewed    -  Patients scheduled for MBBs should not take pain meds prior to injection and pain rating needs to be 5/10 or greater the day of the procedure.    -  Informed cervical injection patients that an IV will be placed as a precautionary measure.  reviewed    -  Patients should eat a light meal prior to their procedure appointment.  reviewed    -  All radiofrequency ablations are in a 40 minute time slot and not to be scheduled until in-basket message has been sent by the procedure team that the PA has been  received.  reviewed     -  Spinal cord stimulator trial scheduling is coordinated by the procedure team.    -  Care Navigation (#[phone]) is available to assist patients with additional questions.  reviewed    -  Cervical TFESIs with Dr. Mas only.    *PLEASE FORWARD TO CARE NAVIGATION IF INDICATED.  PATIENT SHOULD BE INFORMED THAT THEY WILL BE CONTACTED BY CARE NAVIGATION ONLY IF CHANGES TO MEDICATION/CARE PLAN RECOMMENDATIONS ARE NEEDED.  IF THEY DO NOT HEAR BACK FROM CARE NAVIGATION, THEY ARE FINE TO CONTINUE WITH THEIR CURRENT MEDICATIONS/CARE PLAN.*

## 2025-06-02 ENCOUNTER — MYC MEDICAL ADVICE (OUTPATIENT)
Dept: FAMILY MEDICINE | Facility: CLINIC | Age: 40
End: 2025-06-02
Payer: COMMERCIAL

## 2025-06-02 ENCOUNTER — MYC REFILL (OUTPATIENT)
Dept: FAMILY MEDICINE | Facility: CLINIC | Age: 40
End: 2025-06-02
Payer: COMMERCIAL

## 2025-06-02 DIAGNOSIS — F90.0 ATTENTION DEFICIT HYPERACTIVITY DISORDER (ADHD), PREDOMINANTLY INATTENTIVE TYPE: ICD-10-CM

## 2025-06-02 RX ORDER — DEXTROAMPHETAMINE SACCHARATE, AMPHETAMINE ASPARTATE, DEXTROAMPHETAMINE SULFATE AND AMPHETAMINE SULFATE 2.5; 2.5; 2.5; 2.5 MG/1; MG/1; MG/1; MG/1
10 TABLET ORAL DAILY
Qty: 30 TABLET | Refills: 0 | Status: SHIPPED | OUTPATIENT
Start: 2025-06-02

## 2025-06-03 ENCOUNTER — HOSPITAL ENCOUNTER (OUTPATIENT)
Dept: ULTRASOUND IMAGING | Facility: CLINIC | Age: 40
Discharge: HOME | End: 2025-06-03
Attending: UROLOGY
Payer: COMMERCIAL

## 2025-06-03 DIAGNOSIS — N20.1 URETEROLITHIASIS: ICD-10-CM

## 2025-06-03 PROCEDURE — 76770 US EXAM ABDO BACK WALL COMP: CPT

## 2025-06-05 ASSESSMENT — PATIENT HEALTH QUESTIONNAIRE - PHQ9
10. IF YOU CHECKED OFF ANY PROBLEMS, HOW DIFFICULT HAVE THESE PROBLEMS MADE IT FOR YOU TO DO YOUR WORK, TAKE CARE OF THINGS AT HOME, OR GET ALONG WITH OTHER PEOPLE: SOMEWHAT DIFFICULT
SUM OF ALL RESPONSES TO PHQ QUESTIONS 1-9: 8
SUM OF ALL RESPONSES TO PHQ QUESTIONS 1-9: 8

## 2025-06-06 ENCOUNTER — VIRTUAL VISIT (OUTPATIENT)
Dept: FAMILY MEDICINE | Facility: CLINIC | Age: 40
End: 2025-06-06
Payer: COMMERCIAL

## 2025-06-06 DIAGNOSIS — R63.8 UNABLE TO LOSE WEIGHT: Primary | ICD-10-CM

## 2025-06-06 PROCEDURE — 98005 SYNCH AUDIO-VIDEO EST LOW 20: CPT | Performed by: FAMILY MEDICINE

## 2025-06-06 NOTE — PROGRESS NOTES
Prior to immunization administration, verified patients identity using patient s name and date of birth. Please see Immunization Activity for additional information.     Screening Questionnaire for Adult Immunization    Are you sick today?   No   Do you have allergies to medications, food, a vaccine component or latex?   Yes   Have you ever had a serious reaction after receiving a vaccination?   No   Do you have a long-term health problem with heart, lung, kidney, or metabolic disease (e.g., diabetes), asthma, a blood disorder, no spleen, complement component deficiency, a cochlear implant, or a spinal fluid leak?  Are you on long-term aspirin therapy?   No   Do you have cancer, leukemia, HIV/AIDS, or any other immune system problem?   No   Do you have a parent, brother, or sister with an immune system problem?   No   In the past 3 months, have you taken medications that affect  your immune system, such as prednisone, other steroids, or anticancer drugs; drugs for the treatment of rheumatoid arthritis, Crohn s disease, or psoriasis; or have you had radiation treatments?   No   Have you had a seizure, or a brain or other nervous system problem?   No   During the past year, have you received a transfusion of blood or blood    products, or been given immune (gamma) globulin or antiviral drug?   No   For women: Are you pregnant or is there a chance you could become       pregnant during the next month?   No   Have you received any vaccinations in the past 4 weeks?   No     Immunization questionnaire was positive for at least one answer.  Notified provider.      Patient instructed to remain in clinic for 15 minutes afterwards, and to report any adverse reactions.     Screening performed by Minesh Lara MA on 6/6/2025 at 9:22 AM.       Answers submitted by the patient for this visit:  Patient Health Questionnaire (Submitted on 6/5/2025)  If you checked off any problems, how difficult have these problems made it for  you to do your work, take care of things at home, or get along with other people?: Somewhat difficult  PHQ9 TOTAL SCORE: 8  General Questionnaire (Submitted on 6/5/2025)  Chief Complaint: Chronic problems general questions HPI Form  What is the reason for your visit today? : Weight loss options  How many days per week do you miss taking your medication?: 3  What makes it hard for you to take your medication every day?: remembering to take  Questionnaire about: Chronic problems general questions HPI Form (Submitted on 6/5/2025)  Chief Complaint: Chronic problems general questions HPI Form

## 2025-06-06 NOTE — PROGRESS NOTES
"Alia is a 39 year old who is being evaluated via a billable video visit.    How would you like to obtain your AVS? MyChart  If the video visit is dropped, the invitation should be resent by: Text to cell phone: [phone]  Will anyone else be joining your video visit? No    1. Unable to lose weight (Primary)  This is a 38 yo female, frustrated by recent (and more remote) attempts at weight loss.  She has tried multiple methods of weight loss, increasing exercise, changing diet.  No improvement.  Will refer to weight management program.    - Adult Comprehensive Weight Management  Referral; Future      Subjective   Alia is a 39 year old, presenting for the following health issues:  Weight Loss (Discuss weight loss)        6/6/2025     9:21 AM   Additional Questions   Roomed by hsmaria de jesus   Accompanied by self     Video Start Time: 10:16    10:16 am  Struggling with weight loss - gaining instead of losing weight   Tried supplements -   Tried to do things to better immunity   2 months ago:  beef organ supplements to balance hormones  People tend to lose weight with it because they are \"where they should be\"  Has been doing more walking -   Is selling house, so busy with that   Cut out all sodas, most of the sugary coffee (every once in a while, needs a \"boost\").  Eats more salads   Eating , eating proteins  Had colonoscopy - didn't eat 4 days - didn't lose a pound   Now, 161# - last appointment     Has had problems with back as well   10:35    History of Present Illness       Reason for visit:  Weight loss options She is missing 3 dose(s) of medications per week.  She is not taking prescribed medications regularly due to remembering to take.          Review of Systems   Constitutional:  Positive for unexpected weight change (unable to lose weight despite attempts). Negative for chills and fever.   HENT:  Negative for ear pain and sore throat.    Eyes:  Negative for pain and visual disturbance. "   Respiratory:  Negative for cough and shortness of breath.    Cardiovascular:  Negative for chest pain and palpitations.   Gastrointestinal:  Negative for abdominal pain and vomiting.   Genitourinary:  Negative for dysuria and hematuria.   Musculoskeletal:  Negative for arthralgias and back pain.   Skin:  Negative for color change and rash.   Neurological:  Negative for seizures and syncope.   All other systems reviewed and are negative.          Objective    Vitals - Patient Reported  Systolic (Patient Reported):  (unable to)  Diastolic (Patient Reported):  (unable to)  Height (Patient Reported):  (unable to)  Temperature (Patient Reported):  (unable to)  Pain Score: No Pain (0)  Pain Loc: Other - see comment        Physical Exam       Results for orders placed or performed during the hospital encounter of 06/03/25   US Renal Complete Non-Vascular     Status: None    Narrative    EXAM: US RENAL COMPLETE NON-VASCULAR  LOCATION: [facility]  DATE: 6/3/2025    INDICATION:  Ureterolithiasis  COMPARISON: CT abdomen and pelvis 11/28/2024.  TECHNIQUE: Routine Bilateral Renal and Bladder Ultrasound.    FINDINGS:    RIGHT KIDNEY: 10.4 cm. Normal. No shadowing stones visualized. No hydronephrosis or mass.     LEFT KIDNEY: 10.3 cm. Normal. No shadowing stones visualized. No hydronephrosis or mass.     BLADDER: Normal.      Impression    IMPRESSION:  Normal kidneys and bladder.         Video-Visit Details    Type of service:  Video Visit   Video End Time:10:35  Originating Location (pt. Location): Home    Distant Location (provider location):  On-site  Platform used for Video Visit: Rachele  Signed Electronically by: MIKE FERNANDEZ MD

## 2025-06-09 ENCOUNTER — PATIENT OUTREACH (OUTPATIENT)
Dept: CARE COORDINATION | Facility: CLINIC | Age: 40
End: 2025-06-09

## 2025-06-11 ENCOUNTER — RADIOLOGY INJECTION OFFICE VISIT (OUTPATIENT)
Dept: PHYSICAL MEDICINE AND REHAB | Facility: CLINIC | Age: 40
End: 2025-06-11
Attending: NURSE PRACTITIONER
Payer: COMMERCIAL

## 2025-06-11 ENCOUNTER — PATIENT OUTREACH (OUTPATIENT)
Dept: CARE COORDINATION | Facility: CLINIC | Age: 40
End: 2025-06-11

## 2025-06-11 VITALS
DIASTOLIC BLOOD PRESSURE: 68 MMHG | BODY MASS INDEX: 29.44 KG/M2 | RESPIRATION RATE: 16 BRPM | OXYGEN SATURATION: 98 % | SYSTOLIC BLOOD PRESSURE: 126 MMHG | HEIGHT: 62 IN | HEART RATE: 80 BPM | WEIGHT: 160 LBS | TEMPERATURE: 98.3 F

## 2025-06-11 DIAGNOSIS — R20.0 NUMBNESS AND TINGLING IN RIGHT HAND: ICD-10-CM

## 2025-06-11 DIAGNOSIS — R20.2 NUMBNESS AND TINGLING IN RIGHT HAND: ICD-10-CM

## 2025-06-11 DIAGNOSIS — M54.12 RIGHT CERVICAL RADICULOPATHY: ICD-10-CM

## 2025-06-11 DIAGNOSIS — M48.02 FORAMINAL STENOSIS OF CERVICAL REGION: ICD-10-CM

## 2025-06-11 DIAGNOSIS — M48.02 CERVICAL STENOSIS OF SPINAL CANAL: ICD-10-CM

## 2025-06-11 RX ORDER — DEXAMETHASONE SODIUM PHOSPHATE 10 MG/ML
INJECTION, SOLUTION INTRAMUSCULAR; INTRAVENOUS
Status: COMPLETED | OUTPATIENT
Start: 2025-06-11 | End: 2025-06-11

## 2025-06-11 RX ORDER — LIDOCAINE HYDROCHLORIDE 10 MG/ML
INJECTION, SOLUTION EPIDURAL; INFILTRATION; INTRACAUDAL; PERINEURAL
Status: COMPLETED | OUTPATIENT
Start: 2025-06-11 | End: 2025-06-11

## 2025-06-11 RX ADMIN — LIDOCAINE HYDROCHLORIDE 2 ML: 10 INJECTION, SOLUTION EPIDURAL; INFILTRATION; INTRACAUDAL; PERINEURAL at 15:07

## 2025-06-11 RX ADMIN — DEXAMETHASONE SODIUM PHOSPHATE 10 MG: 10 INJECTION, SOLUTION INTRAMUSCULAR; INTRAVENOUS at 15:07

## 2025-06-11 ASSESSMENT — PAIN SCALES - GENERAL
PAINLEVEL_OUTOF10: MILD PAIN (2)
PAINLEVEL_OUTOF10: MILD PAIN (3)

## 2025-06-19 ENCOUNTER — PATIENT OUTREACH (OUTPATIENT)
Dept: CARE COORDINATION | Facility: CLINIC | Age: 40
End: 2025-06-19
Payer: COMMERCIAL

## 2025-06-22 ASSESSMENT — ENCOUNTER SYMPTOMS
SHORTNESS OF BREATH: 0
COLOR CHANGE: 0
HEMATURIA: 0
UNEXPECTED WEIGHT CHANGE: 1
BACK PAIN: 0
FEVER: 0
ABDOMINAL PAIN: 0
CHILLS: 0
VOMITING: 0
COUGH: 0
PALPITATIONS: 0
SORE THROAT: 0
EYE PAIN: 0
DYSURIA: 0
ARTHRALGIAS: 0
SEIZURES: 0

## 2025-07-03 ENCOUNTER — PATIENT OUTREACH (OUTPATIENT)
Dept: CARE COORDINATION | Facility: CLINIC | Age: 40
End: 2025-07-03
Payer: COMMERCIAL

## 2025-08-04 ENCOUNTER — MYC REFILL (OUTPATIENT)
Dept: FAMILY MEDICINE | Facility: CLINIC | Age: 40
End: 2025-08-04
Payer: COMMERCIAL

## 2025-08-04 DIAGNOSIS — F41.1 GAD (GENERALIZED ANXIETY DISORDER): ICD-10-CM

## 2025-08-04 RX ORDER — ESCITALOPRAM OXALATE 20 MG/1
20 TABLET ORAL DAILY
Qty: 90 TABLET | Refills: 1 | Status: SHIPPED | OUTPATIENT
Start: 2025-08-04

## 2025-08-04 RX ORDER — BUPROPION HYDROCHLORIDE 300 MG/1
300 TABLET ORAL DAILY
Qty: 90 TABLET | Refills: 0 | Status: SHIPPED | OUTPATIENT
Start: 2025-08-04

## 2025-08-04 RX ORDER — BUSPIRONE HYDROCHLORIDE 5 MG/1
5 TABLET ORAL 3 TIMES DAILY PRN
Qty: 60 TABLET | Refills: 1 | Status: SHIPPED | OUTPATIENT
Start: 2025-08-04

## 2025-08-31 ENCOUNTER — HEALTH MAINTENANCE LETTER (OUTPATIENT)
Age: 40
End: 2025-08-31

## (undated) DEVICE — TUBING IRRIG TUR Y TYPE 96" LF 6543-01

## (undated) DEVICE — SOL WATER IRRIG 1000ML BOTTLE 2F7114

## (undated) DEVICE — SOLUTION IRRIG 2B7127 .9NS 3000ML BAG

## (undated) DEVICE — A3 SUPPLIES- SEE NURSING INFO PAGE

## (undated) DEVICE — KIT ENDO FIRST STEP DISINFECTANT 200ML W/POUCH EP-4

## (undated) DEVICE — GUIDEWIRE SENSOR DUAL FLEX STR 0.035"X150CM M0066703080

## (undated) DEVICE — CUSTOM PACK CYSTO PREFERRED SOT5BCYHEA

## (undated) DEVICE — SUCTION MANIFOLD NEPTUNE 2 SYS 1 PORT 702-025-000

## (undated) DEVICE — MAT FLOOR WATERPROOF BACKSHEET FMBP30

## (undated) DEVICE — TUBING SUCTION MEDI-VAC 1/4"X20' N620A

## (undated) DEVICE — PREP DYNA-HEX 4% CHG SCRUB 4OZ BOTTLE MDS098710

## (undated) DEVICE — CATH URETERAL OPEN END 5FRX70CM M0064002010

## (undated) DEVICE — GOWN IMPERVIOUS BREATHABLE SMART XLG 89045

## (undated) DEVICE — CONNECTOR URETERAL CATH 140000

## (undated) RX ORDER — LIDOCAINE HYDROCHLORIDE 10 MG/ML
INJECTION, SOLUTION EPIDURAL; INFILTRATION; INTRACAUDAL; PERINEURAL
Status: DISPENSED
Start: 2024-11-28

## (undated) RX ORDER — FENTANYL CITRATE 50 UG/ML
INJECTION, SOLUTION INTRAMUSCULAR; INTRAVENOUS
Status: DISPENSED
Start: 2024-11-28

## (undated) RX ORDER — ONDANSETRON 2 MG/ML
INJECTION INTRAMUSCULAR; INTRAVENOUS
Status: DISPENSED
Start: 2024-11-28